# Patient Record
Sex: MALE | Race: WHITE | NOT HISPANIC OR LATINO | Employment: FULL TIME | URBAN - METROPOLITAN AREA
[De-identification: names, ages, dates, MRNs, and addresses within clinical notes are randomized per-mention and may not be internally consistent; named-entity substitution may affect disease eponyms.]

---

## 2020-01-08 ENCOUNTER — OFFICE VISIT - HEALTHEAST (OUTPATIENT)
Dept: FAMILY MEDICINE | Facility: CLINIC | Age: 40
End: 2020-01-08

## 2020-01-08 DIAGNOSIS — E66.01 MORBID OBESITY (H): ICD-10-CM

## 2020-01-08 DIAGNOSIS — I10 ESSENTIAL HYPERTENSION: ICD-10-CM

## 2020-01-08 DIAGNOSIS — E11.65 TYPE 2 DIABETES MELLITUS WITH HYPERGLYCEMIA, WITHOUT LONG-TERM CURRENT USE OF INSULIN (H): ICD-10-CM

## 2020-01-08 DIAGNOSIS — N49.2 SCROTAL ABSCESS: ICD-10-CM

## 2020-01-08 DIAGNOSIS — N49.2 CELLULITIS OF SCROTUM: ICD-10-CM

## 2020-01-08 ASSESSMENT — MIFFLIN-ST. JEOR: SCORE: 2566.68

## 2020-01-13 ENCOUNTER — OFFICE VISIT - HEALTHEAST (OUTPATIENT)
Dept: PHARMACY | Facility: CLINIC | Age: 40
End: 2020-01-13

## 2020-01-13 DIAGNOSIS — I10 ESSENTIAL HYPERTENSION: ICD-10-CM

## 2020-01-13 DIAGNOSIS — E11.65 TYPE 2 DIABETES MELLITUS WITH HYPERGLYCEMIA, WITHOUT LONG-TERM CURRENT USE OF INSULIN (H): ICD-10-CM

## 2020-01-13 DIAGNOSIS — N49.2 CELLULITIS OF SCROTUM: ICD-10-CM

## 2020-01-15 ENCOUNTER — RECORDS - HEALTHEAST (OUTPATIENT)
Dept: ADMINISTRATIVE | Facility: OTHER | Age: 40
End: 2020-01-15

## 2020-02-11 ENCOUNTER — COMMUNICATION - HEALTHEAST (OUTPATIENT)
Dept: FAMILY MEDICINE | Facility: CLINIC | Age: 40
End: 2020-02-11

## 2020-02-11 DIAGNOSIS — E11.65 TYPE 2 DIABETES MELLITUS WITH HYPERGLYCEMIA, WITHOUT LONG-TERM CURRENT USE OF INSULIN (H): ICD-10-CM

## 2020-02-18 ENCOUNTER — COMMUNICATION - HEALTHEAST (OUTPATIENT)
Dept: FAMILY MEDICINE | Facility: CLINIC | Age: 40
End: 2020-02-18

## 2020-02-18 DIAGNOSIS — E11.65 TYPE 2 DIABETES MELLITUS WITH HYPERGLYCEMIA, WITHOUT LONG-TERM CURRENT USE OF INSULIN (H): ICD-10-CM

## 2020-03-09 ENCOUNTER — OFFICE VISIT - HEALTHEAST (OUTPATIENT)
Dept: FAMILY MEDICINE | Facility: CLINIC | Age: 40
End: 2020-03-09

## 2020-03-09 DIAGNOSIS — H61.23 BILATERAL IMPACTED CERUMEN: ICD-10-CM

## 2020-03-09 DIAGNOSIS — H60.391 INFECTIVE OTITIS EXTERNA, RIGHT: ICD-10-CM

## 2020-03-16 ENCOUNTER — COMMUNICATION - HEALTHEAST (OUTPATIENT)
Dept: FAMILY MEDICINE | Facility: CLINIC | Age: 40
End: 2020-03-16

## 2020-03-16 DIAGNOSIS — E11.65 TYPE 2 DIABETES MELLITUS WITH HYPERGLYCEMIA, WITHOUT LONG-TERM CURRENT USE OF INSULIN (H): ICD-10-CM

## 2020-04-06 ENCOUNTER — AMBULATORY - HEALTHEAST (OUTPATIENT)
Dept: PHARMACY | Facility: CLINIC | Age: 40
End: 2020-04-06

## 2020-04-06 ENCOUNTER — COMMUNICATION - HEALTHEAST (OUTPATIENT)
Dept: PHARMACY | Facility: CLINIC | Age: 40
End: 2020-04-06

## 2020-04-06 DIAGNOSIS — H92.09 OTALGIA, UNSPECIFIED LATERALITY: ICD-10-CM

## 2020-04-06 DIAGNOSIS — N49.2 CELLULITIS OF SCROTUM: ICD-10-CM

## 2020-04-06 DIAGNOSIS — E11.65 TYPE 2 DIABETES MELLITUS WITH HYPERGLYCEMIA, WITHOUT LONG-TERM CURRENT USE OF INSULIN (H): ICD-10-CM

## 2020-04-10 ENCOUNTER — OFFICE VISIT - HEALTHEAST (OUTPATIENT)
Dept: FAMILY MEDICINE | Facility: CLINIC | Age: 40
End: 2020-04-10

## 2020-04-10 DIAGNOSIS — H61.23 BILATERAL IMPACTED CERUMEN: ICD-10-CM

## 2020-04-15 ENCOUNTER — COMMUNICATION - HEALTHEAST (OUTPATIENT)
Dept: SCHEDULING | Facility: CLINIC | Age: 40
End: 2020-04-15

## 2020-04-15 ENCOUNTER — OFFICE VISIT - HEALTHEAST (OUTPATIENT)
Dept: FAMILY MEDICINE | Facility: CLINIC | Age: 40
End: 2020-04-15

## 2020-04-15 DIAGNOSIS — L02.92 FURUNCLE OF SKIN OR SUBCUTANEOUS TISSUE: ICD-10-CM

## 2020-04-15 DIAGNOSIS — I10 ESSENTIAL HYPERTENSION: ICD-10-CM

## 2020-04-15 DIAGNOSIS — E66.01 MORBID OBESITY (H): ICD-10-CM

## 2020-04-15 DIAGNOSIS — E11.65 TYPE 2 DIABETES MELLITUS WITH HYPERGLYCEMIA, WITHOUT LONG-TERM CURRENT USE OF INSULIN (H): ICD-10-CM

## 2020-04-18 LAB — BACTERIA SPEC CULT: ABNORMAL

## 2020-05-04 ENCOUNTER — AMBULATORY - HEALTHEAST (OUTPATIENT)
Dept: PHARMACY | Facility: CLINIC | Age: 40
End: 2020-05-04

## 2020-05-04 DIAGNOSIS — N49.2 CELLULITIS OF SCROTUM: ICD-10-CM

## 2020-05-04 DIAGNOSIS — I10 ESSENTIAL HYPERTENSION: ICD-10-CM

## 2020-05-04 DIAGNOSIS — E11.65 TYPE 2 DIABETES MELLITUS WITH HYPERGLYCEMIA, WITHOUT LONG-TERM CURRENT USE OF INSULIN (H): ICD-10-CM

## 2020-05-04 DIAGNOSIS — H92.09 OTALGIA, UNSPECIFIED LATERALITY: ICD-10-CM

## 2020-06-25 ENCOUNTER — OFFICE VISIT - HEALTHEAST (OUTPATIENT)
Dept: FAMILY MEDICINE | Facility: CLINIC | Age: 40
End: 2020-06-25

## 2020-06-25 DIAGNOSIS — I10 ESSENTIAL HYPERTENSION: ICD-10-CM

## 2020-06-25 DIAGNOSIS — H60.391 INFECTIVE OTITIS EXTERNA, RIGHT: ICD-10-CM

## 2020-06-25 DIAGNOSIS — E11.65 TYPE 2 DIABETES MELLITUS WITH HYPERGLYCEMIA, WITHOUT LONG-TERM CURRENT USE OF INSULIN (H): ICD-10-CM

## 2020-07-02 ENCOUNTER — COMMUNICATION - HEALTHEAST (OUTPATIENT)
Dept: FAMILY MEDICINE | Facility: CLINIC | Age: 40
End: 2020-07-02

## 2020-07-02 DIAGNOSIS — E11.65 TYPE 2 DIABETES MELLITUS WITH HYPERGLYCEMIA, WITHOUT LONG-TERM CURRENT USE OF INSULIN (H): ICD-10-CM

## 2020-08-21 ENCOUNTER — COMMUNICATION - HEALTHEAST (OUTPATIENT)
Dept: FAMILY MEDICINE | Facility: CLINIC | Age: 40
End: 2020-08-21

## 2020-08-21 DIAGNOSIS — I10 ESSENTIAL HYPERTENSION: ICD-10-CM

## 2020-09-28 ENCOUNTER — COMMUNICATION - HEALTHEAST (OUTPATIENT)
Dept: FAMILY MEDICINE | Facility: CLINIC | Age: 40
End: 2020-09-28

## 2020-09-28 DIAGNOSIS — E11.65 TYPE 2 DIABETES MELLITUS WITH HYPERGLYCEMIA, WITHOUT LONG-TERM CURRENT USE OF INSULIN (H): ICD-10-CM

## 2020-10-02 ENCOUNTER — OFFICE VISIT (OUTPATIENT)
Dept: URGENT CARE | Facility: URGENT CARE | Age: 40
End: 2020-10-02
Payer: COMMERCIAL

## 2020-10-02 VITALS
WEIGHT: 315 LBS | OXYGEN SATURATION: 97 % | TEMPERATURE: 98.7 F | HEART RATE: 95 BPM | DIASTOLIC BLOOD PRESSURE: 100 MMHG | SYSTOLIC BLOOD PRESSURE: 170 MMHG

## 2020-10-02 DIAGNOSIS — L02.214 SOFT TISSUE ABSCESS OF INGUINAL REGION: Primary | ICD-10-CM

## 2020-10-02 DIAGNOSIS — I10 ESSENTIAL HYPERTENSION: ICD-10-CM

## 2020-10-02 PROCEDURE — 99203 OFFICE O/P NEW LOW 30 MIN: CPT | Performed by: FAMILY MEDICINE

## 2020-10-02 RX ORDER — CLINDAMYCIN HCL 300 MG
300 CAPSULE ORAL 3 TIMES DAILY
Qty: 30 CAPSULE | Refills: 0 | Status: SHIPPED | OUTPATIENT
Start: 2020-10-02 | End: 2020-10-12

## 2020-10-02 RX ORDER — INSULIN GLARGINE 100 [IU]/ML
INJECTION, SOLUTION SUBCUTANEOUS
COMMUNITY
Start: 2020-08-14 | End: 2021-10-01

## 2020-10-02 RX ORDER — AMLODIPINE BESYLATE 5 MG/1
TABLET ORAL
COMMUNITY
Start: 2019-10-11 | End: 2021-10-01

## 2020-10-02 RX ORDER — MUPIROCIN 20 MG/G
OINTMENT TOPICAL 2 TIMES DAILY
Qty: 22 G | Refills: 0 | Status: SHIPPED | OUTPATIENT
Start: 2020-10-02 | End: 2021-10-15

## 2020-10-02 RX ORDER — METOPROLOL TARTRATE 100 MG
TABLET ORAL
COMMUNITY
Start: 2019-10-11 | End: 2021-10-01

## 2020-10-02 RX ORDER — METFORMIN HCL 500 MG
TABLET, EXTENDED RELEASE 24 HR ORAL
COMMUNITY
Start: 2020-10-01 | End: 2021-10-01

## 2020-10-03 NOTE — PROGRESS NOTES
"  Patient presents with:  Urgent Care  Mass: Mass in Rt groin x4 days. Hx of same issue and same location which had him hospitalized due.       Subjective     Lm Leigh is a 39 year old male who presents to clinic today for the following health issues:    HPI   Genitourinary - Male  Onset/Duration: onset of pain 4 days ago, started as small bump  Increase in size, now red, now more near surface, history of bump in the same area in 12/2019  Patient evaluated diagnosed with an inguinal  Hernia,  Later there was an increase in size/pain, -area was not an inguinal hernia, infected ? Cyst in inguinal area had to be admitted to inpatient [place],area  drained it and IV antibiotic   small bump remained after completed antibiotics, now the area of previous infection-'small bump\" now increased in size, tender, inguinal area swelling again, no fever, discomfort with ambulation, when clothing rubs against the area  Description:   Dysuria (painful urination): no}  Hematuria (blood in urine): no  Frequency: no  Waking at night to urinate: no  Hesitancy (delay in urine): no  Retention (unable to empty): no  Decrease in urinary flow: no  Incontinence: no  Progression of Symptoms:  worsening  Accompanying Signs & Symptoms:  Fever: no  Back/Flank pain: no  Urethral discharge: no  Testicle lumps/masses/pain: no  Nausea and/or vomiting: no  Abdominal pain: no  History:   As noted   Precipitating or alleviating factors: None  Therapies tried and outcome: none, neosporin to areas when initially small bump increased in size       Patient Active Problem List   Diagnosis     Soft tissue abscess of inguinal region     No past surgical history on file.    Social History     Tobacco Use     Smoking status: Not on file   Substance Use Topics     Alcohol use: Not on file     No family history on file.      Current Outpatient Medications   Medication Sig Dispense Refill     amLODIPine (NORVASC) 5 MG tablet TAKE 1 TABLET BY MOUTH EVERY DAY  "      clindamycin (CLEOCIN) 300 MG capsule Take 1 capsule (300 mg) by mouth 3 times daily for 10 days 30 capsule 0     LANTUS SOLOSTAR 100 UNIT/ML soln INJECT 24 UNITS UNDER THE SKIN AT BEDTIME.       metFORMIN (GLUCOPHAGE-XR) 500 MG 24 hr tablet        metoprolol tartrate (LOPRESSOR) 100 MG tablet TAKE 1 TABLET BY MOUTH TWICE A DAY       mupirocin (BACTROBAN) 2 % external ointment Apply topically 2 times daily 22 g 0     Allergies   Allergen Reactions     Lisinopril Cough     No lab results found.   BP Readings from Last 3 Encounters:   10/02/20 (!) 170/100    Wt Readings from Last 3 Encounters:   10/02/20 (!) 176 kg (388 lb)                      Reviewed and updated as needed this visit by Provider                 Review of Systems   Constitutional, HEENT, cardiovascular, pulmonary, gi and gu systems are negative, except as otherwise noted.      Objective    BP (!) 170/100 (BP Location: Right arm, Patient Position: Chair, Cuff Size: Adult Large)   Pulse 95   Temp 98.7  F (37.1  C) (Temporal)   Wt (!) 176 kg (388 lb)   SpO2 97%   There is no height or weight on file to calculate BMI.       Physical Exam   GENERAL: healthy, alert and no distress  NECK: no adenopathy, no asymmetry, masses, or scars and thyroid normal to palpation  ABDOMEN: soft, nontender, no hepatosplenomegaly, no masses and bowel sounds normal   (male): normal male genitalia without lesions or urethral discharge, no hernia,swelling moderate tender, indurated, no fluctuance, faint erythema in soft tissue of right inguinal area,in central portion of indurated/soft tissue swelling is a small raised 1 cm in diameter area of scar tissue tray;/purple in color, site of previous abscess/I&D site, indurated, no fluctuance, no drainage or crusting tender, mild erythema   MS: no gross musculoskeletal defects noted, no edema  NEURO: Normal strength and tone, mentation intact and speech normal  BACK: no CVA tenderness, no paralumbar  tenderness      Diagnostic Test Results:  Labs reviewed in Epic  No results found for any visits on 10/02/20.        Assessment & Plan     Lm was seen today for urgent care and mass.    Diagnoses and all orders for this visit:    Soft tissue abscess of inguinal region  -     clindamycin (CLEOCIN) 300 MG capsule; Take 1 capsule (300 mg) by mouth 3 times daily for 10 days  -     mupirocin (BACTROBAN) 2 % external ointment; Apply topically 2 times daily    Essential hypertension  Comments:  no cp/sob/esparza, anxious today [facility], on metoprolol and norvasc, will contact pcp with recent elevated bp readings, home bp,may need med adjust      ASSESSMENT/PLAN:      ICD-10-CM    1. Soft tissue abscess of inguinal region  L02.214 clindamycin (CLEOCIN) 300 MG capsule     mupirocin (BACTROBAN) 2 % external ointment   2. Essential hypertension  I10     no cp/sob/esparza, anxious today [facility], on metoprolol and norvasc, will contact pcp with recent elevated bp readings, home bp,may need med adjust       Patient Instructions     If area increases in size, more painful, fever  to ER    Try warn compresses to area to help with swelling    Call urology and ask for an urgent appointment to have area checked if you do not go to ER,and even if area has improved (patient sees [facility])    Start clindamycin antibiotic 3 times a day for 10 days, ask pharmacist for a recommendation for a probiotic to take daily with antibiotic and continue daily for a total of 30 days                  Patient Education     Abscess (Antibiotic Treatment Only)  An abscess (sometimes called a  boil ) happens when bacteria get trapped under the skin and start to grow. Pus forms inside the abscess as the body responds to the bacteria. An abscess can happen with an insect bite, ingrown hair, blocked oil gland, pimple, cyst, or puncture wound.  In the early stages, your wound may be red and tender. For this stage, you  may get antibiotics. If the abscess does not get better with antibiotics, it will need to be drained with a small cut.  Home care  These tips will help you care for your abscess at home:    Soak the wound in hot water or apply hot packs (small towel soaked in hot water) to the area for 20 minutes at a time. Do this 3 to 4 times a day.    Do not cut, squeeze, or pop the boil yourself.    Apply antibiotic cream or ointment to the skin 3 to 4 times a day, unless something else was prescribed. Some ointments include an antibiotic plus a pain reliever.    If your doctor prescribed antibiotics, do not stop taking them until you have finished the medicine or the doctor tells you to stop.    You may use an over-the-counter pain medicine to control pain, unless another pain medicine was prescribed. If you have chronic liver or kidney disease or ever had a stomach ulcer or gastrointestinal bleeding, talk with your doctor before using these any of these.  Follow-up care  Follow up with your healthcare provider, or as advised. Check your wound each day for the signs of worsening infection listed below.  When to seek medical advice  Get prompt medical attention if any of these occur:    An increase in redness or swelling    Red streaks in the skin leading away from the abscess    An increase in local pain or swelling    Fever of 100.4 F (38 C) or higher, or as directed by your healthcare provider    Pus or fluid coming from the abscess    Boil returns after getting better  Date Last Reviewed: 9/1/2016 2000-2019 [employer]. [address] [place] [address], [place] [address]. All rights reserved. This information is not intended as a substitute for professional medical care. Always follow your healthcare professional's instructions.               Reviewed medication instructions and side effects. Follow up if experiences side effects.     I reviewed supportive care, otc meds to use if needed, expected course, and signs of  concern.  Follow up as directed  or if worsens in any way.  Reviewed red flag symptoms and is to go to the ER if experiences any of these.        Kristin Morales MD  [facility] [place]

## 2020-10-06 ENCOUNTER — RECORDS - HEALTHEAST (OUTPATIENT)
Dept: ADMINISTRATIVE | Facility: OTHER | Age: 40
End: 2020-10-06

## 2020-10-07 PROBLEM — L02.214 SOFT TISSUE ABSCESS OF INGUINAL REGION: Status: ACTIVE | Noted: 2020-10-07

## 2020-10-17 ENCOUNTER — COMMUNICATION - HEALTHEAST (OUTPATIENT)
Dept: FAMILY MEDICINE | Facility: CLINIC | Age: 40
End: 2020-10-17

## 2020-10-17 DIAGNOSIS — E11.65 TYPE 2 DIABETES MELLITUS WITH HYPERGLYCEMIA, WITHOUT LONG-TERM CURRENT USE OF INSULIN (H): ICD-10-CM

## 2020-11-08 ENCOUNTER — COMMUNICATION - HEALTHEAST (OUTPATIENT)
Dept: FAMILY MEDICINE | Facility: CLINIC | Age: 40
End: 2020-11-08

## 2020-11-08 DIAGNOSIS — I10 ESSENTIAL HYPERTENSION: ICD-10-CM

## 2020-12-04 ENCOUNTER — OFFICE VISIT - HEALTHEAST (OUTPATIENT)
Dept: FAMILY MEDICINE | Facility: CLINIC | Age: 40
End: 2020-12-04

## 2020-12-04 DIAGNOSIS — E11.9 TYPE 2 DIABETES MELLITUS WITHOUT COMPLICATION, WITH LONG-TERM CURRENT USE OF INSULIN (H): ICD-10-CM

## 2020-12-04 DIAGNOSIS — Z79.4 TYPE 2 DIABETES MELLITUS WITHOUT COMPLICATION, WITH LONG-TERM CURRENT USE OF INSULIN (H): ICD-10-CM

## 2020-12-04 DIAGNOSIS — L03.115 CELLULITIS OF RIGHT LOWER EXTREMITY: ICD-10-CM

## 2020-12-04 DIAGNOSIS — I10 BENIGN ESSENTIAL HYPERTENSION: ICD-10-CM

## 2020-12-09 ENCOUNTER — COMMUNICATION - HEALTHEAST (OUTPATIENT)
Dept: FAMILY MEDICINE | Facility: CLINIC | Age: 40
End: 2020-12-09

## 2020-12-09 DIAGNOSIS — E11.65 TYPE 2 DIABETES MELLITUS WITH HYPERGLYCEMIA, WITHOUT LONG-TERM CURRENT USE OF INSULIN (H): ICD-10-CM

## 2020-12-22 ENCOUNTER — AMBULATORY - HEALTHEAST (OUTPATIENT)
Dept: FAMILY MEDICINE | Facility: CLINIC | Age: 40
End: 2020-12-22

## 2020-12-22 DIAGNOSIS — E11.65 TYPE 2 DIABETES MELLITUS WITH HYPERGLYCEMIA, WITHOUT LONG-TERM CURRENT USE OF INSULIN (H): ICD-10-CM

## 2020-12-23 ENCOUNTER — OFFICE VISIT - HEALTHEAST (OUTPATIENT)
Dept: FAMILY MEDICINE | Facility: CLINIC | Age: 40
End: 2020-12-23

## 2020-12-23 DIAGNOSIS — E11.65 TYPE 2 DIABETES MELLITUS WITH HYPERGLYCEMIA, WITHOUT LONG-TERM CURRENT USE OF INSULIN (H): ICD-10-CM

## 2020-12-23 DIAGNOSIS — I10 ESSENTIAL HYPERTENSION: ICD-10-CM

## 2020-12-23 DIAGNOSIS — Z23 ENCOUNTER FOR IMMUNIZATION: ICD-10-CM

## 2020-12-23 DIAGNOSIS — Z11.59 NEED FOR HEPATITIS C SCREENING TEST: ICD-10-CM

## 2020-12-23 DIAGNOSIS — E66.01 MORBID OBESITY (H): ICD-10-CM

## 2020-12-23 LAB
ANION GAP SERPL CALCULATED.3IONS-SCNC: 15 MMOL/L (ref 5–18)
BUN SERPL-MCNC: 17 MG/DL (ref 8–22)
CALCIUM SERPL-MCNC: 9.4 MG/DL (ref 8.5–10.5)
CHLORIDE BLD-SCNC: 103 MMOL/L (ref 98–107)
CHOLEST SERPL-MCNC: 179 MG/DL
CO2 SERPL-SCNC: 18 MMOL/L (ref 22–31)
CREAT SERPL-MCNC: 1.01 MG/DL (ref 0.7–1.3)
CREAT UR-MCNC: 253.5 MG/DL
FASTING STATUS PATIENT QL REPORTED: YES
GFR SERPL CREATININE-BSD FRML MDRD: >60 ML/MIN/1.73M2
GLUCOSE BLD-MCNC: 171 MG/DL (ref 70–125)
HBA1C MFR BLD: 10.9 %
HDLC SERPL-MCNC: 51 MG/DL
LDLC SERPL CALC-MCNC: 108 MG/DL
MICROALBUMIN UR-MCNC: 72.68 MG/DL (ref 0–1.99)
MICROALBUMIN/CREAT UR: 286.7 MG/G
POTASSIUM BLD-SCNC: 4.2 MMOL/L (ref 3.5–5)
SODIUM SERPL-SCNC: 136 MMOL/L (ref 136–145)
TRIGL SERPL-MCNC: 100 MG/DL

## 2020-12-24 LAB — HCV AB SERPL QL IA: NEGATIVE

## 2020-12-28 ENCOUNTER — COMMUNICATION - HEALTHEAST (OUTPATIENT)
Dept: FAMILY MEDICINE | Facility: CLINIC | Age: 40
End: 2020-12-28

## 2021-01-19 ENCOUNTER — AMBULATORY - HEALTHEAST (OUTPATIENT)
Dept: EDUCATION SERVICES | Facility: CLINIC | Age: 41
End: 2021-01-19

## 2021-01-19 DIAGNOSIS — E11.65 TYPE 2 DIABETES MELLITUS WITH HYPERGLYCEMIA, WITHOUT LONG-TERM CURRENT USE OF INSULIN (H): ICD-10-CM

## 2021-01-21 ENCOUNTER — AMBULATORY - HEALTHEAST (OUTPATIENT)
Dept: LAB | Facility: CLINIC | Age: 41
End: 2021-01-21

## 2021-01-21 ENCOUNTER — OFFICE VISIT - HEALTHEAST (OUTPATIENT)
Dept: FAMILY MEDICINE | Facility: CLINIC | Age: 41
End: 2021-01-21

## 2021-01-21 ENCOUNTER — COMMUNICATION - HEALTHEAST (OUTPATIENT)
Dept: FAMILY MEDICINE | Facility: CLINIC | Age: 41
End: 2021-01-21

## 2021-01-21 ENCOUNTER — COMMUNICATION - HEALTHEAST (OUTPATIENT)
Dept: SCHEDULING | Facility: CLINIC | Age: 41
End: 2021-01-21

## 2021-01-21 DIAGNOSIS — E11.65 TYPE 2 DIABETES MELLITUS WITH HYPERGLYCEMIA, WITHOUT LONG-TERM CURRENT USE OF INSULIN (H): ICD-10-CM

## 2021-01-21 DIAGNOSIS — I10 ESSENTIAL HYPERTENSION: ICD-10-CM

## 2021-01-21 DIAGNOSIS — E66.01 MORBID OBESITY (H): ICD-10-CM

## 2021-01-21 DIAGNOSIS — R07.9 ACUTE CHEST PAIN: ICD-10-CM

## 2021-01-21 LAB
ATRIAL RATE - MUSE: 95 BPM
DIASTOLIC BLOOD PRESSURE - MUSE: NORMAL
FASTING STATUS PATIENT QL REPORTED: YES
GLUCOSE BLD-MCNC: 187 MG/DL (ref 70–125)
INTERPRETATION ECG - MUSE: NORMAL
P AXIS - MUSE: 52 DEGREES
PR INTERVAL - MUSE: 140 MS
QRS DURATION - MUSE: 100 MS
QT - MUSE: 362 MS
QTC - MUSE: 454 MS
R AXIS - MUSE: 72 DEGREES
SYSTOLIC BLOOD PRESSURE - MUSE: NORMAL
T AXIS - MUSE: 5 DEGREES
VENTRICULAR RATE- MUSE: 95 BPM

## 2021-01-22 LAB — C PEPTIDE SERPL-MCNC: 5.6 NG/ML (ref 0.9–6.9)

## 2021-01-24 LAB — GAD65 AB SER IA-ACNC: <5 IU/ML (ref 0–5)

## 2021-01-27 ENCOUNTER — COMMUNICATION - HEALTHEAST (OUTPATIENT)
Dept: FAMILY MEDICINE | Facility: CLINIC | Age: 41
End: 2021-01-27

## 2021-02-02 ENCOUNTER — HOSPITAL ENCOUNTER (OUTPATIENT)
Dept: CARDIOLOGY | Facility: HOSPITAL | Age: 41
Discharge: HOME | End: 2021-02-02
Attending: FAMILY MEDICINE

## 2021-02-02 ENCOUNTER — COMMUNICATION - HEALTHEAST (OUTPATIENT)
Dept: FAMILY MEDICINE | Facility: CLINIC | Age: 41
End: 2021-02-02

## 2021-02-02 DIAGNOSIS — R07.9 ACUTE CHEST PAIN: ICD-10-CM

## 2021-02-02 LAB
CV STRESS CURRENT BP HE: NORMAL
CV STRESS CURRENT HR HE: 109
CV STRESS CURRENT HR HE: 112
CV STRESS CURRENT HR HE: 113
CV STRESS CURRENT HR HE: 114
CV STRESS CURRENT HR HE: 117
CV STRESS CURRENT HR HE: 120
CV STRESS CURRENT HR HE: 121
CV STRESS CURRENT HR HE: 121
CV STRESS CURRENT HR HE: 126
CV STRESS CURRENT HR HE: 128
CV STRESS CURRENT HR HE: 129
CV STRESS CURRENT HR HE: 136
CV STRESS CURRENT HR HE: 140
CV STRESS CURRENT HR HE: 145
CV STRESS CURRENT HR HE: 146
CV STRESS CURRENT HR HE: 149
CV STRESS CURRENT HR HE: 151
CV STRESS CURRENT HR HE: 158
CV STRESS CURRENT HR HE: 161
CV STRESS CURRENT HR HE: 161
CV STRESS CURRENT HR HE: 165
CV STRESS CURRENT HR HE: 165
CV STRESS DEVIATION TIME HE: NORMAL
CV STRESS ECHO PERCENT HR HE: NORMAL
CV STRESS EXERCISE STAGE HE: NORMAL
CV STRESS EXERCISE STAGE REACHED HE: NORMAL
CV STRESS FINAL RESTING BP HE: NORMAL
CV STRESS FINAL RESTING HR HE: 112
CV STRESS MAX HR HE: 166
CV STRESS MAX TREADMILL GRADE HE: 14
CV STRESS MAX TREADMILL SPEED HE: 3.4
CV STRESS PEAK DIA BP HE: NORMAL
CV STRESS PEAK SYS BP HE: NORMAL
CV STRESS PHASE HE: NORMAL
CV STRESS PROTOCOL HE: NORMAL
CV STRESS RESTING PT POSITION HE: NORMAL
CV STRESS RESTING PT POSITION HE: NORMAL
CV STRESS ST DEVIATION AMOUNT HE: NORMAL
CV STRESS ST DEVIATION ELEVATION HE: NORMAL
CV STRESS ST EVELATION AMOUNT HE: NORMAL
CV STRESS TEST TYPE HE: NORMAL
CV STRESS TOTAL STAGE TIME MIN 1 HE: NORMAL
RATE PRESSURE PRODUCT: NORMAL
STRESS ECHO BASELINE DIASTOLIC HE: 88
STRESS ECHO BASELINE HR: 109
STRESS ECHO BASELINE SYSTOLIC BP: 194
STRESS ECHO CALCULATED PERCENT HR: 91 %
STRESS ECHO LAST STRESS DIASTOLIC BP: 94
STRESS ECHO LAST STRESS HR: 165
STRESS ECHO LAST STRESS SYSTOLIC BP: 208
STRESS ECHO POST ESTIMATED WORKLOAD: 7.9
STRESS ECHO POST EXERCISE DUR MIN: 6
STRESS ECHO POST EXERCISE DUR SEC: 38
STRESS ECHO TARGET HR: 165.62

## 2021-02-04 ENCOUNTER — AMBULATORY - HEALTHEAST (OUTPATIENT)
Dept: EDUCATION SERVICES | Facility: CLINIC | Age: 41
End: 2021-02-04

## 2021-02-04 DIAGNOSIS — E11.65 TYPE 2 DIABETES MELLITUS WITH HYPERGLYCEMIA, WITHOUT LONG-TERM CURRENT USE OF INSULIN (H): ICD-10-CM

## 2021-02-26 ENCOUNTER — OFFICE VISIT - HEALTHEAST (OUTPATIENT)
Dept: FAMILY MEDICINE | Facility: CLINIC | Age: 41
End: 2021-02-26

## 2021-02-26 DIAGNOSIS — E11.65 TYPE 2 DIABETES MELLITUS WITH HYPERGLYCEMIA, WITHOUT LONG-TERM CURRENT USE OF INSULIN (H): ICD-10-CM

## 2021-02-26 DIAGNOSIS — R07.9 ACUTE CHEST PAIN: ICD-10-CM

## 2021-02-26 DIAGNOSIS — R80.9 MICROALBUMINURIA: ICD-10-CM

## 2021-02-26 DIAGNOSIS — E66.813 CLASS 3 SEVERE OBESITY DUE TO EXCESS CALORIES WITH SERIOUS COMORBIDITY AND BODY MASS INDEX (BMI) OF 50.0 TO 59.9 IN ADULT (H): ICD-10-CM

## 2021-02-26 DIAGNOSIS — I10 ESSENTIAL HYPERTENSION: ICD-10-CM

## 2021-02-26 DIAGNOSIS — E66.01 CLASS 3 SEVERE OBESITY DUE TO EXCESS CALORIES WITH SERIOUS COMORBIDITY AND BODY MASS INDEX (BMI) OF 50.0 TO 59.9 IN ADULT (H): ICD-10-CM

## 2021-03-23 ENCOUNTER — AMBULATORY - HEALTHEAST (OUTPATIENT)
Dept: FAMILY MEDICINE | Facility: CLINIC | Age: 41
End: 2021-03-23

## 2021-03-23 ENCOUNTER — OFFICE VISIT - HEALTHEAST (OUTPATIENT)
Dept: FAMILY MEDICINE | Facility: CLINIC | Age: 41
End: 2021-03-23

## 2021-03-23 DIAGNOSIS — I10 ESSENTIAL HYPERTENSION: ICD-10-CM

## 2021-03-23 DIAGNOSIS — R07.9 ACUTE CHEST PAIN: ICD-10-CM

## 2021-03-23 DIAGNOSIS — E11.65 TYPE 2 DIABETES MELLITUS WITH HYPERGLYCEMIA, WITHOUT LONG-TERM CURRENT USE OF INSULIN (H): ICD-10-CM

## 2021-03-23 DIAGNOSIS — E66.01 CLASS 3 SEVERE OBESITY DUE TO EXCESS CALORIES WITH SERIOUS COMORBIDITY AND BODY MASS INDEX (BMI) OF 50.0 TO 59.9 IN ADULT (H): ICD-10-CM

## 2021-03-23 DIAGNOSIS — E66.813 CLASS 3 SEVERE OBESITY DUE TO EXCESS CALORIES WITH SERIOUS COMORBIDITY AND BODY MASS INDEX (BMI) OF 50.0 TO 59.9 IN ADULT (H): ICD-10-CM

## 2021-03-23 DIAGNOSIS — R80.9 MICROALBUMINURIA: ICD-10-CM

## 2021-03-23 LAB
ANION GAP SERPL CALCULATED.3IONS-SCNC: 11 MMOL/L (ref 5–18)
BUN SERPL-MCNC: 19 MG/DL (ref 8–22)
CALCIUM SERPL-MCNC: 8.8 MG/DL (ref 8.5–10.5)
CHLORIDE BLD-SCNC: 102 MMOL/L (ref 98–107)
CO2 SERPL-SCNC: 22 MMOL/L (ref 22–31)
CREAT SERPL-MCNC: 0.81 MG/DL (ref 0.7–1.3)
GFR SERPL CREATININE-BSD FRML MDRD: >60 ML/MIN/1.73M2
GLUCOSE BLD-MCNC: 160 MG/DL (ref 70–125)
HBA1C MFR BLD: 8.4 %
POTASSIUM BLD-SCNC: 4 MMOL/L (ref 3.5–5)
SODIUM SERPL-SCNC: 135 MMOL/L (ref 136–145)

## 2021-03-26 ENCOUNTER — AMBULATORY - HEALTHEAST (OUTPATIENT)
Dept: NURSING | Facility: CLINIC | Age: 41
End: 2021-03-26

## 2021-04-16 ENCOUNTER — AMBULATORY - HEALTHEAST (OUTPATIENT)
Dept: NURSING | Facility: CLINIC | Age: 41
End: 2021-04-16

## 2021-05-06 ENCOUNTER — COMMUNICATION - HEALTHEAST (OUTPATIENT)
Dept: FAMILY MEDICINE | Facility: CLINIC | Age: 41
End: 2021-05-06

## 2021-05-06 DIAGNOSIS — E11.65 TYPE 2 DIABETES MELLITUS WITH HYPERGLYCEMIA, WITHOUT LONG-TERM CURRENT USE OF INSULIN (H): ICD-10-CM

## 2021-05-24 ENCOUNTER — COMMUNICATION - HEALTHEAST (OUTPATIENT)
Dept: SCHEDULING | Facility: CLINIC | Age: 41
End: 2021-05-24

## 2021-05-24 DIAGNOSIS — E11.65 TYPE 2 DIABETES MELLITUS WITH HYPERGLYCEMIA, WITHOUT LONG-TERM CURRENT USE OF INSULIN (H): ICD-10-CM

## 2021-05-30 ENCOUNTER — RECORDS - HEALTHEAST (OUTPATIENT)
Dept: ADMINISTRATIVE | Facility: CLINIC | Age: 41
End: 2021-05-30

## 2021-06-04 VITALS
BODY MASS INDEX: 41.75 KG/M2 | HEART RATE: 88 BPM | DIASTOLIC BLOOD PRESSURE: 80 MMHG | SYSTOLIC BLOOD PRESSURE: 132 MMHG | WEIGHT: 315 LBS | HEIGHT: 73 IN

## 2021-06-04 VITALS
OXYGEN SATURATION: 97 % | HEART RATE: 95 BPM | WEIGHT: 315 LBS | BODY MASS INDEX: 50.03 KG/M2 | RESPIRATION RATE: 16 BRPM | DIASTOLIC BLOOD PRESSURE: 90 MMHG | TEMPERATURE: 98.1 F | SYSTOLIC BLOOD PRESSURE: 144 MMHG

## 2021-06-04 VITALS
DIASTOLIC BLOOD PRESSURE: 85 MMHG | TEMPERATURE: 97.9 F | OXYGEN SATURATION: 99 % | WEIGHT: 315 LBS | RESPIRATION RATE: 17 BRPM | HEART RATE: 80 BPM | SYSTOLIC BLOOD PRESSURE: 151 MMHG | BODY MASS INDEX: 48.53 KG/M2

## 2021-06-04 VITALS
OXYGEN SATURATION: 95 % | WEIGHT: 315 LBS | BODY MASS INDEX: 50.03 KG/M2 | SYSTOLIC BLOOD PRESSURE: 190 MMHG | DIASTOLIC BLOOD PRESSURE: 86 MMHG | HEART RATE: 92 BPM

## 2021-06-05 VITALS
OXYGEN SATURATION: 98 % | HEART RATE: 99 BPM | SYSTOLIC BLOOD PRESSURE: 150 MMHG | WEIGHT: 315 LBS | DIASTOLIC BLOOD PRESSURE: 80 MMHG | TEMPERATURE: 98.5 F | BODY MASS INDEX: 51.63 KG/M2

## 2021-06-05 VITALS
SYSTOLIC BLOOD PRESSURE: 148 MMHG | BODY MASS INDEX: 50.96 KG/M2 | TEMPERATURE: 97.3 F | DIASTOLIC BLOOD PRESSURE: 88 MMHG | WEIGHT: 315 LBS | HEART RATE: 88 BPM | OXYGEN SATURATION: 96 %

## 2021-06-05 VITALS
SYSTOLIC BLOOD PRESSURE: 146 MMHG | WEIGHT: 315 LBS | HEART RATE: 85 BPM | OXYGEN SATURATION: 97 % | TEMPERATURE: 98.7 F | DIASTOLIC BLOOD PRESSURE: 86 MMHG | BODY MASS INDEX: 50.96 KG/M2

## 2021-06-05 VITALS
HEART RATE: 85 BPM | BODY MASS INDEX: 51.23 KG/M2 | TEMPERATURE: 97.5 F | DIASTOLIC BLOOD PRESSURE: 86 MMHG | WEIGHT: 315 LBS | SYSTOLIC BLOOD PRESSURE: 152 MMHG | OXYGEN SATURATION: 85 %

## 2021-06-05 VITALS
SYSTOLIC BLOOD PRESSURE: 173 MMHG | RESPIRATION RATE: 20 BRPM | TEMPERATURE: 98.1 F | DIASTOLIC BLOOD PRESSURE: 96 MMHG | OXYGEN SATURATION: 97 % | HEART RATE: 100 BPM

## 2021-06-05 VITALS — WEIGHT: 315 LBS | BODY MASS INDEX: 49.89 KG/M2

## 2021-06-05 VITALS — WEIGHT: 315 LBS | BODY MASS INDEX: 51.1 KG/M2

## 2021-06-05 NOTE — PROGRESS NOTES
MTM Initial Encounter  Assessment & Plan                                                     Post Discharge Medication Reconciliation Status: discharge medications reconciled and changed, per note/orders (see AVS)     Type 2 Diabetes:  borderline controlled. A1C was not at goal of <7%, but has not been checked since starting insulin. FBG not at goal  mg/dL. Reviewed algorithm for diabetes medications and that I would recommend starting metformin. Since he may respond very well to metformin, will d/c Humalog when he starts metformin to avoid hypoglycemia. Reviewed hypoglycemia and diarrhea/loose stools. He will use more of his Humalog first before starting insulin since he paid ~$200 for his insulin. Since fasting BG are a bit high, will increase Lantus to 24 units but I hesitate increasing too much further in case he responds well to metformin. In the future can consider a GLP1 agonist.   Will check lipids in the future before considering statin, but he is not quite 42 years old. Too young for aspirin.   PLAN:   1. Increase Lantus to 24 units.   2. Start metformin  mg daily for 1 week, then increase to 1000 mg daily. Stop Humalog when starting metformin     Cellulitis of scrotum: Per patient, appears to have improved but planning on seeing urology on Wednesday. Encouraged him to return oxycodone to a drop box since he is not using.     Hypertension: BP well controlled. Will continue current medication regimen. If patient has signs of proteinuria when BG well controlled, will consider replacing amlodipine with losartan for renal protection.     Follow Up  1 month    Subjective & Objective                                                     Lm Leigh is a 39 y.o. male coming in for an initial visit for Medication Therapy Management. He was referred to me from Deep Goel MD for diabetes.     Chief Complaint: new diabetes diagnosis and wants to make sure he is doing everything right.      Medication Adherence/Access: Takes medications twice daily. No adherence issues noted.     Type 2 Diabetes: New diagnosis. Started on Lantus 22 units and Humalog 4 units three times a day + sliding scale inpatient and he continues this dose. Eats three meals a day now. Has AccuChek luciano on his phone to record.  Has three full Humalog pens left.   Tests BG 3-4 times daily before meals and HS. Blood sugars per log:   Fasting AM = 173, 182, 191,186,194, 176, 249, 294.  Before lunch   Last A1c checked 12/27/19 = 14.7%.   Hypoglycemia none  Hyperglycemia symptoms: was always drinking water but thinks he is drinking less now.   Using a AccuChek meter.   Microalbumin checked n/a  Is not taking a statin. Lipids checked in 2012.   Is not taking aspirin 81 mg for primary prevention.   Working to get the weight down. Not gone to the gym yet due to scrotum issue. Cut out soda and sugar.   Wt Readings from Last 3 Encounters:   01/08/20 (!) 352 lb 14.4 oz (160.1 kg)   12/29/19 (!) 358 lb 11.2 oz (162.7 kg)   12/23/16 (!) 350 lb (158.8 kg)     Cellulitis of scrotum: Patient was in [facility] for 3 days 12/26-12/29 for right groin swelling found to have scrotal abscess which was I&Ded at bedside on 12/27. ID had him on linezolid and Zosyn and discharged on 10 days Augmentin -- completed. Follow up with urology on Wednesday. Discharged home on APAP and oxycodone -- took the APAP PRN, but has not taken the oxycodone because he does not need it. Healing up fine.     Hypertension: Currently taking amlodipine 5 mg daily and metoprolol tartrate 100 mg two times a day. Patient does not monitor BP at home. Cough with lisinopril.   BP Readings from Last 3 Encounters:   01/08/20 132/80   12/29/19 137/79   12/23/16 165/77           PMH: reviewed in EPIC   Allergies/ADRs: reviewed in EPIC   Alcohol: reviewed in EPIC   Tobacco:   Social History     Tobacco Use   Smoking Status Never Smoker   Smokeless Tobacco Never Used     Today's Vitals:  "There were no vitals filed for this visit.  ----------------    The patient was given a summary of these recommendations as an after visit summary    I spent 30 minutes with this patient today.   All changes were made via collaborative practice agreement with Deep Goel MD. A copy of the visit note was provided to the patient's provider.     Milena AngelesD., BCACP  Medication Therapy Management Pharmacist  [facility]     Current Outpatient Medications   Medication Sig Dispense Refill     acetaminophen (TYLENOL) 500 MG tablet Take 2 tablets (1,000 mg total) by mouth every 6 (six) hours as needed.  0     amLODIPine (NORVASC) 5 MG tablet Take 5 mg by mouth daily.       blood glucose test strips Use 1 each As Directed as needed. Strips compatible with Accuchek guide 100 strip 11     generic lancets Use 1 each As Directed as needed. Dispense brand per patient's insurance at pharmacy discretion. 200 each 11     ibuprofen (ADVIL,MOTRIN) 400 MG tablet Take 400 mg by mouth every 6 (six) hours as needed for pain.       LANTUS SOLOSTAR U-100 INSULIN 100 unit/mL (3 mL) pen Inject 22 Units under the skin at bedtime. 5 adj dose pen PRN     metoprolol tartrate (LOPRESSOR) 100 MG tablet Take 100 mg by mouth 2 (two) times a day.       NOVOLOG FLEXPEN U-100 INSULIN 100 unit/mL (3 mL) injection pen 4unit SQ w/meals + slidingscale: 141-180: 2unit; 181-220: 4unit; 221-260: 6unit; 261-300: 8unit; 301-340: 10unit; 341-400: 12unit; >400:14u 5 Pre-filled Pen Syringe PRN     oxyCODONE (ROXICODONE) 5 MG immediate release tablet Take 1 tablet (5 mg total) by mouth every 6 (six) hours as needed for pain. 6 tablet 0     pen needle, diabetic 32 gauge x 5/32\" Ndle Use 1 each As Directed 4 (four) times a day as needed. 200 each 11     No current facility-administered medications for this visit.                "

## 2021-06-05 NOTE — PROGRESS NOTES
Hospital Follow-up Visit:    Assessment/Plan:     1. Type 2 diabetes mellitus with hyperglycemia, without long-term current use of insulin (H)  Patient will continue with 22 units of Lantus at night and 4 units of Humalog prandially 3 times daily depending on blood sugars patient has finished his Augmentin  - Ambulatory referral to Medication Management    2. Cellulitis of scrotum  Patient has finished his Augmentin; I will make an appointment with urology Dr. Guajardo for follow-up    3. Essential hypertension  Patient is to continue with amlodipine 5 mg daily    4. Morbid obesity (H)  We will enroll the patient in the weight loss program once his diabetes mellitus education and stability have been established    5. Scrotal abscess  Scrotal abscess was examined and is the size of presenting complaint but patient needs to be followed by urological surgery  - Ambulatory referral to Urology        Subjective:     Lm Leigh is a 39 y.o. male who presents for a hospital discharge follow up.  The patient was seen at the emergency room complaining of pain in his right groin.  He was seen and evaluated told he had an inguinal hernia and the referral was placed for general surgery to treat and evaluate.  However within the next 48 hours the patient noticed increased swelling tenderness fever discomfort in the right lower groin area.  Patient was re-seen and reevaluated appropriate imaging was done laboratory done patient had a deep cellulitis of the right groin.  He was admitted to the hospital placed on intravenous antibiotics and spent 2 overnights in the hospital.  He was evaluated by urology he was found to have a upper scrotal lower inguinal abscess this was I&D and with extravasation of puslike material.  The material was cultured.  Patient was discharged on amoxicillin clavulanic acid which was appropriate for the sensitivity of the apparent staph infection.  Patient was incidentally found to be a undiagnosed  diabetic with blood sugar greater than 300.  A1c of 14.7.  Patient was treated with insulin ultimately stabilized on 22 units of Lantus 4 units of prandial insulin instructed via diabetes education but told to present to primary care for further treatment evaluation reeducation in a primary care setting.  Patient wishes to change primary care providers based on recent history and illnesses.  Patient was seen today diabetes management reviewed.  Education medication management referral placed.  Patient has healing firm large abscess of right lower groin area.  Perusal of the medical record revealed that it was Dr. Guajardo of [facility], operated by [facility] urology who I&D of the abscess.  Patient was seen by infectious disease.  I feel that patient needs to be followed surgically to complete healing with this abscess and a referral was placed for urology.  I am satisfied with his diabetes management at this time.  His blood pressure management is adequate and will keep him on amlodipine 5 mg he will finish the amoxicillin clavulanic acid.  He does have morbid obesity will need weight management down the road.  That referral will be placed once he is surgical status has stabilized and his diabetes education and diabetes management is satisfactory to the new primary care setting.45 minute new patient and post hospitalization examination and review of medications and placement of appropriate referrals      Hospital/Nursing Home/IP Rehab Facility: [facility]  Date of Admission: 12/26/2019  Date of Discharge:12/29/2019  Reason(s) for Admission: Lump and swelling             Do you have any problems taking your medication regularly?  None       Have you had any changes in your medication since discharge? Yes, insulin       Have you had any difficulty following your discharge or treatment plan?  No    Summary of hospitalization:  Hospital discharge summary reviewed  Diagnostic Tests/Treatments reviewed.  Follow up needed: None  Other  "Healthcare Providers Involved in Patient's Care: Patient Care Team:  Mick Bonilla MD as PCP - General (Family Medicine)      Update since discharge: {improved   Information from family, SNF, care coordination:   Post Discharge Medication Reconciliation: discharge medications reconciled, continue medications without change  Plan of care communicated with: patient    Objective:     Vitals:    01/08/20 0808   BP: 132/80   Pulse: 88   Weight: (!) 352 lb 14.4 oz (160.1 kg)   Height: 6' 0.5\" (1.842 m)         Physical Exam:  General Appearance:  Alert, cooperative, no distress; overweight morbid body habitus  Head:  Normocephalic, no obvious abnormality  Ears: TM anatomy normal  Eyes:  PERRL, EOM's intact, conjunctiva and corneas clear  Nose:  Nares symmetrical, septum midline, mucosa pink, no sinus tenderness  Throat:  Lips, tongue, and mucosa are moist, pink, and intact  Neck:  Supple, symmetrical, trachea midline, no adenopathy; thyroid: no enlargement, symmetric,no tenderness/mass/nodules; no carotid bruit, no JVD  Back:  Symmetrical, no curvature, ROM normal, no CVA tenderness  Chest/Breast:  No mass or tenderness  Lungs:  Clear to auscultation bilaterally, respirations unlabored   Heart:  Normal PMI, regular rate & rhythm, S1 and S2 normal, no murmurs, rubs, or gallops  Abdomen:  Soft, non-tender, bowel sounds active all four quadrants, no mass, or organomegaly  Musculoskeletal:  Tone and strength strong and symmetrical, all extremities  Lymphatic:  No adenopathy  Skin/Hair/Nails:  Skin warm, dry, and intact, no rashes  Neurologic:  Alert and oriented x3, no cranial nerve deficits, normal strength and tone, gait steady  Extremities:  No edema.  Suhas's sign negative.    Genitourinary: In the lower right inguinal area in the right upper scrotum there is a mass measuring 3-1/2 x 5-1/2 cm which is mobile and nontender at this time appearance is that of a healing fibrous-like abscess I do not palpate any inguinal " adenopathy.  Follow-up for inguinal hernia will be indicated  Pulses:  Equal bilaterally      Coding guidelines for this visit:  Type of Medical   Decision Making Face-to-Face Visit       within 7 Days of discharge Face-to-Face Visit        within 14 days of discharge   Moderate Complexity [id number]   High Complexity [id number]       Electronically signed by Deep Goel MD 01/08/20 8:11 AM

## 2021-06-05 NOTE — PATIENT INSTRUCTIONS - HE
Recommendations from today's PharmD medication management visit                                                       1. When are you ready to be done with Humalog, start metformin  mg once daily. After one week increase to 1000 mg (two tablets).     2. Increase Lantus to 24 units    Next pharmacist visit: about 1 month on MyChart or phone    It was great to speak with you today.  I value your experience and would be very thankful for your time with providing feedback on our clinic survey. You may receive a survey via email or text message in the next few days.     My Pharmacist's contact information:                                                       It was a pleasure seeing you today to discuss your medications!  Please feel free to contact me with any questions or concerns you have. I look forward to seeing you again to help you get the most benefit from your medications!    To reschedule your PharmD appointment, please call the clinic directly or you may call the [facility] scheduling line at [phone] or toll-free at [phone]:   [facility] (available for appointments Mondays and Thursdays)  [facility] (available for appointments Tuesday, Wednesday & Friday)     Rajni Hansen, Gary, BCACP  Medication Therapy Management Pharmacist  [facility]

## 2021-06-06 NOTE — TELEPHONE ENCOUNTER
Medication Request  Medication name: Metformin 500 mg 24 hr tablet. Takes 2 tablets daily per patient.  Requested Pharmacy: [facility]  Reason for request: is out of medication      When did you use medication last?:  Today      Okay to leave a detailed message: yes

## 2021-06-06 NOTE — TELEPHONE ENCOUNTER
"Who is calling:  Patient  Reason for Call:  Patient was checking the status of his request. Patient was informed it was approved but was not sent as an Rx. See Lantus Rx, it was approved as a \"no print\". Please have a covering re-order this correctly as a normal Rx.    Please call the patient when this Rx has been fixed and approved.  Date of last appointment with primary care: 1/8/20  Okay to leave a detailed message: No  [phone]    "

## 2021-06-06 NOTE — PROGRESS NOTES
Ear Cerumen Removal    Date/Time: 3/9/2020 7:26 PM  Performed by: Juliet Gómez CNP  Authorized by: Juliet Gómez, CNP     Anesthesia:  Local Anesthetic: none  Location details: right ear and left ear  Patient tolerance: Patient tolerated the procedure well with no immediate complications  Procedure type: irrigation   Sedation:  Patient sedated: no

## 2021-06-06 NOTE — TELEPHONE ENCOUNTER
RN cannot approve Refill Request    RN can NOT refill this medication Protocol failed and NO refill given.       Freya Fletcher, [facility] Triage/Med Refill 2/20/2020    Requested Prescriptions   Pending Prescriptions Disp Refills     LANTUS SOLOSTAR U-100 INSULIN 100 unit/mL (3 mL) pen 15 adj dose pen 11     Sig: Inject 24 Units under the skin at bedtime.       Insulin/GLP-1 Refill Protocol Failed - 2/18/2020 12:44 PM        Failed - Microalbumin in last year     No results found for: MICROALBUR               Passed - Visit with PCP or prescribing provider visit in last 6 months     Last office visit with prescriber/PCP: 1/8/2020 OR same dept: 1/8/2020 Deep Goel MD OR same specialty: 1/8/2020 Deep Goel MD Last physical: Visit date not found Last MTM visit: Visit date not found     Next appt within 3 mo: Visit date not found  Next physical within 3 mo: Visit date not found  Prescriber OR PCP: Deep Goel MD  Last diagnosis associated with med order: 1. Type 2 diabetes mellitus with hyperglycemia, without long-term current use of insulin (H)  - LANTUS SOLOSTAR U-100 INSULIN 100 unit/mL (3 mL) pen; Inject 24 Units under the skin at bedtime.  Dispense: 15 adj dose pen; Refill: 11    If protocol passes may refill for 6 months if within 3 months of last provider visit (or a total of 9 months).              Passed - A1C in last 6 months     Hemoglobin A1c   Date Value Ref Range Status   12/27/2019 14.7 (H) 4.2 - 6.1 % Final               Passed - Blood pressure in last year     BP Readings from Last 1 Encounters:   01/08/20 132/80             Passed - Creatinine done in last year     Creatinine   Date Value Ref Range Status   12/28/2019 0.75 0.70 - 1.30 mg/dL Final

## 2021-06-06 NOTE — TELEPHONE ENCOUNTER
Who is calling:  Patient   Reason for Call:  Caller is checking on the status of refill. Caller stated that he is going to be out soon and is wondering what the processes are. Caller stated that hopefully it can get filled soon.   Date of last appointment with primary care:   Okay to leave a detailed message: Yes  [phone]

## 2021-06-06 NOTE — TELEPHONE ENCOUNTER
Who is calling:  Patient  Reason for Call:  Patient has questions regarding metformin dosage and would like to speak with Rajni Stuart in Med management  Date of last appointment with primary care: 1/8/20  Okay to leave a detailed message: Yes

## 2021-06-06 NOTE — TELEPHONE ENCOUNTER
Medication Question or Clarification  Who is calling: patient  What medication are you calling about (include dose and sig)?:     blood glucose test strips  100 strip  11  12/29/2019      Sig - Route: Use 1 each As Directed as needed. Strips compatible with Accuchek guide - Miscellaneous       Who prescribed the medication?: Libertad Walker MD   What is your question/concern?: Patient asking if he is to be testing 1 time daily or 3 times daily. Patient reports he needs a new prescription. Please advise!  Requested Pharmacy: [facility] [address]  Okay to leave a detailed message?: Yes

## 2021-06-06 NOTE — TELEPHONE ENCOUNTER
Called Lm. Calld because he is running low on metformin and would like a refill. Taking  mg x2 in the morning. No loose stools. BG have been 140-150 most of the time. No lows.     Will refill for 90 day supply and set up follow up for beginning of April for repeat labs.     Rajni Hansen, Pharm.D., BCACP  Medication Therapy Management Pharmacist  [facility]

## 2021-06-07 NOTE — TELEPHONE ENCOUNTER
Patient Returning Call  Reason for call:  Patient returning miss call from clinic  Information relayed to patient:  Writer relayed encounter message as written and assisted patient in scheduling telephone appointment for 04/10/20  Patient has additional questions:  No   If YES, what are your questions/concerns:    Okay to leave a detailed message?: No call back needed

## 2021-06-07 NOTE — TELEPHONE ENCOUNTER
Rajni how often do you think this patient should check his blood sugar?   Looks like you have seen him previously and discussed diabetes.    see mdm for attending note

## 2021-06-07 NOTE — TELEPHONE ENCOUNTER
Reason contacted:  Appointment   Information relayed:  Helped arrange an in clinic office visit today as it appears patients telephone visit was arranged for tomorrow rather than today. Patient is going to speak with his employer to ensure he will be able to leave work for this appointment and call me back.   Additional questions:  No  Further follow-up needed:  No  Okay to leave a detailed message:  No

## 2021-06-07 NOTE — TELEPHONE ENCOUNTER
Spoke to patient this morning for diabetes follow up.   He was seen at walk in [facility] on 3/9/20 for ear pain and had cerumen removal and prescribed acetic acid-hydrocortisone solution. Continues to have the ear pain. Does not think he needs more wax removal. [facility] was a 3 hour wait and he would like to have follow up by PCP or covering provider -- he is ok with phone appointment if able.     Works from 9am-5pm, so would prefer early appointment if possible. Please reach out to patient to schedule. Thanks!     Rajni Hansen, Pharm.D., BCACP  Medication Therapy Management Pharmacist  [facility]

## 2021-06-07 NOTE — PROGRESS NOTES
MT Follow Up Encounter  Assessment & Plan                                                       Type 2 Diabetes:  improved. A1C was not goal of <7% and has not been rechecked since diagnosis. Will not be able to check until COVID-19 resolution, but based on BG his A1c will be improved. FBG not at goal  mg/dL, but two hours post prandial at goal <180 mg/dL. Rather than increase insulin, will start with maximizing metformin. Will have him take 1500 mg for 1 week then 2000 mg daily to lessen GI risk. Will follow up in 1 month and if fasting AM BG still >150 mg/dL, then will increase Lantus. Could consider GLP1 agonist in the future.   Provided him with link for Lantus coupon.   Reviewed to monitor for hypoglycemia.   Will check microalbuminuria and lipids in the future with A1c.   Too young for aspirin. Will defer statin until lipids checked.   PLAN:   1. Incerase metformin to 1500 mg/day for 1 week, then 2000 mg/day.   2. Future A1c, lipids, and microalbuminuria.     Cellulitis of scrotum: Resolved.      Ear Pain: Patient continues to be in pain. Will send message to PCP to see if they can set up a phone appointment.     Follow Up  1 month     Subjective & Objective                                                       Lm Leigh is a 39 y.o. male called for a follow up visit for Medication Therapy Management. He was referred to me from Deep Goel MD.     Patient consented to a telehealth visit: Yes    Chief Complaint: Diabetes follow up since 1/13/20 appointment    Medication Adherence/Access: No issues noted.     Type 2 Diabetes: New diagnosis at last [facility] appt. Was started on Lantus 22 units and Humalog 4 units three times a day + sliding scale inpatient. At last MT appt, we increased Lantus to 24 units and started metformin  mg x2 (1000 mg) daily.  No diarrhea, loose stools. Would rather take pills than insulin, especially due to cost ~$100. Has 2 pens left  Has AccuChek luciano on his phone to  record.   Blood sugars over the weekend:   Fasting am = 198, 189  ~4pm (over 2 hours after lunch) = 173, 134  ~9pm (over 2 hours after dinner) = 153, 142  He notices that he tends to run higher in the morning fasting  Last A1c checked 12/27/19 = 14.7%.   Hypoglycemia none  Hyperglycemia symptoms: none. Was feeling good until last few weeks which he attributes to his ear infection.   Using a AccuChek meter.   Microalbumin checked n/a  Is not taking a statin. Lipids checked in 2012.   Is not taking aspirin 81 mg for primary prevention.   Wt Readings from Last 3 Encounters:   03/09/20 (!) 362 lb 12.8 oz (164.6 kg)   01/08/20 (!) 352 lb 14.4 oz (160.1 kg)   12/29/19 (!) 358 lb 11.2 oz (162.7 kg)     Cellulitis of scrotum: Patient was in [facility] for 3 days 12/26-12/29 for right groin swelling found to have scrotal abscess which was I&Ded at bedside on 12/27. Reports resolved.      Ear Pain: Seen at [facility] on 3/9/20. Had cerumen removal and given acetic acid-hydrocortisone to use in ear. Reports he does not need the wax cleaned more, but even with the drops he is still having ear pain. Would like an antibiotics. He is putting cotton ball and soaking, but the medicine is soaking through.     PMH: reviewed in EPIC   Allergies/ADRs: reviewed in EPIC   Alcohol: reviewed in EPIC  Tobacco:   Social History     Tobacco Use   Smoking Status Never Smoker   Smokeless Tobacco Never Used     Today's Vitals: There were no vitals filed for this visit.  ----------------    The patient declined an after visit summary    I spent 15 minutes with this patient today;   All changes were made via collaborative practice agreement with Deep Goel MD. A copy of the visit note was provided to the patient's provider.     Rajni Hansen, Pharm.D., [facility]  Medication Therapy Management Pharmacist  [facility]     Current Outpatient Medications   Medication Sig Dispense Refill     acetaminophen (TYLENOL) 500 MG tablet Take 2  "tablets (1,000 mg total) by mouth every 6 (six) hours as needed.  0     acetic acid-hydrocortisone (VOSOL-HC) otic solution Administer 5 drops to the right ear 3 (three) times a day. 10 mL 0     amLODIPine (NORVASC) 5 MG tablet Take 5 mg by mouth daily.       blood glucose test strips Use to check blood sugars three times daily. Strips compatible with Dry Lube guide. Dispense brand per patient's insurance at pharmacy discretion. 300 strip 11     generic lancets Use to check blood sugars three times daily. Dispense brand per patient's insurance at pharmacy discretion. 300 each 11     LANTUS SOLOSTAR U-100 INSULIN 100 unit/mL (3 mL) pen Inject 24 Units under the skin at bedtime. 15 adj dose pen 0     metFORMIN (GLUCOPHAGE-XR) 500 MG 24 hr tablet Take 2 tablets (1,000 mg total) by mouth daily for 7 days. 180 tablet 0     metoprolol tartrate (LOPRESSOR) 100 MG tablet Take 100 mg by mouth 2 (two) times a day.       pen needle, diabetic 32 gauge x 5/32\" Ndle Use 1 each As Directed 4 (four) times a day as needed. 200 each 11     No current facility-administered medications for this visit.                              "

## 2021-06-07 NOTE — TELEPHONE ENCOUNTER
He is on insulin, therefore I would like him to check three times daily. I will send a new prescription now.

## 2021-06-07 NOTE — TELEPHONE ENCOUNTER
New Appointment Needed  What is the reason for the visit:    Same Date/Next Day Appt Request  What is the reason for your visit?: Been waiting on a phone visit- patient is now at work and still waiting on the call; he ask us to call him at least 3 times so he can step out and take the call.    Provider Preference: Dr. Bradley  How soon do you need to be seen?: ASAP  Waitlist offered?: No  Okay to leave a detailed message:  No

## 2021-06-07 NOTE — PROGRESS NOTES
Incision and drainage    Date/Time: 4/15/2020 3:31 PM  Performed by: James Aguilar MD  Authorized by: James Aguilar MD       Universal Protocol    Site marked: Yes    Prior images obtained and reviewed: Yes    Required items: required blood products, implants, devices, and special equipment available    Patient identity confirmed: verbally with patient    Reevaluation: Patient was reevaluated immediately before administering moderate or deep sedation or anesthesia    Confirmation checklist: patient's identity using two indicators    Time out: Immediately prior to procedure a time out was called to verify the correct patient, procedure, equipment, support staff and site/side marked as required    Universal Protocol: Joint Commission Universal Protocol was followed    Preparation: Patient was prepped and draped in the usual sterile fashion    Indications/Location  Type: abscess  Body area: trunk  Location: abdomen    Anesthesia    Local anesthesia used?: Yes    Anesthesia: local infiltration    Local anesthetic: lidocaine 1% with epinephrine    Anesthetic total (mL): 1    Sedation  Patient sedation: No    Procedure Details  Scalpel size: 11  Incision depth: dermal  Drainage: purulent  Drainage amount: scant  Wound treatment: wound left open  Packing material: none      Post-procedure   Length of time physician present for 1:1 monitoring during sedation: 0

## 2021-06-07 NOTE — PROGRESS NOTES
Lm Leigh is a 39 y.o. male who is being evaluated via a billable telephone visit.        HPI: The patient is seen today via virtual phone visit regarding persistent left ear pain. He was seen in [facility] and had bilateral ear irrigation. It isn't clear to me from the note whether they were able to remove all the wax at the time of irrigation. He was prescribed topical drops for otitis externa which has has used intermittently but he has persistent pain and plugging in the left ear. He is otherwise feeling well.     I have reviewed and updated the patient's Past Medical History, Social History, Family History and Medication List.    ALLERGIES  Shellfish containing products and Lisinopril      Assessment/Plan:  Problem List Items Addressed This Visit     None      Visit Diagnoses     Bilateral impacted cerumen    -  Primary      Given persistent symptoms, I am recommending patient be seen. Clinic appointment offered but he doesn't think he can make it in during clinic hours. [facility] will [facility].      Phone call duration:  4 minutes    Clare Beach MD

## 2021-06-07 NOTE — TELEPHONE ENCOUNTER
Pimple tender to the touch    It seems like it may be a boil.  But it has gotten bigger,and has a red, dark middle area,  Put neosporin on it after washing it last night.  It is getting worse.   He is afraid of infection.    Telephone appointment made with patient for today.  Kayli Hubbard RN  [facility] Triage/refill nurse              Reason for Disposition    Patient wants to be seen    Wound becomes more painful or swollen, 3 or more days after injury    Black (necrotic) color or blisters develop in wound    [1] Spreading redness around the boil AND [2] no fever    [1] Boil AND [2] diabetes mellitus or weak immune system (e.g., HIV positive, cancer chemotherapy, transplant patient)    Protocols used: WOUND INFECTION-A-OH, BOIL (SKIN ABSCESS)-A-AH

## 2021-06-07 NOTE — PROGRESS NOTES
Assessment/Plan:    1. Furuncle of skin or subcutaneous tissue  Furuncle noted pubic region.  I&D performed.  See procedure note.  Bactrim double strength prescribed twice daily x10 days.  Wound culture pending.  - sulfamethoxazole-trimethoprim (BACTRIM DS) 800-160 mg per tablet; Take 1 tablet by mouth 2 (two) times a day for 10 days.  Dispense: 20 tablet; Refill: 0  - Culture, Wound    2. Type 2 diabetes mellitus with hyperglycemia, without long-term current use of insulin (H)  Type 2 diabetes suboptimal control with prior A1c of 14.7% December 27, 2019.  Recently increase metformin extended release 500 mg from 2 tablets in the morning instead to 2 tablets twice daily beginning yesterday.  Has follow-up by phone with Rajni SMITH pharmacist May 4, 2020.  Continues Lantus 24 units daily.  Wants to wait on lab follow-up at this time and would reassess in perhaps July 2020 at follow-up office visit.    3. Essential hypertension  Blood pressure suboptimal control blood pressure 154/88 on recheck otherwise continues amlodipine 5 mg daily and metoprolol tartrate 100 mg twice daily.  Declines dose adjustment at this time.  Therapeutic lifestyle changes reviewed for further improvement.    4. Morbid obesity (H)  Dietary and exercise modifications reviewed for weight goal less than 300 pounds initially.      The following high BMI interventions were performed this visit: encouragement to exercise, weight monitoring, weight loss from baseline weight and lifestyle education regarding diet .  Ensure ongoing efforts to achieve weight goal < 300 pounds ideally.         Subjective:    Lm Leigh is seen today for skin abscess.  Lower groin midline.  Is noticed this over past several weeks.  Getting more painful and larger.  Had concern previously with described scrotal abscess.  Was drained by urologist following admission to hospital.  This seems different.  Does have type 2 diabetes poorly controlled with A1c of 14.7%  "historically.  Recently doubled his dose of metformin extended release from 1000 g up to 2000 mg total dose daily plus Lantus continuing 24 units daily.  Working with Rajni SMITH pharmacist dose adjustment tolerates amlodipine 5 mg daily metoprolol tartrate 100 mg twice daily.  No fevers or chills.  No urinary symptoms.  Comprehensive review of systems as above otherwise all negative.    Single  No children  Tobacco:  none  EtOH:  never (by choice) - grandparents with EtOH issue  Mom - type 2 diabetes  Dad -   1 younger sis -   Surgeries:  none  Hospitalizations:  right groin drainage at [facility] after admission ([facility]) with infection into testicle...  Work:  [employer]  Hobbies:  collect autographs    History reviewed. No pertinent surgical history.     History reviewed. No pertinent family history.     Past Medical History:   Diagnosis Date     Hypertension      Obesity         Social History     Tobacco Use     Smoking status: Never Smoker     Smokeless tobacco: Never Used   Substance Use Topics     Alcohol use: Not Currently     Drug use: Not Currently        Current Outpatient Medications   Medication Sig Dispense Refill     amLODIPine (NORVASC) 5 MG tablet Take 5 mg by mouth daily.       blood glucose test strips Use to check blood sugars three times daily. Strips compatible with Accuchek guide. Dispense brand per patient's insurance at pharmacy discretion. 300 strip 11     generic lancets Use to check blood sugars three times daily. Dispense brand per patient's insurance at pharmacy discretion. 300 each 11     LANTUS SOLOSTAR U-100 INSULIN 100 unit/mL (3 mL) pen Inject 24 Units under the skin at bedtime. 15 mL 2     metFORMIN (GLUCOPHAGE-XR) 500 MG 24 hr tablet Take 2 tablets (1,000 mg total) by mouth 2 (two) times a day. 360 tablet 0     metoprolol tartrate (LOPRESSOR) 100 MG tablet Take 100 mg by mouth 2 (two) times a day.       pen needle, diabetic 32 gauge x 5/32\" Ndle Use 1 each As Directed 4 (four) " times a day as needed. 200 each 11     acetaminophen (TYLENOL) 500 MG tablet Take 2 tablets (1,000 mg total) by mouth every 6 (six) hours as needed.  0     acetic acid-hydrocortisone (VOSOL-HC) otic solution Administer 5 drops to the right ear 3 (three) times a day. 10 mL 0     sulfamethoxazole-trimethoprim (BACTRIM DS) 800-160 mg per tablet Take 1 tablet by mouth 2 (two) times a day for 10 days. 20 tablet 0     No current facility-administered medications for this visit.           Objective:    Vitals:    04/15/20 1444   BP: (!) 190/86   Pulse: 92   SpO2: 95%   Weight: (!) 374 lb (169.6 kg)      Body mass index is 50.03 kg/m .    Alert.  No apparent distress.  Indurated and red area in pubic region.  Mild fluctuance centrally.  Please see procedure note for I&D procedure tolerated well with purulent drainage noted.  Wound culture obtained.  No regional lymphadenopathy.  Blood pressure 154/88 on recheck.      This note has been dictated using voice recognition software and as a result may contain minor grammatical errors and unintended word substitutions.

## 2021-06-07 NOTE — PROGRESS NOTES
MTM Follow Up Encounter  Assessment & Plan                                                     Ear Pain: Resolved.     Furuncle of skin: Resolved.     Hypertension: Last BP was significantly elevated. Will recheck with labs. Depending on microalbuminuria, may benefit from ACE/ARB   PLAN:   1. Future BP recheck     Type 2 Diabetes: BG improved and tolerating higher metformin dose. Patient is still slightly elevated in the morning, but will not increase insulin further at this time. Patient has gained weight, likely due to insulin. Once we can get him into clinic, will discuss the addition on a GLP1 agonist to lessen or even eliminate his insulin need and to help with weight loss. Stay off Humalog.   Recommended recheck of A1c, but difficult with his work schedule right now. I will reach out to him when lab hours change. Will also check microalbuminuria and fasting lipids. No aspirin right now due to age.   PLAN:   1. Future a1c, microalbuminuria, and lipids   Future consideration = start weekly GLP1 agonist    Follow Up  Post-COVID (patient works at 9am and has only sundays off).     Subjective & Objective                                                       Lm Leigh is a 39 y.o. male called for a follow up visit for Medication Therapy Management. He was referred to me from Deep Goel MD    Patient consented to a telehealth visit: Yes    Chief Complaint: follow up since last MTM appt 4/6/20    Medication Adherence/Access: no issues. Was able to use Lantus coupon and it reduced the cost from $100 to $50.     Ear Pain: Seen at [facility] on 3/9/20. Had cerumen removal and given acetic acid-hydrocortisone to use in ear. At last MTM appt he was still having ear pain. Phone appt with Dr. Beach on 4/10/20 and she recommended coming into clinic for appt. Reports antibiotic from skin infection cleared up his ear pain and he no longer is in pain.     Furuncle of skin: In pubic region. Underwent incision and drainage on  4/15/20 with Dr. Aguilar. Given Bactrim two times a day x 10 days. Completed abx and reports that his skin has healed.     Hypertension: Currently taking amlodipine 5 mg daily and metoprolol tartrate 100 mg two times a day. Was not interested in dose adjustment when he saw Dr. Aguilar.  Patient does not monitor BP at home, but checked at [facility] after appointment. Does not remember value, but reports it was better. Was nervous when he was in clinic.   BP Readings from Last 3 Encounters:   04/15/20 (!) 190/86   03/09/20 151/85   01/08/20 132/80     Type 2 Diabetes: New diagnosis Dec 2019. Was started on Lantus 22 units and Humalog 4 units three times a day + sliding scale inpatient. At [facility] appt on 1/13/20, we increased Lantus to 24 units, stopped Humalog, and started metformin 1000 mg/day -- increased up to 2000 mg/day at last [facility] appt 4/6/20.   No diarrhea, loose stools.   Would rather take pills than insulin. Lantus cost decreased to $50 with coupon  Has AccuChek luciano on his phone to record. Checks blood sugars three times daily (9am, 3pm, 9pm)  Reports fasting AM now 150-160s (used to be 190-200s). 3pm and 9pm BG values 140-150s.   Last A1c checked 12/27/19 = 14.7%.   Hypoglycemia none  Hyperglycemia symptoms: none. Feeling good now.   Using a AccuChek meter.   Microalbumin checked n/a  Is not taking a statin. Lipids checked in 2012.   Is not taking aspirin 81 mg for primary prevention.         Wt Readings from Last 3 Encounters:   04/15/20 (!) 374 lb (169.6 kg)   03/09/20 (!) 362 lb 12.8 oz (164.6 kg)   01/08/20 (!) 352 lb 14.4 oz (160.1 kg)         PMH: reviewed in EPIC   Allergies/ADRs: reviewed in EPIC   Alcohol: reviewed in Epic - does not drink etoh by choice   Tobacco:   Social History     Tobacco Use   Smoking Status Never Smoker   Smokeless Tobacco Never Used     Today's Vitals: There were no vitals filed for this visit.  ----------------    The patient declined an after visit summary    I spent 15 minutes with  "this patient today;   All changes were made via collaborative practice agreement with Deep Goel MD. A copy of the visit note was provided to the patient's provider.     Rajni Hansen, Pharm.D., Psychiatric  Medication Therapy Management Pharmacist  [facility]    Telemedicine Visit Details    Type of service:  Telephone     Start Time: 8:14  End Time (time video/phone call stopped): 8:27    Originating Location (pt. Location): Home    Distant Location (provider location):  [facility] MEDICATION THERAPY MANAGEMENT [facility]    Mode of Communication:   Telephone     Current Outpatient Medications   Medication Sig Dispense Refill     acetaminophen (TYLENOL) 500 MG tablet Take 2 tablets (1,000 mg total) by mouth every 6 (six) hours as needed.  0     amLODIPine (NORVASC) 5 MG tablet Take 5 mg by mouth daily.       blood glucose test strips Use to check blood sugars three times daily. Strips compatible with Accuchek guide. Dispense brand per patient's insurance at pharmacy discretion. 300 strip 11     generic lancets Use to check blood sugars three times daily. Dispense brand per patient's insurance at pharmacy discretion. 300 each 11     LANTUS SOLOSTAR U-100 INSULIN 100 unit/mL (3 mL) pen Inject 24 Units under the skin at bedtime. 15 mL 2     metFORMIN (GLUCOPHAGE-XR) 500 MG 24 hr tablet Take 2 tablets (1,000 mg total) by mouth 2 (two) times a day. 360 tablet 0     metoprolol tartrate (LOPRESSOR) 100 MG tablet Take 100 mg by mouth 2 (two) times a day.       pen needle, diabetic 32 gauge x 5/32\" Ndle Use 1 each As Directed 4 (four) times a day as needed. 200 each 11     No current facility-administered medications for this visit.                              "

## 2021-06-07 NOTE — TELEPHONE ENCOUNTER
Left message to call back for: pt  Information to relay to patient:  Left message to call and schedule phone visit with a provider this week for ear pain follow up from [facility].

## 2021-06-09 NOTE — PROGRESS NOTES
Chief Complaint:  Chief Complaint   Patient presents with     Ear Pain     x5-6d, bilateral but mostly R ear, would like ears checked       HPI:Lm Leigh is an 39 y.o. male who presents for possible ear infection. Symptoms include  bilateral ear pain. Onset 5 days, unchanged since that time. Patient is eating and drinking well. No fever, diarrhea or vomiting. No cough, or  Wheezing.    ROS:  Review of Systems   Constitutional: Negative.  Negative for activity change, appetite change, fatigue, fever and unexpected weight change.   HENT: Positive for ear pain. Negative for congestion, ear discharge, sinus pressure, sinus pain, sore throat and trouble swallowing.    Eyes: Negative for pain, discharge, redness and itching.   Respiratory: Negative.  Negative for chest tightness, shortness of breath and wheezing.    Cardiovascular: Negative.  Negative for chest pain and palpitations.   Gastrointestinal: Negative.  Negative for abdominal pain, blood in stool, diarrhea, nausea and vomiting.   Endocrine: Negative.  Negative for polydipsia.   Genitourinary: Negative.  Negative for dysuria, frequency and urgency.   Musculoskeletal: Negative.  Negative for arthralgias and myalgias.   Skin: Negative.  Negative for color change, rash and wound.   Allergic/Immunologic: Negative.  Negative for immunocompromised state.   Neurological: Negative.  Negative for dizziness and headaches.   Hematological: Negative.  Negative for adenopathy.        Respiratory History  occasional episodes of bronchitis    Family History  No family history on file.     Problem history  Patient Active Problem List   Diagnosis     Sprain, hamstring     Cellulitis of scrotum     Morbid obesity (H)     Essential hypertension     Type 2 diabetes mellitus with hyperglycemia, without long-term current use of insulin (H)        Allergies  Allergies   Allergen Reactions     Shellfish Containing Products Anaphylaxis     Lisinopril Cough        Social  History  Social History     Socioeconomic History     Marital status: Single     Spouse name: Not on file     Number of children: Not on file     Years of education: Not on file     Highest education level: Not on file   Occupational History     Not on file   Social Needs     Financial resource strain: Not on file     Food insecurity     Worry: Not on file     Inability: Not on file     Transportation needs     Medical: Not on file     Non-medical: Not on file   Tobacco Use     Smoking status: Never Smoker     Smokeless tobacco: Never Used   Substance and Sexual Activity     Alcohol use: Not Currently     Drug use: Not Currently     Sexual activity: Not on file   Lifestyle     Physical activity     Days per week: Not on file     Minutes per session: Not on file     Stress: Not on file   Relationships     Social connections     Talks on phone: Not on file     Gets together: Not on file     Attends Yarsani service: Not on file     Active member of club or organization: Not on file     Attends meetings of clubs or organizations: Not on file     Relationship status: Not on file     Intimate partner violence     Fear of current or ex partner: Not on file     Emotionally abused: Not on file     Physically abused: Not on file     Forced sexual activity: Not on file   Other Topics Concern     Not on file   Social History Narrative     Not on file        Current Meds    Current Outpatient Medications:      acetaminophen (TYLENOL) 500 MG tablet, Take 2 tablets (1,000 mg total) by mouth every 6 (six) hours as needed., Disp: , Rfl: 0     amLODIPine (NORVASC) 5 MG tablet, Take 5 mg by mouth daily., Disp: , Rfl:      LANTUS SOLOSTAR U-100 INSULIN 100 unit/mL (3 mL) pen, Inject 24 Units under the skin at bedtime., Disp: 15 mL, Rfl: 2     metFORMIN (GLUCOPHAGE-XR) 500 MG 24 hr tablet, Take 2 tablets (1,000 mg total) by mouth 2 (two) times a day., Disp: 360 tablet, Rfl: 0     metoprolol tartrate (LOPRESSOR) 100 MG tablet, Take 100  "mg by mouth 2 (two) times a day., Disp: , Rfl:      blood glucose test strips, Use to check blood sugars three times daily. Strips compatible with Accuchek guide. Dispense brand per patient's insurance at pharmacy discretion., Disp: 300 strip, Rfl: 11     ciprofloxacin-dexamethasone (CIPRODEX) otic suspension, Administer 4 drops to the right ear 2 (two) times a day for 7 days., Disp: 7.5 mL, Rfl: 0     generic lancets, Use to check blood sugars three times daily. Dispense brand per patient's insurance at pharmacy discretion., Disp: 300 each, Rfl: 11     pen needle, diabetic 32 gauge x 5/32\" Ndle, Use to inject insulin once daily, Disp: 100 each, Rfl: 11     Physical Exam:     Vital signs reviewed by Anson Pickett  /90 (Patient Site: Right Arm, Patient Position: Sitting, Cuff Size: Adult Large)   Pulse 95   Temp 98.1  F (36.7  C) (Oral)   Resp 16   Wt (!) 374 lb (169.6 kg)   SpO2 97%   BMI 50.03 kg/m       Physical Exam:    Physical Exam  Vitals signs reviewed.   Constitutional:       General: He is not in acute distress.     Appearance: Normal appearance. He is well-developed. He is not ill-appearing, toxic-appearing or diaphoretic.   HENT:      Head: Normocephalic and atraumatic.      Right Ear: Swelling and tenderness present. No drainage. Tympanic membrane is not perforated, erythematous, retracted or bulging.      Left Ear: No drainage, swelling or tenderness. Tympanic membrane is not perforated, erythematous, retracted or bulging.      Nose: No mucosal edema, congestion or rhinorrhea.      Mouth/Throat:      Pharynx: No pharyngeal swelling, oropharyngeal exudate, posterior oropharyngeal erythema or uvula swelling.      Tonsils: No tonsillar exudate or tonsillar abscesses. 0 on the right. 0 on the left.   Eyes:      General: Lids are normal.         Right eye: No foreign body, discharge or hordeolum.         Left eye: No foreign body or discharge.      Extraocular Movements:      Right eye: Normal " extraocular motion.      Left eye: Normal extraocular motion.      Conjunctiva/sclera:      Right eye: Right conjunctiva is not injected. No chemosis or exudate.     Left eye: Left conjunctiva is not injected. No chemosis or exudate.  Neck:      Musculoskeletal: Full passive range of motion without pain and normal range of motion. Normal range of motion. No edema, erythema or neck rigidity.      Thyroid: No thyroid mass.   Cardiovascular:      Rate and Rhythm: Normal rate and regular rhythm.      Pulses: Normal pulses.      Heart sounds: Normal heart sounds, S1 normal and S2 normal. No murmur. No friction rub. No gallop.    Pulmonary:      Effort: Pulmonary effort is normal. No accessory muscle usage, respiratory distress or retractions.      Breath sounds: Normal breath sounds. No stridor, decreased air movement or transmitted upper airway sounds. No decreased breath sounds, wheezing, rhonchi or rales.   Abdominal:      General: Bowel sounds are normal. There is no distension.      Palpations: Abdomen is soft. There is no mass or pulsatile mass.      Tenderness: There is no abdominal tenderness. There is no right CVA tenderness, left CVA tenderness, guarding or rebound.   Musculoskeletal:      Right lower leg: No edema.      Left lower leg: No edema.   Lymphadenopathy:      Head:      Right side of head: No submental, submandibular, tonsillar, preauricular or posterior auricular adenopathy.      Left side of head: No submental, submandibular, tonsillar, preauricular or posterior auricular adenopathy.      Cervical: No cervical adenopathy.      Right cervical: No superficial cervical adenopathy.     Left cervical: No superficial cervical adenopathy.   Skin:     General: Skin is warm.      Coloration: Skin is not cyanotic or jaundiced.   Neurological:      Mental Status: He is alert and oriented to person, place, and time.      Cranial Nerves: Cranial nerves are intact.      Sensory: Sensation is intact.      Motor:  Motor function is intact. No weakness or abnormal muscle tone.      Gait: Gait is intact. Gait normal.   Psychiatric:         Attention and Perception: Attention normal.         Mood and Affect: Mood normal.         Speech: Speech normal.         Behavior: Behavior normal. Behavior is cooperative.          ASSESSMENT     1. Infective otitis externa, right    2. Type 2 diabetes mellitus with hyperglycemia, without long-term current use of insulin (H)    3. Essential hypertension         PLAN  Fluids, vaporizer, acetaminophen, and or ibuprofen for pain.  Rx for Ciprodex today.  Continue to monitor blood sugars with illness.  Last A1C was 14.7 on 12/27/2019  Patient is hypertensive in clinic today.  Follow up with PCP  In the next week for DM and BP recheck.  Follow up with your PCP immediately if symptoms worsen.  Worrisome symptoms discussed with instructions to go to the ED.  Patient verbalized understanding and agreed with this plan.       Anson Pickett  6/25/2020, 6:07 PM

## 2021-06-09 NOTE — TELEPHONE ENCOUNTER
RN cannot approve Refill Request    RN can NOT refill this medication PCP messaged that patient is overdue for Labs and Office Visit. Last office visit: 1/8/2020 Deep Goel MD Last Physical: Visit date not found Last MTM visit: Visit date not found Last visit same specialty: 4/15/2020 James Aguilar MD.  Next visit within 3 mo: Visit date not found  Next physical within 3 mo: Visit date not found      Brenda Davila, [facility] Triage/Med Refill 7/3/2020    Requested Prescriptions   Pending Prescriptions Disp Refills     metFORMIN (GLUCOPHAGE-XR) 500 MG 24 hr tablet [Pharmacy Med Name: METFORMIN HCL  MG TABLET] 360 tablet 0     Sig: TAKE 2 TABLETS BY MOUTH TWICE A DAY       Metformin Refill Protocol Failed - 7/2/2020 12:32 PM        Failed - A1C in last 6 months     Hemoglobin A1c   Date Value Ref Range Status   12/27/2019 14.7 (H) 4.2 - 6.1 % Final               Failed - Microalbumin in last year      No results found for: MICROALBUR               Passed - Blood pressure in last 12 months     BP Readings from Last 1 Encounters:   06/25/20 144/90             Passed - LFT or AST or ALT in last 12 months     Albumin   Date Value Ref Range Status   12/28/2019 2.7 (L) 3.5 - 5.0 g/dL Final     Bilirubin, Total   Date Value Ref Range Status   12/28/2019 0.7 0.0 - 1.0 mg/dL Final     Alkaline Phosphatase   Date Value Ref Range Status   12/28/2019 55 45 - 120 U/L Final     AST   Date Value Ref Range Status   12/28/2019 9 0 - 40 U/L Final     ALT   Date Value Ref Range Status   12/28/2019 17 0 - 45 U/L Final     Protein, Total   Date Value Ref Range Status   12/28/2019 6.4 6.0 - 8.0 g/dL Final                Passed - GFR or Serum Creatinine in last 6 months     GFR MDRD Non Af Amer   Date Value Ref Range Status   12/28/2019 >60 >60 mL/min/1.73m2 Final     GFR MDRD Af Amer   Date Value Ref Range Status   12/28/2019 >60 >60 mL/min/1.73m2 Final             Passed - Visit with PCP or prescribing provider  visit in last 6 months or next 3 months     Last office visit with prescriber/PCP: 1/8/2020 OR same dept: 4/15/2020 James Aguilar MD OR same specialty: 4/15/2020 James Aguilar MD Last physical: Visit date not found Last MTM visit: Visit date not found         Next appt within 3 mo: Visit date not found  Next physical within 3 mo: Visit date not found  Prescriber OR PCP: Deep Goel MD  Last diagnosis associated with med order: 1. Type 2 diabetes mellitus with hyperglycemia, without long-term current use of insulin (H)  - metFORMIN (GLUCOPHAGE-XR) 500 MG 24 hr tablet [Pharmacy Med Name: METFORMIN HCL  MG TABLET]; TAKE 2 TABLETS BY MOUTH TWICE A DAY  Dispense: 360 tablet; Refill: 0     If protocol passes may refill for 12 months if within 3 months of last provider visit (or a total of 15 months).

## 2021-06-10 NOTE — TELEPHONE ENCOUNTER
Per review of chart patient continues amlodpine 5 mg daily and metoprolol tartrate 100 mg two times a day.

## 2021-06-10 NOTE — TELEPHONE ENCOUNTER
Medication Request  Medication name:    Disp  Refills  Start  End     amLODIPine (NORVASC) 5 MG tablet    10/11/2019      Sig - Route: Take 5 mg by mouth daily. - Oral     Class: Historical Med        Disp  Refills  Start  End     metoprolol tartrate (LOPRESSOR) 100 MG tablet    10/11/2019      Sig - Route: Take 100 mg by mouth 2 (two) times a day. - Oral     Class: Historical Med       Requested Pharmacy: [facility] #[id number]  Reason for request: New prescription   When did you use medication last?:  Today   Patient offered appointment:  n/a  Okay to leave a detailed message: yes  [phone]    FYI: These medications are listed as historical medications on the patient's current medication list.

## 2021-06-11 NOTE — TELEPHONE ENCOUNTER
RN cannot approve Refill Request    RN can NOT refill this medication PCP messaged that patient is overdue for Labs and Protocol failed and NO refill given. Last office visit: 1/8/2020 Deep Goel MD Last Physical: Visit date not found Last MTM visit: Visit date not found Last visit same specialty: 4/15/2020 James Aguilar MD.  Next visit within 3 mo: Visit date not found  Next physical within 3 mo: Visit date not found      Sarah Wolf, [facility] Triage/Med Refill 9/30/2020    Requested Prescriptions   Pending Prescriptions Disp Refills     metFORMIN (GLUCOPHAGE-XR) 500 MG 24 hr tablet [Pharmacy Med Name: METFORMIN HCL  MG TABLET] 360 tablet 0     Sig: TAKE 2 TABLETS BY MOUTH TWICE A DAY       Metformin Refill Protocol Failed - 9/28/2020 12:41 AM        Failed - A1C in last 6 months     Hemoglobin A1c   Date Value Ref Range Status   12/27/2019 14.7 (H) 4.2 - 6.1 % Final               Failed - Microalbumin in last year      No results found for: MICROALBUR               Passed - Blood pressure in last 12 months     BP Readings from Last 1 Encounters:   06/25/20 144/90             Passed - LFT or AST or ALT in last 12 months     Albumin   Date Value Ref Range Status   12/28/2019 2.7 (L) 3.5 - 5.0 g/dL Final     Bilirubin, Total   Date Value Ref Range Status   12/28/2019 0.7 0.0 - 1.0 mg/dL Final     Alkaline Phosphatase   Date Value Ref Range Status   12/28/2019 55 45 - 120 U/L Final     AST   Date Value Ref Range Status   12/28/2019 9 0 - 40 U/L Final     ALT   Date Value Ref Range Status   12/28/2019 17 0 - 45 U/L Final     Protein, Total   Date Value Ref Range Status   12/28/2019 6.4 6.0 - 8.0 g/dL Final                Passed - GFR or Serum Creatinine in last 6 months     GFR MDRD Non Af Amer   Date Value Ref Range Status   12/28/2019 >60 >60 mL/min/1.73m2 Final     GFR MDRD Af Amer   Date Value Ref Range Status   12/28/2019 >60 >60 mL/min/1.73m2 Final             Passed - Visit with PCP or  prescribing provider visit in last 6 months or next 3 months     Last office visit with prescriber/PCP: Visit date not found OR same dept: 4/15/2020 James Aguilar MD OR same specialty: 4/15/2020 James Aguilar MD Last physical: Visit date not found Last MTM visit: Visit date not found         Next appt within 3 mo: Visit date not found  Next physical within 3 mo: Visit date not found  Prescriber OR PCP: Deep Goel MD  Last diagnosis associated with med order: 1. Type 2 diabetes mellitus with hyperglycemia, without long-term current use of insulin (H)  - metFORMIN (GLUCOPHAGE-XR) 500 MG 24 hr tablet [Pharmacy Med Name: METFORMIN HCL  MG TABLET]; TAKE 2 TABLETS BY MOUTH TWICE A DAY  Dispense: 360 tablet; Refill: 0     If protocol passes may refill for 12 months if within 3 months of last provider visit (or a total of 15 months).

## 2021-06-12 NOTE — TELEPHONE ENCOUNTER
RN cannot approve Refill Request    RN can NOT refill this medication Protocol failed and NO refill given. Last office visit: 1/8/2020 Deep Gole MD Last Physical: Visit date not found Last MTM visit: Visit date not found Last visit same specialty: 4/15/2020 James Aguilar MD.  Next visit within 3 mo: Visit date not found  Next physical within 3 mo: Visit date not found      Alie Abad, [facility] Triage/Med Refill 10/18/2020    Requested Prescriptions   Pending Prescriptions Disp Refills     LANTUS SOLOSTAR U-100 INSULIN 100 unit/mL (3 mL) pen [Pharmacy Med Name: LANTUS SOLOSTAR 100 UNIT/ML] 15 mL 2     Sig: INJECT 24 UNITS UNDER THE SKIN AT BEDTIME.       Insulin/GLP-1 Refill Protocol Failed - 10/17/2020 12:34 AM        Failed - Visit with PCP or prescribing provider visit in last 6 months     Last office visit with prescriber/PCP: Visit date not found OR same dept: 4/15/2020 James Aguilar MD OR same specialty: 4/15/2020 James Aguilar MD Last physical: Visit date not found Last MTM visit: Visit date not found     Next appt within 3 mo: Visit date not found  Next physical within 3 mo: Visit date not found  Prescriber OR PCP: Deep Goel MD  Last diagnosis associated with med order: 1. Type 2 diabetes mellitus with hyperglycemia, without long-term current use of insulin (H)  - LANTUS SOLOSTAR U-100 INSULIN 100 unit/mL (3 mL) pen [Pharmacy Med Name: LANTUS SOLOSTAR 100 UNIT/ML]; Inject 24 Units under the skin at bedtime.  Dispense: 15 mL; Refill: 2    If protocol passes may refill for 6 months if within 3 months of last provider visit (or a total of 9 months).              Failed - A1C in last 6 months     Hemoglobin A1c   Date Value Ref Range Status   12/27/2019 14.7 (H) 4.2 - 6.1 % Final               Failed - Microalbumin in last year     No results found for: MICROALBUR               Passed - Blood pressure in last year     BP Readings from Last 1 Encounters:   06/25/20 144/90              Passed - Creatinine done in last year     Creatinine   Date Value Ref Range Status   12/28/2019 0.75 0.70 - 1.30 mg/dL Final

## 2021-06-13 NOTE — TELEPHONE ENCOUNTER
Refill Approved    Rx renewed per Medication Renewal Policy. Medication was last renewed on 8/21/20.    Freya Fletcher, [facility] [place] Connection Triage/Med Refill 11/10/2020     Requested Prescriptions   Pending Prescriptions Disp Refills     amLODIPine (NORVASC) 5 MG tablet [Pharmacy Med Name: AMLODIPINE BESYLATE 5 MG TAB] 90 tablet 0     Sig: TAKE 1 TABLET BY MOUTH EVERY DAY       Calcium-Channel Blockers Protocol Passed - 11/8/2020  3:59 PM        Passed - PCP or prescribing provider visit in past 12 months or next 3 months     Last office visit with prescriber/PCP: 1/8/2020 Deep Goel MD OR same dept: 4/15/2020 James Aguilar MD OR same specialty: 4/15/2020 James Aguilar MD  Last physical: Visit date not found Last MTM visit: Visit date not found   Next visit within 3 mo: Visit date not found  Next physical within 3 mo: Visit date not found  Prescriber OR PCP: Deep Goel MD  Last diagnosis associated with med order: 1. Essential hypertension  - amLODIPine (NORVASC) 5 MG tablet [Pharmacy Med Name: AMLODIPINE BESYLATE 5 MG TAB]; TAKE 1 TABLET BY MOUTH EVERY DAY  Dispense: 90 tablet; Refill: 0  - metoprolol tartrate (LOPRESSOR) 100 MG tablet [Pharmacy Med Name: METOPROLOL TARTRATE 100 MG TAB]; TAKE 1 TABLET BY MOUTH TWICE A DAY  Dispense: 180 tablet; Refill: 0    If protocol passes may refill for 12 months if within 3 months of last provider visit (or a total of 15 months).             Passed - Blood pressure filed in past 12 months     BP Readings from Last 1 Encounters:   06/25/20 144/90                metoprolol tartrate (LOPRESSOR) 100 MG tablet [Pharmacy Med Name: METOPROLOL TARTRATE 100 MG TAB] 180 tablet 0     Sig: TAKE 1 TABLET BY MOUTH TWICE A DAY       Beta-Blockers Refill Protocol Passed - 11/8/2020  3:59 PM        Passed - PCP or prescribing provider visit in past 12 months or next 3 months     Last office visit with prescriber/PCP: 1/8/2020 Deep Goel MD OR same dept: 4/15/2020 Jeff  James MATUTE MD OR same specialty: 4/15/2020 James Aguilar MD  Last physical: Visit date not found Last MTM visit: Visit date not found   Next visit within 3 mo: Visit date not found  Next physical within 3 mo: Visit date not found  Prescriber OR PCP: Deep Goel MD  Last diagnosis associated with med order: 1. Essential hypertension  - amLODIPine (NORVASC) 5 MG tablet [Pharmacy Med Name: AMLODIPINE BESYLATE 5 MG TAB]; TAKE 1 TABLET BY MOUTH EVERY DAY  Dispense: 90 tablet; Refill: 0  - metoprolol tartrate (LOPRESSOR) 100 MG tablet [Pharmacy Med Name: METOPROLOL TARTRATE 100 MG TAB]; TAKE 1 TABLET BY MOUTH TWICE A DAY  Dispense: 180 tablet; Refill: 0    If protocol passes may refill for 12 months if within 3 months of last provider visit (or a total of 15 months).             Passed - Blood pressure filed in past 12 months     BP Readings from Last 1 Encounters:   06/25/20 144/90

## 2021-06-13 NOTE — TELEPHONE ENCOUNTER
RN cannot approve Refill Request    RN can NOT refill this medication Protocol failed and NO refill given. Last office visit: 1/8/2020 Deep Goel MD Last Physical: Visit date not found Last MTM visit: Visit date not found Last visit same specialty: 4/15/2020 James Aguilar MD.  Next visit within 3 mo: Visit date not found  Next physical within 3 mo: Visit date not found      Freya Fletcher, [facility] Connection Triage/Med Refill 12/10/2020    Requested Prescriptions   Pending Prescriptions Disp Refills     metFORMIN (GLUCOPHAGE-XR) 500 MG 24 hr tablet [Pharmacy Med Name: METFORMIN HCL  MG TABLET] 360 tablet 0     Sig: TAKE 2 TABLETS BY MOUTH TWICE A DAY       Metformin Refill Protocol Failed - 12/9/2020  9:03 PM        Failed - Visit with PCP or prescribing provider visit in last 6 months or next 3 months     Last office visit with prescriber/PCP: Visit date not found OR same dept: 4/15/2020 James Aguilar MD OR same specialty: 4/15/2020 James Aguilar MD Last physical: Visit date not found Last MTM visit: Visit date not found         Next appt within 3 mo: Visit date not found  Next physical within 3 mo: Visit date not found  Prescriber OR PCP: Deep Goel MD  Last diagnosis associated with med order: 1. Type 2 diabetes mellitus with hyperglycemia, without long-term current use of insulin (H)  - metFORMIN (GLUCOPHAGE-XR) 500 MG 24 hr tablet [Pharmacy Med Name: METFORMIN HCL  MG TABLET]; TAKE 2 TABLETS BY MOUTH TWICE A DAY  Dispense: 360 tablet; Refill: 0     If protocol passes may refill for 12 months if within 3 months of last provider visit (or a total of 15 months).           Failed - A1C in last 6 months     Hemoglobin A1c   Date Value Ref Range Status   12/27/2019 14.7 (H) 4.2 - 6.1 % Final               Failed - Microalbumin in last year      No results found for: MICROALBUR               Passed - Blood pressure in last 12 months     BP Readings from Last 1 Encounters:   12/04/20 (!)  173/96             Passed - LFT or AST or ALT in last 12 months     Albumin   Date Value Ref Range Status   12/28/2019 2.7 (L) 3.5 - 5.0 g/dL Final     Bilirubin, Total   Date Value Ref Range Status   12/28/2019 0.7 0.0 - 1.0 mg/dL Final     Alkaline Phosphatase   Date Value Ref Range Status   12/28/2019 55 45 - 120 U/L Final     AST   Date Value Ref Range Status   12/28/2019 9 0 - 40 U/L Final     ALT   Date Value Ref Range Status   12/28/2019 17 0 - 45 U/L Final     Protein, Total   Date Value Ref Range Status   12/28/2019 6.4 6.0 - 8.0 g/dL Final                Passed - GFR or Serum Creatinine in last 6 months     GFR MDRD Non Af Amer   Date Value Ref Range Status   12/28/2019 >60 >60 mL/min/1.73m2 Final     GFR MDRD Af Amer   Date Value Ref Range Status   12/28/2019 >60 >60 mL/min/1.73m2 Final

## 2021-06-14 NOTE — PROGRESS NOTES
Assessment/Plan:    1. Type 2 diabetes mellitus with hyperglycemia, without long-term current use of insulin (H)  Type 2 diabetes suboptimal control.  Improvement with A1c decreasing from 14.7% December 27, 2019 down to 10.9% today.  Did instruct patient to increase Lantus from 24 units up to 30 units then titrate by 2 units every 2 days for fasting glucose in a.m. less than 140 ideally.  Referral placed back to see diabetes education to get further information on diabetes management as well as insulin adjustment.  May benefit from continuous glucose monitoring, consideration for freestyle neida versus Dexcom G6, etc.  Med monitoring.  Microalbumin screen.  Annual diabetic eye exams.  Monofilament testing today was normal.  Follow-up in just over 3 months for goal A1c less than 8% initially.  We will check lipid cascade and consider statin at follow-up.  - Glycosylated Hemoglobin A1c  - Lipid Profile  - Basic Metabolic Panel  - Microalbumin, Random Urine; Future  - Ambulatory referral to Diabetes Education Program (CDE)  - LANTUS SOLOSTAR U-100 INSULIN 100 unit/mL (3 mL) pen; Inject 40 Units under the skin at bedtime.  Dispense: 15 mL; Refill: 2    2. Essential hypertension  Hypertension suboptimal control blood pressure 140/82 on subsequent recheck.  Addition of losartan 50 mg daily with prior cough associated with lisinopril noted.  Will remain on amlodipine 5 mg daily metoprolol tartrate 100 mg twice daily as well with blood pressure recheck at follow-up for goal less than 130/80 ideally.  - Lipid Profile  - losartan (COZAAR) 50 MG tablet; Take 1 tablet (50 mg total) by mouth daily.  Dispense: 90 tablet; Refill: 3    3. Morbid obesity (H)  Dietary and exercise modifications to continue for weight goal less than 300 pounds ideally.    4. Need for hepatitis C screening test  Routine hepatitis C screen, low risk.  - Hepatitis C Antibody (Anti-HCV)    5. Encounter for immunization  Seasonal flu shot and Pneumovax  immunization provided today.  - Influenza, Seasonal Quad, PF =/> 6months  - Pneumococcal polysaccharide vaccine 23-valent 3 yo or older, subq/IM    25 minutes total time with patient, > 50% with counseling and coordination of cares.       Subjective:    Lm Leigh is seen today for follow-up evaluation.  Type 2 diabetes.  Previous diagnosis with A1c of 14.7% December 27, 2019.  Has not been seen recently.  Continues Metformin 500 mg 4 times daily.  Lantus 24 units daily.  Continues amlodipine 5 mg daily metoprolol tartrate 100 mg twice daily for hypertension management.  Checks blood sugars more recently up to 4 times in a day blood sugars can be 1 60-1 70 occasionally above 200.  No hypoglycemic episodes noted.  Does not need specific med refills at this time.  Denies recent illness.  Did have left testicular pain a couple days ago that was short-lived and has resolved without urinary symptoms associated.  Some anxiety associated with prior hospitalization through [facility] due to right groin infection apparently.  Comprehensive review of systems as above otherwise all negative.    Single  No children  Tobacco: none  EtOH: never (by choice) - grandparents with EtOH issue  Mom - type 2 diabetes  Dad -   1 younger sis -   Surgeries: none  Hospitalizations: right groin drainage at [facility] after admission ([facility]) with infection into testicle...  Work: [employer]  Hobbies: collect autographs    History reviewed. No pertinent surgical history.     History reviewed. No pertinent family history.     Past Medical History:   Diagnosis Date     Hypertension      Obesity         Social History     Tobacco Use     Smoking status: Never Smoker     Smokeless tobacco: Never Used   Substance Use Topics     Alcohol use: Not Currently     Drug use: Not Currently        Current Outpatient Medications   Medication Sig Dispense Refill     amLODIPine (NORVASC) 5 MG tablet TAKE 1 TABLET BY MOUTH EVERY DAY 90 tablet 1      "blood glucose test strips Use to check blood sugars three times daily. Strips compatible with [employer] guide. Dispense brand per patient's insurance at pharmacy discretion. 300 strip 11     generic lancets Use to check blood sugars three times daily. Dispense brand per patient's insurance at pharmacy discretion. 300 each 11     LANTUS SOLOSTAR U-100 INSULIN 100 unit/mL (3 mL) pen Inject 40 Units under the skin at bedtime. 15 mL 2     metFORMIN (GLUCOPHAGE-XR) 500 MG 24 hr tablet TAKE 2 TABLETS BY MOUTH TWICE A  tablet 0     metoprolol tartrate (LOPRESSOR) 100 MG tablet TAKE 1 TABLET BY MOUTH TWICE A  tablet 1     pen needle, diabetic 32 gauge x 5/32\" Ndle Use to inject insulin once daily 100 each 11     acetaminophen (TYLENOL) 500 MG tablet Take 2 tablets (1,000 mg total) by mouth every 6 (six) hours as needed.  0     losartan (COZAAR) 50 MG tablet Take 1 tablet (50 mg total) by mouth daily. 90 tablet 3     No current facility-administered medications for this visit.           Objective:    Vitals:    12/23/20 0654 12/23/20 0713   BP: 150/90 148/88   Pulse: 88    Temp: 97.3  F (36.3  C)    SpO2: 96%    Weight: (!) 381 lb (172.8 kg)       Body mass index is 50.96 kg/m .      General Appearance:    Alert, cooperative, no distress, appears stated age.     Head:    Normocephalic, without obvious abnormality, atraumatic   Eyes:    PERRL, conjunctiva/corneas clear, EOM's intact, fundi     benign, both eyes        Ears:    Normal TM's and external ear canals, both ears   Nose:   Nares normal, septum midline, mucosa normal, no drainage    or sinus tenderness   Throat:   Lips, mucosa, and tongue normal; teeth and gums normal   Neck:   Supple, symmetrical, trachea midline, no adenopathy;        thyroid:  No enlargement/tenderness/nodules; no carotid    bruit or JVD   Back:     Symmetric, no curvature, ROM normal, no CVA tenderness   Lungs:     Clear to auscultation bilaterally, respirations unlabored   Chest " wall:    No tenderness or deformity   Heart:    Regular rate and rhythm, S1 and S2 normal, no murmur, rub   or gallop   Abdomen:     Soft, non-tender, bowel sounds active all four quadrants,     no masses, no organomegaly.     Genitalia:    Normal male without lesion, discharge or tenderness.  No inguinal hernia noted.     Rectal:    Normal tone.  Prostate normal/symmetric, no masses or tenderness.   Extremities:   Extremities normal, atraumatic, no cyanosis or edema   Pulses:   2+ and symmetric all extremities   Skin:   Skin color, texture, turgor normal, no rashes or lesions   Lymph nodes:   Cervical, supraclavicular, and axillary nodes normal   Neurologic:   CNII-XII intact. Normal strength, sensation and reflexes       throughout         This note has been dictated using voice recognition software and as a result may contain minor grammatical errors and unintended word substitutions.

## 2021-06-14 NOTE — TELEPHONE ENCOUNTER
RN cannot approve Refill Request    RN can NOT refill this medication Protocol failed and NO refill given. Last office visit: 1/8/2020 Deep Goel MD Last Physical: Visit date not found Last MTM visit: Visit date not found Last visit same specialty: 1/21/2021 James Aguilar MD.  Next visit within 3 mo: Visit date not found  Next physical within 3 mo: Visit date not found      Freya Fletcher, [facility] Triage/Med Refill 1/22/2021    Requested Prescriptions   Pending Prescriptions Disp Refills     metFORMIN (GLUCOPHAGE-XR) 500 MG 24 hr tablet [Pharmacy Med Name: METFORMIN HCL  MG TABLET] 360 tablet 0     Sig: TAKE 2 TABLETS BY MOUTH TWICE A DAY       Metformin Refill Protocol Failed - 1/21/2021 11:16 PM        Failed - LFT or AST or ALT in last 12 months     Albumin   Date Value Ref Range Status   12/28/2019 2.7 (L) 3.5 - 5.0 g/dL Final     Bilirubin, Total   Date Value Ref Range Status   12/28/2019 0.7 0.0 - 1.0 mg/dL Final     Alkaline Phosphatase   Date Value Ref Range Status   12/28/2019 55 45 - 120 U/L Final     AST   Date Value Ref Range Status   12/28/2019 9 0 - 40 U/L Final     ALT   Date Value Ref Range Status   12/28/2019 17 0 - 45 U/L Final     Protein, Total   Date Value Ref Range Status   12/28/2019 6.4 6.0 - 8.0 g/dL Final                Passed - Blood pressure in last 12 months     BP Readings from Last 1 Encounters:   01/21/21 150/80             Passed - GFR or Serum Creatinine in last 6 months     GFR MDRD Non Af Amer   Date Value Ref Range Status   12/23/2020 >60 >60 mL/min/1.73m2 Final     GFR MDRD Af Amer   Date Value Ref Range Status   12/23/2020 >60 >60 mL/min/1.73m2 Final             Passed - Visit with PCP or prescribing provider visit in last 6 months or next 3 months     Last office visit with prescriber/PCP: Visit date not found OR same dept: 1/21/2021 James Aguilar MD OR same specialty: 1/21/2021 James Aguilar MD Last physical: Visit date not found Last MTM visit:  Visit date not found         Next appt within 3 mo: Visit date not found  Next physical within 3 mo: Visit date not found  Prescriber OR PCP: Deep Goel MD  Last diagnosis associated with med order: 1. Type 2 diabetes mellitus with hyperglycemia, without long-term current use of insulin (H)  - metFORMIN (GLUCOPHAGE-XR) 500 MG 24 hr tablet [Pharmacy Med Name: METFORMIN HCL  MG TABLET]; TAKE 2 TABLETS BY MOUTH TWICE A DAY  Dispense: 360 tablet; Refill: 0     If protocol passes may refill for 12 months if within 3 months of last provider visit (or a total of 15 months).           Passed - A1C in last 6 months     Hemoglobin A1c   Date Value Ref Range Status   12/23/2020 10.9 (H) <=5.6 % Final               Passed - Microalbumin in last year      Microalbumin, Random Urine   Date Value Ref Range Status   12/23/2020 72.68 (H) 0.00 - 1.99 mg/dL Final

## 2021-06-14 NOTE — TELEPHONE ENCOUNTER
"Pt reports \"panicky chest discomfort.\"  \"Off and on for the past two days.\"  NOT occurring now.    Occurs \"during the day\".  \"Sometimes afternoon, sometimes evening.\"  \"Locazlied to sternum area.\"  \"Lasts sometimes just a few minutes.\"  \"Half an hour was longest duration.\"    Not correlated to eating.  \"Could be work stress.\"  \"Feels like a burning sensation, not sharp.\"  \"If I'm busy I don't notice it.\"  NOT occurring now.    No breathing symptoms.  No dizziness.  No sweating.  No upper back pain or L shoulder or arm pain.  No suspicion of covid symptoms.    Urged pt to schedule same-day OV per triage disposition.  Pt states preference to see Dr James Aguilar only.  Declines to schedule with a colleague.  No open appt slots available with Dr Aguilar.  Can pt be squeezed into clinic today to see Dr Aguilar?    Bestphone # for patient -> [phone].    Thank you-    Dafne Soliz RN  [facility] Triage      Reason for Disposition    All other patients with chest pain    Additional Information    Negative: SEVERE difficulty breathing (e.g., struggling for each breath, speaks in single words)    Negative: Passed out (i.e., fainted, collapsed and was not responding)    Negative: Chest pain lasting longer than 5 minutes and ANY of the following:* Over 50 years old* Over 30 years old and at least one cardiac risk factor (i.e., high blood pressure, diabetes, high cholesterol, obesity, smoker or strong family history of heart disease)* Pain is crushing, pressure-like, or heavy * Took nitroglycerin and chest pain was not relieved* History of heart disease (i.e., angina, heart attack, bypass surgery, angioplasty, CHF)    Negative: Visible sweat on face or sweat dripping down face    Negative: Sounds like a life-threatening emergency to the triager    Negative: Followed an injury to chest    Negative: SEVERE chest pain    Negative: Pain also present in shoulder(s) or arm(s) or jaw    Negative: Difficulty breathing    " Negative: Cocaine use within last 3 days    Negative: History of prior 'blood clot' in leg or lungs (i.e., deep vein thrombosis, pulmonary embolism)    Negative: Recent illness requiring prolonged bed rest (i.e., immobilization)    Negative: Hip or leg fracture in past 2 months (e.g, or had cast on leg or ankle)    Negative: Major surgery in the past month    Negative: Recent long-distance travel with prolonged time in car, bus, plane, or train (i.e., within past 2 weeks; 6 or more hours duration)    Negative: Heart beating irregularly or very rapidly    Negative: Chest pain lasting longer than 5 minutes    Negative: Intermittent chest pain and pain has been increasing in severity or frequency    Negative: Dizziness or lightheadedness    Negative: Coughing up blood    Negative: Patient sounds very sick or weak to the triager    Negative: Fever > 100.5 F (38.1 C)    Negative: Intermittent chest pains persist > 3 days    Protocols used: CHEST PAIN-A-OH        ____________________    COVID 19 Nurse Triage Plan/Patient Instructions    Please be aware that novel coronavirus (COVID-19) may be circulating in the community. If you develop symptoms such as fever, cough, or SOB or if you have concerns about the presence of another infection including coronavirus (COVID-19), please contact your health care provider or visit [email or web address].     Disposition/Instructions    Additional COVID19 information to add for patients.   How can I protect others?  If you have symptoms (fever, cough, body aches or trouble breathing): Stay home and away from others (self-isolate) until:    At least 10 days have passed since your symptoms started, And     You ve had no fever--and no medicine that reduces fever--for 1 full day (24 hours), And      Your other symptoms have resolved (gotten better).     If you don t have symptoms, but a test showed that you have COVID-19 (you tested positive):    Stay home and away from others (self-isolate).  "Follow the tips under \"How do I self-isolate?\" below for 10 days (20 days if you have a weak immune system).    You don't need to be retested for COVID-19 before going back to school or work. As long as you're fever-free and feeling better, you can go back to school, work and other activities after waiting the 10 or 20 days.     How do I self-isolate?    Stay in your own room, even for meals. Use your own bathroom if you can.     Stay away from others in your home. No hugging, kissing or shaking hands. No visitors.    Don t go to work, school or anywhere else.     Clean  high touch  surfaces often (doorknobs, counters, handles, etc.). Use a household cleaning spray or wipes. You ll find a full list on the EPA website:  www.epa.gov/pesticide-registration/list-n-disinfectants-use-against-sars-cov-2.    Cover your mouth and nose with a mask, tissue or washcloth to avoid spreading germs.    Wash your hands and face often. Use soap and water.    Caregivers in these groups are at risk for severe illness due to COVID-19:  o People 65 years and older  o People who live in a nursing home or long-term care facility  o People with chronic disease (lung, heart, cancer, diabetes, kidney, liver, immunologic)  o People who have a weakened immune system, including those who:  - Are in cancer treatment  - Take medicine that weakens the immune system, such as corticosteroids  - Had a bone marrow or organ transplant  - Have an immune deficiency  - Have poorly controlled HIV or AIDS  - Are obese (body mass index of 40 or higher)  - Smoke regularly    Caregivers should wear gloves while washing dishes, handling laundry and cleaning bedrooms and bathrooms.    Use caution when washing and drying laundry: Don t shake dirty laundry, and use the warmest water setting that you can.    For more tips, go to www.cdc.gov/coronavirus/2019-ncov/downloads/10Things.pdf.    How can I take care of myself?  1. Get lots of rest. Drink extra fluids " (unless a doctor has told you not to).     2. Take Tylenol (acetaminophen) for fever or pain. If you have liver or kidney problems, ask your family doctor if it s okay to take Tylenol.     Adults can take either:     650 mg (two 325 mg pills) every 4 to 6 hours, or     1,000 mg (two 500 mg pills) every 8 hours as needed.     Note: Don t take more than 3,000 mg in one day.   Acetaminophen is found in many medicines (both prescribed and over-the-counter medicines). Read all labels to be sure you don t take too much.     For children, check the Tylenol bottle for the right dose. The dose is based on the child s age or weight.    3. If you have other health problems (like cancer, heart failure, an organ transplant or severe kidney disease): Call your specialty clinic if you don t feel better in the next 2 days.    4. Know when to call 911: Emergency warning signs include:    Trouble breathing or shortness of breath    Pain or pressure in the chest that doesn t go away    Feeling confused like you haven t felt before, or not being able to wake up    Bluish-colored lips or face    What are the symptoms of COVID-19?     The most common symptoms are cough, fever and trouble breathing.     Less common symptoms include body aches, chills, diarrhea (loose, watery poops), fatigue (feeling very tired), headache, runny nose, sore throat and loss of smell.    COVID-19 can cause severe coughing (bronchitis) and lung infection (pneumonia).    How does it spread?     The virus may spread when a person coughs or sneezes into the air. The virus can travel about 6 feet this way, and it can live on surfaces.      Common  (household disinfectants) will kill the virus.    Who is at risk?  Anyone can catch COVID-19 if they re around someone who has the virus.    How can others protect themselves?     Stay away from people who have COVID-19 (or symptoms of COVID-19).    Wash hands often with soap and water. Or, use hand   with at least 60% alcohol.    Avoid touching the eyes, nose or mouth.     Wear a face mask when you go out in public, when sick or when caring for a sick person.    Where can I get more information?    [facility]: About COVID-19: [email or web address]    CDC: What to Do If You re Sick: www.cdc.gov/coronavirus/2019-ncov/about/steps-when-sick.html    CDC: Ending Home Isolation: www.cdc.gov/coronavirus/2019-ncov/hcp/disposition-in-home-patients.html     CDC: Caring for Someone: www.cdc.gov/coronavirus/2019-ncov/if-you-are-sick/care-for-someone.html     [facility]: Interim Guidance for Hospital Discharge to Home: www.[facility].[place]./diseases/coronavirus/hcp/hospdischarge.pdf    [facility] clinical trials (COVID-19 research studies): clinicalaffairs.[facility]-clinical-trials     Below are the COVID-19 hotlines at the [place] Department of Health ([facility]). Interpreters are available.   o For health questions: Call 390-147-9132 or 1-278.787.4323 (7 a.m. to 7 p.m.)  o For questions about schools and childcare: Call 812-680-9703 or 1-954.141.3241 (7 a.m. to 7 p.m.)              Thank you for taking steps to prevent the spread of this virus.  o Limit your contact with others.  o Wear a simple mask to cover your cough.  o Wash your hands well and often.    Resources    [facility]: About COVID-19: [email or web address]    CDC: What to Do If You're Sick: www.cdc.gov/coronavirus/2019-ncov/about/steps-when-sick.html    CDC: Ending Home Isolation: www.cdc.gov/coronavirus/2019-ncov/hcp/disposition-in-home-patients.html     CDC: Caring for Someone: www.cdc.gov/coronavirus/2019-ncov/if-you-are-sick/care-for-someone.html     [facility]: Interim Guidance for Hospital Discharge to Home: www.health.[facility].[place]/diseases/coronavirus/hcp/hospdischarge.pdf    [facility] clinical trials (COVID-19 research studies): [email or web address].[facility]-clinical-trials     Below are the COVID-19  hotlines at the [place] Department of Health ([facility]). Interpreters are available.   o For health questions: Call 261-933-4974 or 1-259.190.7149 (7 a.m. to 7 p.m.)  o For questions about schools and childcare: Call 793-549-1534 or 1-556.985.3873 (7 a.m. to 7 p.m.)

## 2021-06-14 NOTE — PROGRESS NOTES
Assessment/Plan:    1. Acute chest pain  Atypical chest pain question reflux etiology with burning mid chest.  Omeprazole 20 mg daily x14 days initially.  Electrocardiogram normal.  Will complete GXT at earliest convenience as well.  Remains on metoprolol tartrate 100 mg twice daily with appropriate rate control.  - Electrocardiogram Perform and Read  - Stress Test Graded; Future    2. Type 2 diabetes mellitus with hyperglycemia, without long-term current use of insulin (H)  Type 2 diabetes.  Suboptimal control.  A1c improved from 14.7% down to 10.9% December 23, 2020 and is working with Kandice diabetes educator with glutamic acid decarboxylase and C-peptide levels pending from earlier today.  Fasting blood sugar this morning 187.  Continues Lantus which he will increase from 36 units up to 40 units while continuing Metformin  mg using 2 tablets twice daily.  Has diabetes recheck with me March 23, 2021 and will see Kandice in early February.    3. Essential hypertension  Hypertension suboptimal control blood pressure 152/84.  Will consider further increase losartan from 50 mg up to 100 mg daily however patient elects to continue current dosing along with amlodipine 5 mg daily metoprolol tartrate 100 mg twice daily.    4. Morbid obesity (H)  Dietary and exercise modification for weight goal remaining less than 300 pounds initially.          Subjective:    KunGIOVANI Leigh is seen today for atypical chest pain.  More mid chest.  Happened perhaps a couple weeks ago around December 22.  Took some Rolaids which seemed to help by the time he woke up in the morning.  Not exertionally related.  More of a burning sensation.  Happened yesterday than today.  Was slightly anxious associated with this.  No palpitations.  No radicular symptoms of discomfort from epigastric or lower sternum centrally.  Continues medications including losartan 50 mg daily which was added December 23 we will continue metoprolol tartrate 100 mg  twice daily and amlodipine 5 mg daily.  Using Lantus titrated from 24 units up to 36 units in blood sugars in the morning now closer to 130.  Metformin extended release 500 mg using 2 tablets twice daily.  Met with Kandice diabetes educator.  Some fasting labs are drawn this morning.  No fevers or chills.  Comprehensive review of systems as above otherwise all negative.    Single  No children  Tobacco: none  EtOH: never (by choice) - grandparents with EtOH issue  Mom - type 2 diabetes  Dad -   1 younger sis -   Surgeries: none  Hospitalizations: right groin drainage at [facility] after admission ([facility]) with infection into testicle...  Work: [employer]  Hobbies: collect autographs    No past surgical history on file.     No family history on file.     Past Medical History:   Diagnosis Date     Hypertension      Obesity         Social History     Tobacco Use     Smoking status: Never Smoker     Smokeless tobacco: Never Used   Substance Use Topics     Alcohol use: Not Currently     Drug use: Not Currently        Current Outpatient Medications   Medication Sig Dispense Refill     acetaminophen (TYLENOL) 500 MG tablet Take 2 tablets (1,000 mg total) by mouth every 6 (six) hours as needed.  0     amLODIPine (NORVASC) 5 MG tablet TAKE 1 TABLET BY MOUTH EVERY DAY 90 tablet 1     blood glucose test strips Use to check blood sugars three times daily. Strips compatible with [employer] guide. Dispense brand per patient's insurance at pharmacy discretion. 300 strip 11     generic lancets Use to check blood sugars three times daily. Dispense brand per patient's insurance at pharmacy discretion. 300 each 11     LANTUS SOLOSTAR U-100 INSULIN 100 unit/mL (3 mL) pen Inject 40 Units under the skin at bedtime. 15 mL 2     losartan (COZAAR) 50 MG tablet Take 1 tablet (50 mg total) by mouth daily. (Patient taking differently: Take 35 mg by mouth daily. ) 90 tablet 3     metFORMIN (GLUCOPHAGE-XR) 500 MG 24 hr tablet TAKE 2 TABLETS BY  "MOUTH TWICE A  tablet 0     metoprolol tartrate (LOPRESSOR) 100 MG tablet TAKE 1 TABLET BY MOUTH TWICE A  tablet 1     pen needle, diabetic 32 gauge x 5/32\" Ndle Use to inject insulin once daily 100 each 11     No current facility-administered medications for this visit.           Objective:    Vitals:    01/21/21 1527   BP: 150/80   Pulse: 99   Temp: 98.5  F (36.9  C)   SpO2: 98%   Weight: (!) 386 lb (175.1 kg)      Body mass index is 51.63 kg/m .    Alert.  No apparent distress.  Chest clear.  Cardiac exam regular.  Blood pressure on recheck 152/84.  Extremities warm and dry.      This note has been dictated using voice recognition software and as a result may contain minor grammatical errors and unintended word substitutions.   "

## 2021-06-15 NOTE — PROGRESS NOTES
Assessment/Plan:    1. Acute chest pain  Atypical chest pain likely component of anxiety with psychosomatic presentation correlating with friends health issue.  Recent EKG and GXT normal.  Patient to utilize omeprazole 20 mg daily and will reassess at scheduled follow-up for diabetes visit 2021.    2. Essential hypertension  Suboptimal blood pressure control with blood pressure 146/86 on recheck.  Increase losartan from 50 mg to 100 mg daily while continuing with Toprol tartrate 100 mg twice daily and amlodipine 5 mg daily.  - losartan (COZAAR) 100 MG tablet; Take 1 tablet (100 mg total) by mouth daily.  Dispense: 90 tablet; Refill: 3    3. Microalbuminuria  Microalbuminuria.  Benefits of increase angiotensin receptor blocker dosing anticipated.  Continue to follow closely with recent microalbumin ratio = 286 2020.    4. Type 2 diabetes mellitus with hyperglycemia, without long-term current use of insulin (H)  A1c improved from 14.7% down to 10.9% 2020 and continues Lantus 40 units daily plus Metformin extended release 500 mg using 2 tablets twice daily.  Continues to work with Kandice diabetes educator.  Has diabetes follow-up 2021 as well as subsequent follow-up with diabetes educator a week later.  Recent C-peptide testing normal.    5. Class 3 severe obesity due to excess calories with serious comorbidity and body mass index (BMI) of 50.0 to 59.9 in adult (H)  Ongoing attempts at therapeutic lifestyle changes for needed weight loss.          Subjective:    Lm Leigh is seen today for follow-up assessment.  Atypical chest pain.  Had been seen 2021.  EKG normal at that time.  Atypical chest discomfort that had been intermittent in nature.  Told to use omeprazole 20 mg daily which she did for 2 weeks without obvious change however a week or so later was feeling well up until the time he found out her friend has  from significant myocardial infarction  apparently.  Did have outpatient GXT February 2 which was negative for inducible ischemia etc.  Underlying hypertension treated with metoprolol tartrate 100 mg twice daily, amlodipine 5 mg daily losartan 50 mg daily.  Had increase Lantus insulin from 36 units up to 40 units daily and continues Metformin extended release 500 mg using 2 tablets twice daily.  History of microalbuminuria present.  Patient does admit to increased anxiety and stress.  Comprehensive review of systems as above otherwise all negative.    Single  No children  Tobacco: none  EtOH: never (by choice) - grandparents with EtOH issue  Mom - type 2 diabetes  Dad -   1 younger sis -   Surgeries: none  Hospitalizations: right groin drainage at [facility] after admission ([facility]) with infection into testicle...  Work: [employer]  Hobbies: collect autographs    History reviewed. No pertinent surgical history.     History reviewed. No pertinent family history.     Past Medical History:   Diagnosis Date     Hypertension      Obesity         Social History     Tobacco Use     Smoking status: Never Smoker     Smokeless tobacco: Never Used   Substance Use Topics     Alcohol use: Not Currently     Drug use: Not Currently        Current Outpatient Medications   Medication Sig Dispense Refill     amLODIPine (NORVASC) 5 MG tablet TAKE 1 TABLET BY MOUTH EVERY DAY 90 tablet 1     aspirin 81 MG EC tablet Take 81 mg by mouth daily.       blood glucose test strips Use to check blood sugars three times daily. Strips compatible with [employer] guide. Dispense brand per patient's insurance at pharmacy discretion. 300 strip 11     generic lancets Use to check blood sugars three times daily. Dispense brand per patient's insurance at pharmacy discretion. 300 each 11     LANTUS SOLOSTAR U-100 INSULIN 100 unit/mL (3 mL) pen Inject 40 Units under the skin at bedtime. 15 mL 2     losartan (COZAAR) 100 MG tablet Take 1 tablet (100 mg total) by mouth daily. 90 tablet 3      "metFORMIN (GLUCOPHAGE-XR) 500 MG 24 hr tablet TAKE 2 TABLETS BY MOUTH TWICE A  tablet 0     metoprolol tartrate (LOPRESSOR) 100 MG tablet TAKE 1 TABLET BY MOUTH TWICE A  tablet 1     pen needle, diabetic 32 gauge x 5/32\" Ndle Use to inject insulin once daily 100 each 11     No current facility-administered medications for this visit.           Objective:    Vitals:    02/26/21 1213 02/26/21 1248   BP: 130/70 146/86   Pulse: 85    Temp: 98.7  F (37.1  C)    SpO2: 97%    Weight: (!) 381 lb (172.8 kg)       Body mass index is 50.96 kg/m .    Alert.  No apparent distress.  Chest clear.  Cardiac exam regular.  Blood pressure 146/86 on recheck.      GXT 2/2/21 Conclusion:        The stress electrocardiogram is negative for inducible ischemic EKG changes.     There is no prior study for comparison.     The patient's exercise capacity is mildly impaired.         EKG 1/21/21:  Sinus rhythm   Normal ECG   No previous ECGs available   Confirmed by DAVID LAKHANI MD LOC:[facility] ([id number]) on 1/21/2021 5:11:52 PM         This note has been dictated using voice recognition software and as a result may contain minor grammatical errors and unintended word substitutions.   "

## 2021-06-16 NOTE — PROGRESS NOTES
Assessment/Plan:    1. Type 2 diabetes mellitus with hyperglycemia, without long-term current use of insulin (H)  Type 2 diabetes improved control.  A1c originally 14.7% down to 10.9% December 23, 2020.  Continues Lantus insulin 40 units daily plus Metformin extended release 500 mg using 2 tablets twice daily.  Tolerating well.  A.m. blood sugars between 130 and 160-170.  Occasional in the 90s without hypoglycemic symptoms.  History of microalbuminuria.  Continues benefits of losartan for renal protection.  Annual diabetic eye exams to continue.  Will reassess at follow-up in 4 months.  Patient declines further dose adjustment of Lantus at this time.  Working with Kandice diabetes educator.  - Glycosylated Hemoglobin A1c  - Basic Metabolic Panel    2. Essential hypertension  Hypertension suboptimal control blood pressure 152/86 and will increase amlodipine from 5 mg up to 10 mg daily while continuing metoprolol 100 mg twice daily and losartan 100 mg daily.  Reassess blood pressure at follow-up in 4 months.  - amLODIPine (NORVASC) 10 MG tablet; Take 1 tablet (10 mg total) by mouth daily.  Dispense: 90 tablet; Refill: 3  - metoprolol tartrate (LOPRESSOR) 100 MG tablet; Take 1 tablet (100 mg total) by mouth 2 (two) times a day.  Dispense: 180 tablet; Refill: 3  - Basic Metabolic Panel    3. Microalbuminuria  Microalbuminuria noted.  Continues benefits of losartan for renal protection.  Reassess at follow-up visit in 4 months.    4. Acute chest pain  Recent assessment February 26, 2021 for atypical chest pain.  EKG and GXT normal.  Chest pain has basically resolved while using omeprazole 20 mg daily.  Will complete 30-day course then okay to discontinue.  May resume if recurrent symptoms noted.    5. Class 3 severe obesity due to excess calories with serious comorbidity and body mass index (BMI) of 50.0 to 59.9 in adult (H)  Dietary and exercise modifications for weight goal less than 350 pounds initially, less than 300  pounds ideally.        Subjective:    Lm Leigh is seen today for follow-up assessment.  Type 2 diabetes.  Working with Kandice diabetes educator.  Now on Lantus insulin 40 units daily plus continuing Metformin extended release 500 mg using 2 tablets twice daily.  Blood sugars typically 130 up to 160 or 170 in the morning.  Occasionally in the 90s without hypoglycemic symptoms.  Daily aspirin.  Prior atypical chest pain feeling better.  Was worried because a close friend of his  in his 40s from a described heart attack.  Patient has been having some trouble sleeping with lots of things on his mind otherwise not chronic concern.  Microalbuminuria history.  Using metoprolol tartrate 100 mg twice daily, amlodipine 5 mg daily and losartan 100 mg daily for hypertension.  No significant peripheral edema issues.  Comprehensive review of systems as above otherwise all negative.    Single   No children   Tobacco:  none   EtOH:  never (by choice) - grandparents with EtOH issue   Mom - type 2 diabetes   Dad -   1 younger sis -   Surgeries:  none   Hospitalizations:  right groin drainage at [facility] after admission ([facility]) with infection into testicle...   Work:  [employer]   Hobbies:  collect autographs    History reviewed. No pertinent surgical history.     History reviewed. No pertinent family history.     Past Medical History:   Diagnosis Date     Hypertension      Obesity         Social History     Tobacco Use     Smoking status: Never Smoker     Smokeless tobacco: Never Used   Substance Use Topics     Alcohol use: Not Currently     Drug use: Not Currently        Current Outpatient Medications   Medication Sig Dispense Refill     amLODIPine (NORVASC) 10 MG tablet Take 1 tablet (10 mg total) by mouth daily. 90 tablet 3     aspirin 81 MG EC tablet Take 81 mg by mouth daily.       blood glucose test strips Use to check blood sugars three times daily. Strips compatible with [employer] guide. Dispense brand per  "patient's insurance at pharmacy discretion. 300 strip 11     generic lancets Use to check blood sugars three times daily. Dispense brand per patient's insurance at pharmacy discretion. 300 each 11     LANTUS SOLOSTAR U-100 INSULIN 100 unit/mL (3 mL) pen Inject 40 Units under the skin at bedtime. 15 mL 2     losartan (COZAAR) 100 MG tablet Take 1 tablet (100 mg total) by mouth daily. 90 tablet 3     metFORMIN (GLUCOPHAGE-XR) 500 MG 24 hr tablet TAKE 2 TABLETS BY MOUTH TWICE A  tablet 0     metoprolol tartrate (LOPRESSOR) 100 MG tablet Take 1 tablet (100 mg total) by mouth 2 (two) times a day. 180 tablet 3     pen needle, diabetic 32 gauge x 5/32\" Ndle Use to inject insulin once daily 100 each 11     No current facility-administered medications for this visit.           Objective:    Vitals:    03/23/21 0712 03/23/21 0807   BP: 138/90 152/86   Pulse: 85    Temp: 97.5  F (36.4  C)    SpO2: (!) 85%    Weight: (!) 383 lb (173.7 kg)       Body mass index is 51.23 kg/m .    Alert.  No apparent distress.  Blood pressure on recheck 152/86.  Chest clear.  Cardiac exam regular.      This note has been dictated using voice recognition software and as a result may contain minor grammatical errors and unintended word substitutions.   "

## 2021-06-17 NOTE — TELEPHONE ENCOUNTER
Lm calls and says that he will soon need a refill of his 32 gauge pen needles. Pt. Says that he has enough left for about a week but is just letting the clinic know. Pt. Says that he sees Dr. James Aguilar at the [facility]. RN then left this message for pt's , as requested. Kathleen Payton RN FNA  COVID 19 Nurse Triage Plan/Patient Instructions    Please be aware that novel coronavirus (COVID-19) may be circulating in the community. If you develop symptoms such as fever, cough, or SOB or if you have concerns about the presence of another infection including coronavirus (COVID-19), please contact your health care provider or visit  [email or web address].    Disposition/Instructions    Virtual Visit with provider recommended. Reference Visit Selection Guide.    Thank you for taking steps to prevent the spread of this virus.  o Limit your contact with others.  o Wear a simple mask to cover your cough.  o Wash your hands well and often.    Resources    [facility]: About COVID-19: [email or web address]    CDC: What to Do If You're Sick: www.cdc.gov/coronavirus/2019-ncov/about/steps-when-sick.html    CDC: Ending Home Isolation: www.cdc.gov/coronavirus/2019-ncov/hcp/disposition-in-home-patients.html     CDC: Caring for Someone: www.cdc.gov/coronavirus/2019-ncov/if-you-are-sick/care-for-someone.html     [facility]: Interim Guidance for Hospital Discharge to Home: www.health.[facility].[place]/diseases/coronavirus/hcp/hospdischarge.pdf    [facility] clinical trials (COVID-19 research studies): [email or web address]-trials     Below are the COVID-19 hotlines at the [place] Department of [facility] ([facility]). Interpreters are available.   o For health questions: Call 051-243-3533 or 1-813.587.8614 (7 a.m. to 7 p.m.)  o For questions about schools and childcare: Call 190-989-2185 or 1-595.829.3362 (7 a.m. to 7 p.m.)       Reason for Disposition    [1] Caller requesting a  "NON-URGENT new prescription or refill AND [2] triager unable to refill per unit policy    Additional Information    Negative: Drug overdose and triager unable to answer question    Negative: Caller requesting information unrelated to medicine    Negative: Caller requesting a prescription for Strep throat and has a positive culture result    Negative: Rash while taking a medication or within 3 days of stopping it    Negative: Immunization reaction suspected    Negative: [1] Asthma and [2] having symptoms of asthma (cough, wheezing, etc.)    Negative: [1] Influenza symptoms AND [2] anti-viral med prescription request, such as Tamiflu    Negative: [1] Symptom of illness (e.g., headache, abdominal pain, earache, vomiting) AND [2] more than mild    Negative: MORE THAN A DOUBLE DOSE of a prescription or over-the-counter (OTC) drug    Negative: [1] DOUBLE DOSE (an extra dose or lesser amount) of over-the-counter (OTC) drug AND [2] any symptoms (e.g., dizziness, nausea, pain, sleepiness)    Negative: [1] DOUBLE DOSE (an extra dose or lesser amount) of prescription drug AND [2] any symptoms (e.g., dizziness, nausea, pain, sleepiness)    Negative: Took another person's prescription drug    Negative: [1] DOUBLE DOSE (an extra dose or lesser amount) of prescription drug AND [2] NO symptoms (Exception: a double dose of antibiotics)    Negative: Diabetes drug error or overdose (e.g., took wrong type of insulin or took extra dose)    Negative: [1] Request for URGENT new prescription or refill of \"essential\" medication (i.e., likelihood of harm to patient if not taken) AND [2] triager unable to fill per unit policy    Negative: [1] Prescription not at pharmacy AND [2] was prescribed by PCP recently    Negative: [1] Pharmacy calling with prescription questions AND [2] triager unable to answer question    Negative: [1] Caller has URGENT medication question about med that PCP or specialist prescribed AND [2] triager unable to answer " question    Negative: [1] Caller has NON-URGENT medication question about med that PCP prescribed AND [2] triager unable to answer question    Protocols used: MEDICATION QUESTION CALL-A-AH

## 2021-06-18 NOTE — PATIENT INSTRUCTIONS - HE
Patient Instructions by Juliet Gómez CNP at 3/9/2020  5:50 PM     Author: Juliet Gómez CNP Service: -- Author Type: Nurse Practitioner    Filed: 3/9/2020  7:27 PM Encounter Date: 3/9/2020 Status: Addendum    : Juliet Gómez CNP (Nurse Practitioner)    Related Notes: Original Note by Juliet Gómez CNP (Nurse Practitioner) filed at 3/9/2020  7:16 PM       See handout on safe earwax removal.  Avoid use of Q-tips.  Use the product Debrox available at the pharmacy or mineral oil to help loosen earwax per package instructions.  Do not worry about it for a few weeks.    Tylenol or ibuprofen as needed for ear pain.    Fill acetic acid prescription if you are right ear is /painful after 1-2 additional days.    Patient Education     Earwax Removal    The ear canal makes earwax from the canals lining. The ears make wax to lubricate and protect the ear canal. The ear canal is the tube that connects the middle ear to the outside of the ear. The wax protects the ear from bacteria, infection, and damage from water or trauma.  The wax that forms in the canal naturally moves toward the outside of the ear and falls out. In some cases, the ear may make too much wax. If the wax causes problems or keeps the healthcare provider from seeing into the ear, the extra wax may be removed.  Too much wax can affect your hearing. It can cause itching. In rare cases, it can be painful. Earwax should not be removed unless it is causing a problem. You should not stick objects into your ear to remove wax unless told to do so by your healthcare provider.  Healthcare providers can remove earwax safely. It is important to stay still during the procedure to avoid damage to the ear canal. But removing earwax generally doesnt hurt. You will not usually need anesthesia or pain medicine when the provider removes the earwax.  A number of conditions lead to earwax buildup. These include some skin problems, a narrow ear  canal, or ears that make too much earwax. Using cotton swabs in the canal pushes earwax deeper into the ear and contributes to the buildup of earwax.  Home care    The healthcare provider may recommend mineral oil or an over-the-counter eardrop to use at home to soften the earwax. Use these products only if the provider recommends them. Carefully follow the instructions given.    Dont use mineral oil or OTC eardrops if you might have an ear infection or a ruptured eardrum. Tell your healthcare provider right away if you have diabetes or an immune disorder.    Dont use cotton swabs in your ears. Cotton swabs may push wax deeper into the ear canal or damage the eardrum. Use cotton gauze or a wet washcloth  to gently remove wax on the outside of the ear and around the opening to the ear canal.    Don't use any probing device or object such as cotton-tipped swabs or andrew pins to clean the inside of your ears.    Dont use ear candles to clean your ears. Candling can be dangerous. It can burn the ear canal. It can also make the condition worse instead of better.    Dont use cold water to rinse the ear. This will make you dizzy. If your provider tells you to rinse your ear, use only warm water or follow his or her instructions.    Check the ear for signs of infection or irritation listed below under When to seek medical advice.  Steps for using eardrops  1. Warm the medicine bottle by rubbing it between your hands for a few minutes.  2. Lie down on your side, with the affected ear up.  3. Place the recommended number of drops in the ear. Wet a cotton ball with the medicine. Gently put the cotton ball into the ear opening.  Follow-up care  Follow up with your healthcare provider, or as directed.  When to seek medical advice  Call the provider right away if you have:    Ear pain that gets worse    Fever of 100.4F F (38 C) or higher, or as directed by your healthcare provider    Worsening wax buildup    Severe pain,  dizziness, or nausea    Bleeding from the ear    Hearing problems    Signs of irritation from the eardrops, such as burning, stinging, or swelling and tenderness    Foul-smelling fluid draining from the ear    Swelling, redness, or tenderness of the outer ear    Headache, neck pain, or stiff neck  Date Last Reviewed: 6/1/2017 2000-2017 [employer]. [address] [place] [address]. All rights reserved. This information is not intended as a substitute for professional medical care. Always follow your healthcare professional's instructions.

## 2021-06-18 NOTE — PATIENT INSTRUCTIONS - HE
Patient Instructions by Jaquelin Valera PA-C at 12/4/2020  6:20 PM     Author: Jaquelin Valera PA-C Service: -- Author Type: Physician Assistant    Filed: 12/4/2020  6:39 PM Encounter Date: 12/4/2020 Status: Addendum    : Jaquelin Valera PA-C (Physician Assistant)    Related Notes: Original Note by Jaquelin Valera PA-C (Physician Assistant) filed at 12/4/2020  6:36 PM       You were seen today for a skin infection known as cellulitis.    Symptom management:  - Take antibiotics as prescribed for the full course, even if symptoms improve  - Keep the affected area clean with gentle water and soap  - If possible, keep the area elevated above the chest to help with swelling  - May use tylenol or ibuprofen for pain or discomfort as needed    Reasons to return immediately for re-evaluation:  - Difficulty or pain with moving joints around the affected area  - Develop discharge or pus-like drainage  - Fever of 100.4 or higher  - Worsening pain/swelling or spreading redness outside the marked area after 24 hours of antibiotics  - Vomiting    Otherwise, follow-up as directed or as needed with your primary care provider      Patient to follow up with Primary Care provider regarding elevated blood pressure.

## 2021-06-21 NOTE — LETTER
Letter by James Aguilar MD at      Author: James Aguilar MD Service: -- Author Type: --    Filed:  Encounter Date: 12/28/2020 Status: (Other)         Lm Leigh  [address] [place] [address]             December 28, 2020         Dear Mr. Leigh,    Below are the results from your recent visit:    Resulted Orders   Lipid Profile   Result Value Ref Range    Triglycerides 100 <=149 mg/dL    Cholesterol 179 <=199 mg/dL    LDL Calculated 108 <=129 mg/dL    HDL Cholesterol 51 >=40 mg/dL    Patient Fasting > 8hrs? Yes    Basic Metabolic Panel   Result Value Ref Range    Sodium 136 136 - 145 mmol/L    Potassium 4.2 3.5 - 5.0 mmol/L    Chloride 103 98 - 107 mmol/L    CO2 18 (L) 22 - 31 mmol/L    Anion Gap, Calculation 15 5 - 18 mmol/L    Glucose 171 (H) 70 - 125 mg/dL    Calcium 9.4 8.5 - 10.5 mg/dL    BUN 17 8 - 22 mg/dL    Creatinine 1.01 0.70 - 1.30 mg/dL    GFR MDRD Af Amer >60 >60 mL/min/1.73m2    GFR MDRD Non Af Amer >60 >60 mL/min/1.73m2    Narrative    Fasting Glucose reference range is 70-99 mg/dL per  American Diabetes Association (ADA) guidelines.   Hepatitis C Antibody (Anti-HCV)   Result Value Ref Range    Hepatitis C Ab Negative Negative   Microalbumin, Random Urine   Result Value Ref Range    Microalbumin, Random Urine 72.68 (H) 0.00 - 1.99 mg/dL    Creatinine, Urine 253.5 mg/dL    Microalbumin/Creatinine Ratio Random Urine 286.7 (H) <=19.9 mg/g    Narrative    Microalbumin, Random Urine  <2.0 mg/dL . . . . . . . . Normal  3.0-30.0 mg/dL . . . . . . Microalbuminuria  >30.0 mg/dL . . . . . .  . Clinical Proteinuria    Microalbumin/Creatinine Ratio, Random Urine  <20 mg/g . . . . .. . . . Normal   mg/g . . . . . . . Microalbuminuria  >300 mg/g . . . . . . . . Clinical Proteinuria           Your cholesterol results were near goal.  Due to diagnosis of diabetes, we will continue to discuss recommendation for cholesterol-lowering medication in order to decrease future risk for  heart attack, stroke, etc.  Ensure regular exercise, healthy diet, and weight loss modifications in order to further improve.  Weight goal < 300 pounds initially.  We will plan to recheck your labs while fasting in the next 3-6 months to ensure desired improvement.       Your kidney function tests (i.e. Basic Metabolic Panel) were otherwise fine.    Your hepatitis C screen based on age criteria was normal.     Your urine screen was abnormal.  Excess protein escapes through your kidneys into your urine.  Continue your current medication (losartan) as discussed which helps to protect your kidneys.  We will plan on rechecking this test in the next 6 months to ensure that it remains stable or further improves ideally.     Please call with questions or contact us using [facility].    Sincerely,        Electronically signed by James Aguilar MD

## 2021-06-28 NOTE — PROGRESS NOTES
"Progress Notes by Juliet Gómez CNP at 3/9/2020  5:50 PM     Author: Juliet Gómez CNP Service: -- Author Type: Nurse Practitioner    Filed: 3/9/2020  7:26 PM Encounter Date: 3/9/2020 Status: Signed    : Juliet Gómez CNP (Nurse Practitioner)       Chief Complaint   Patient presents with   ? Ear Pain     x over the weekend worse- but started last week       ASSESSMENT & PLAN:   Diagnoses and all orders for this visit:    Bilateral impacted cerumen    Infective otitis externa, right  -     acetic acid-hydrocortisone (VOSOL-HC) otic solution; Administer 5 drops to the right ear 3 (three) times a day.  Dispense: 10 mL; Refill: 0        MDM:  Paper prescription provided for acetic acid hydrocortisone for otitis externa as he does have tragal tenderness and is a compulsive Q-tip user per his own description.  Patient told that he should wait 1 to 2 days to see if ear pain does resolve following cerumen removal and then fill paper prescription if it is not better.    Supportive care discussed.  See discharge instructions below for specific recommendations given.    At the end of the encounter, I discussed results, diagnosis, medications. Discussed red flags for immediate return to clinic/ER, as well as indications for follow up if no improvement. Patient and/or caregiver understood and agreed to plan. Patient was stable for discharge.    SUBJECTIVE    HPI:  HPI  Lm Leigh presents to the walk-in clinic with   Chief Complaint   Patient presents with   ? Ear Pain     x over the weekend worse- but started last week     Left ear pain worsening over the last 3 to 4 days.    Denies recent fever, cough, congestion.  Gets frequent ear irrigations for cerumen removal.  Does use Q-tips frequently, describes it as \"impulsive.\"      Is a type II diabetic on insulin.    See ROS for additional symptoms and/or pertinent negatives.       History obtained from the patient.    Past Medical History:   Diagnosis Date "   ? Hypertension    ? Obesity        Active Ambulatory (Non-Hospital) Problems    Diagnosis   ? Cellulitis of scrotum   ? Morbid obesity (H)   ? Essential hypertension   ? Type 2 diabetes mellitus with hyperglycemia, without long-term current use of insulin (H)   ? Sprain, hamstring       No family history on file.    Social History     Tobacco Use   ? Smoking status: Never Smoker   ? Smokeless tobacco: Never Used   Substance Use Topics   ? Alcohol use: Not Currently       Review of Systems   All other systems reviewed and are negative.      OBJECTIVE    Vitals:    03/09/20 1814   BP: 151/85   Patient Site: Right Arm   Patient Position: Sitting   Cuff Size: Adult Large   Pulse: 80   Resp: 17   Temp: 97.9  F (36.6  C)   TempSrc: Oral   SpO2: 99%   Weight: (!) 362 lb 12.8 oz (164.6 kg)       Physical Exam  Constitutional:       General: He is not in acute distress.     Appearance: He is well-developed.   HENT:      Right Ear: External ear normal. Tenderness (tragal ) present. There is impacted cerumen.      Left Ear: External ear normal. There is impacted cerumen.   Eyes:      General:         Right eye: No discharge.         Left eye: No discharge.      Conjunctiva/sclera: Conjunctivae normal.   Pulmonary:      Effort: Pulmonary effort is normal.   Musculoskeletal: Normal range of motion.   Lymphadenopathy:      Cervical: No cervical adenopathy.   Skin:     General: Skin is warm and dry.      Capillary Refill: Capillary refill takes less than 2 seconds.   Neurological:      Mental Status: He is alert and oriented to person, place, and time.   Psychiatric:         Behavior: Behavior normal.         Thought Content: Thought content normal.         Judgment: Judgment normal.         Labs:  No results found for this or any previous visit (from the past 240 hour(s)).      Radiology:    No results found.    PATIENT INSTRUCTIONS:   Patient Instructions   See handout on safe earwax removal.  Avoid use of Q-tips.  Use the  product Debrox available at the pharmacy or mineral oil to help loosen earwax per package instructions.      Patient Education     Earwax Removal    The ear canal makes earwax from the canals lining. The ears make wax to lubricate and protect the ear canal. The ear canal is the tube that connects the middle ear to the outside of the ear. The wax protects the ear from bacteria, infection, and damage from water or trauma.  The wax that forms in the canal naturally moves toward the outside of the ear and falls out. In some cases, the ear may make too much wax. If the wax causes problems or keeps the healthcare provider from seeing into the ear, the extra wax may be removed.  Too much wax can affect your hearing. It can cause itching. In rare cases, it can be painful. Earwax should not be removed unless it is causing a problem. You should not stick objects into your ear to remove wax unless told to do so by your healthcare provider.  Healthcare providers can remove earwax safely. It is important to stay still during the procedure to avoid damage to the ear canal. But removing earwax generally doesnt hurt. You will not usually need anesthesia or pain medicine when the provider removes the earwax.  A number of conditions lead to earwax buildup. These include some skin problems, a narrow ear canal, or ears that make too much earwax. Using cotton swabs in the canal pushes earwax deeper into the ear and contributes to the buildup of earwax.  Home care    The healthcare provider may recommend mineral oil or an over-the-counter eardrop to use at home to soften the earwax. Use these products only if the provider recommends them. Carefully follow the instructions given.    Dont use mineral oil or OTC eardrops if you might have an ear infection or a ruptured eardrum. Tell your healthcare provider right away if you have diabetes or an immune disorder.    Dont use cotton swabs in your ears. Cotton swabs may push wax deeper into the  ear canal or damage the eardrum. Use cotton gauze or a wet washcloth  to gently remove wax on the outside of the ear and around the opening to the ear canal.    Don't use any probing device or object such as cotton-tipped swabs or andrew pins to clean the inside of your ears.    Dont use ear candles to clean your ears. Candling can be dangerous. It can burn the ear canal. It can also make the condition worse instead of better.    Dont use cold water to rinse the ear. This will make you dizzy. If your provider tells you to rinse your ear, use only warm water or follow his or her instructions.    Check the ear for signs of infection or irritation listed below under When to seek medical advice.  Steps for using eardrops  1. Warm the medicine bottle by rubbing it between your hands for a few minutes.  2. Lie down on your side, with the affected ear up.  3. Place the recommended number of drops in the ear. Wet a cotton ball with the medicine. Gently put the cotton ball into the ear opening.  Follow-up care  Follow up with your healthcare provider, or as directed.  When to seek medical advice  Call the provider right away if you have:    Ear pain that gets worse    Fever of 100.4F F (38 C) or higher, or as directed by your healthcare provider    Worsening wax buildup    Severe pain, dizziness, or nausea    Bleeding from the ear    Hearing problems    Signs of irritation from the eardrops, such as burning, stinging, or swelling and tenderness    Foul-smelling fluid draining from the ear    Swelling, redness, or tenderness of the outer ear    Headache, neck pain, or stiff neck  Date Last Reviewed: 6/1/2017 2000-2017 [employer]. [address] [place] [id number]. All rights reserved. This information is not intended as a substitute for professional medical care. Always follow your healthcare professional's instructions.

## 2021-06-30 NOTE — PROGRESS NOTES
Progress Notes by Carmen Vernon, Diabetes Ed at 1/19/2021 11:00 AM     Author: Carmen Vernon, Diabetes Ed Service: -- Author Type: Diabetes Ed    Filed: 1/26/2021 12:45 PM Encounter Date: 1/19/2021 Status: Attested    : Carmen Vernon, Diabetes Ed (Diabetes Ed) Cosigner: James Aguilar MD at 1/26/2021 12:47 PM    Attestation signed by James Aguilar MD at 1/26/2021 12:47 PM                     Assessment: Lm is here alone today for his Diabetes education.  He was diagnosed last year with an A1c of 14.7%.  His follow-up has not been consistent and stated did not have Diabetes education after.  His most recent A1c was 10.9%.    He is currently taking 35 units of Lantus at night and Metformin XR 1000mg two times a day.  Stated he is good about taking his medications daily.    He is currently checking his blood sugars 2-3 times daily.  Stated most of his readings are running 170's-180's before meals and at bedtime.  He has not seen 200's real often and has not had any trouble with low blood sugars.    We reviewed his current meal plan:  Wake-up 8am  B-egg and cheese in muffin tin  1-2  Greek yogurt  No coffee  Granola and yogurt and fruit  L-trying to take chicken  Salsa  Sweet potatoes  Used to eat white rice  Carrot yogurt  D-last night chicken and salsa and yogurt and glass of milk and carrot  Milk-less now than before  Fast food some days  Same meals most days  Not big on meat-chicken and fish  Soda-no  Snacks-  Chips or crackers  Apple or banana  SF jello  Milk-kind of  Yogurt-yes  BG tends to be higher if snacking  Cottage cheese-yes  Cheese-yes  Nuts-yes  Fruit-likes most  Vegetables-  Pretty much broccoli and cauliflower and carrots  We reviewed carb counting, carb ID, amount of carb to eat in a sitting, appropriate portions, how often to eat and the role protein and fiber play in glucose control.    Lm stated he tries to walk 1-2 times a day for about 2-3 miles depending on the  weather.  He works in a [employer] and is active during the day.  Stated he does a lot of walking and lifting.      Plan: Lm would like to work on meal planning and activity before making adjustments to his medications.  He will follow-up in 4 weeks to check in and possible adjustments at that time.    Subjective and Objective:      Lm Leigh is referred by Dr. James Aguilar for Diabetes Education.     Lab Results   Component Value Date    HGBA1C 10.9 (H) 12/23/2020     Follow up:   CDE (certified diabetic educator)      Education:     Monitoring   Meter (per above goals): Assessed and Discussed  Monitoring: Assessed and Discussed  BG goals: Assessed, Discussed and Literature provided    Nutrition Management  Nutrition Management: Assessed and Discussed  Weight: Assessed and Discussed  Portions/Balance: Assessed, Discussed and Literature provided  Carb ID/Count: Assessed, Discussed and Literature provided  Label Reading: Assessed, Discussed and Literature provided  Heart Healthy Fats: Assessed, Discussed and Literature provided  Menu Planning: Assessed, Discussed and Literature provided  Dining Out: Assessed, Discussed and Literature provided  Physical Activity: Assessed and Discussed  Medications: Assessed and Discussed  Orals: Assessed and Discussed  Injected Medications: Discussed   Storage/Exp:Not addressed   Site Rotation: Not addressed   Sites Assessed: no    Diabetes Disease Process: Assessed and Discussed    Acute Complications: Prevent, Detect, Treat:  Hypoglycemia: Assessed and Discussed  Hyperglycemia: Assessed and Discussed      Chronic Complications  Goal Setting and Problem Solving: Assessed and Discussed  Barriers: Assessed and Discussed  Psychosocial Adjustments: Assessed and Discussed      Time spent with the patient: 60 minutes for diabetes education and counseling.   Previous Education: no  Visit Type:LAVINIA Vernon  1/26/2021

## 2021-06-30 NOTE — PROGRESS NOTES
Progress Notes by Carmen Vernon, Diabetes Ed at 2/4/2021  8:00 AM     Author: Carmen Vernon, Diabetes Ed Service: -- Author Type: Diabetes Ed    Filed: 2/17/2021  4:25 PM Encounter Date: 2/4/2021 Status: Attested    : Carmen Vernon, Diabetes Ed (Diabetes Ed) Cosigner: James Aguilar MD at 2/17/2021  4:50 PM    Attestation signed by James Aguilar MD at 2/17/2021  4:50 PM                     Assessment: Lm is here alone today for his follow-up on Diabetes.  Stated he has been focusing on meal planning and feels this has been going well.    He has been having trouble with chest pain off and on over the last few weeks.  He stress test and labs have come back normal.  He finished a 2 week cycle of Prilosec yesterday.  Stated he has not had pain the last 2 days.    He is taking 40 units on Lantus and Metformin XR 1000mg two times a day.    He is checking his blood sugars 2-3 times per day and has found they are more stable through out the day than before.  They are also lower, with readings ranging from 101-156.  His FBG this morning was 145.  Dicussed how morning readings can be highest readings of the day and this tends to be his pattern based on his readings.    He has made some changes to his meal plan, which are noted below:  B-egg, little shredded potato, little greek yogurt, low carb wrap  No fruit if wrap  40-45 carbs  Drinking water mostly  L-shredded sprouts, chicken maybe bread  D-vegetables, and fish if not chicken  Regular potato, sweet have not been good lately  Sprouts  He is tracking his food using [employer].  Stated this is helping him watch his carb intake but also see if there are any foods that trigger chest pain.    Lm stated he was nervous about starting to lift weights again with all the heart issues he has been having.  Once that is cleared up he plans to get to that.  Continues to be active at work walking and light lifting when needed.    All additional  questions and concerns addressed today.    Plan: Lm will continue working on his meal planning and start to add some more activity in slowly.  He will follow-up with his primary in March and see me sooner after that.    Subjective and Objective:      Lm Leigh is referred by Dr. James Aguilar for Diabetes Education.     Lab Results   Component Value Date    HGBA1C 10.9 (H) 12/23/2020       Follow up:   Primary care visit  CDE (certified diabetic educator)      Education:     Monitoring   Meter (per above goals): Assessed and Discussed  Monitoring: Assessed and Discussed  BG goals: Assessed and Discussed    Nutrition Management  Nutrition Management: Assessed and Discussed  Weight: Assessed and Discussed  Portions/Balance: Assessed and Discussed  Carb ID/Count: Assessed and Discussed  Label Reading: Assessed and Discussed  Heart Healthy Fats: Assessed and Discussed  Menu Planning: Assessed and Discussed  Dining Out: Assessed and Discussed  Physical Activity: Assessed and Discussed  Medications: Assessed and Discussed  Orals: Assessed and Discussed  Injected Medications: Assessed and Discussed   Storage/Exp:Assessed and Discussed   Site Rotation: Assessed and Discussed   Sites Assessed: no    Diabetes Disease Process: Assessed and Discussed    Acute Complications: Prevent, Detect, Treat:  Hypoglycemia: Assessed and Discussed  Hyperglycemia: Assessed and Discussed    Chronic Complications  Goal Setting and Problem Solving: Assessed and Discussed  Barriers: Assessed and Discussed  Psychosocial Adjustments: Assessed and Discussed      Time spent with the patient: 30 minutes for diabetes education and counseling.   Previous Education: yes  Visit Type:LAVINIA Vernon  2/4/2021

## 2021-06-30 NOTE — PROGRESS NOTES
"Progress Notes by Jaquelin Valera PA-C at 12/4/2020  6:20 PM     Author: Jaquelin Valera PA-C Service: -- Author Type: Physician Assistant    Filed: 12/4/2020  6:57 PM Encounter Date: 12/4/2020 Status: Signed    : Jaquelin Valera PA-C (Physician Assistant)         Assessment & Plan:       1. Cellulitis of right lower extremity  cephalexin (KEFLEX) 500 MG capsule   2. Benign essential hypertension     3. Type 2 diabetes mellitus without complication, with long-term current use of insulin (H)        Medical Decision Making  Patient with history of type 2 diabetes presents with a wound to the right lower extremity.  There is significant increased warmth to touch and localized erythema that is concerning for cellulitis.  Will treat patient with oral antibiotics.  Marked wound for symptom monitoring.  Otherwise discussed wound care and expected course of healing.  Patient was also found to be hypertensive today.  Feel this is likely due to \"white coat syndrome\".  Recommend patient follow-up with primary care in 1 to 2 weeks for blood pressure recheck.  Discussed signs of worsening symptoms and when to follow-up with PCP if no symptom improvement.    Patient Instructions   You were seen today for a skin infection known as cellulitis.    Symptom management:  - Take antibiotics as prescribed for the full course, even if symptoms improve  - Keep the affected area clean with gentle water and soap  - If possible, keep the area elevated above the chest to help with swelling  - May use tylenol or ibuprofen for pain or discomfort as needed    Reasons to return immediately for re-evaluation:  - Difficulty or pain with moving joints around the affected area  - Develop discharge or pus-like drainage  - Fever of 100.4 or higher  - Worsening pain/swelling or spreading redness outside the marked area after 24 hours of antibiotics  - Vomiting    Otherwise, follow-up as directed or as needed with your primary care " provider      Patient to follow up with Primary Care provider regarding elevated blood pressure.          Subjective:       Lm Leigh is a 40 y.o. male with history of hypertension and type 2 diabetes, here for evaluation of a possible skin infection.  Patient sustained an injury to the right lower extremity 2 weeks ago.  Has had some improvement since then, but otherwise continues to erythematous.  He states the erythema has not spread.  Patient has been using topical antibiotics limitedly.  He otherwise denies fevers, nausea, and vomiting.    The following portions of the patient's history were reviewed and updated as appropriate: allergies, current medications and problem list.    Review of Systems  Pertinent items are noted in HPI.     Allergies  Allergies   Allergen Reactions   ? Shellfish Containing Products Anaphylaxis   ? Lisinopril Cough       No family history on file.    Social History     Socioeconomic History   ? Marital status: Single     Spouse name: None   ? Number of children: None   ? Years of education: None   ? Highest education level: None   Occupational History   ? None   Social Needs   ? Financial resource strain: None   ? Food insecurity     Worry: None     Inability: None   ? Transportation needs     Medical: None     Non-medical: None   Tobacco Use   ? Smoking status: Never Smoker   ? Smokeless tobacco: Never Used   Substance and Sexual Activity   ? Alcohol use: Not Currently   ? Drug use: Not Currently   ? Sexual activity: None   Lifestyle   ? Physical activity     Days per week: None     Minutes per session: None   ? Stress: None   Relationships   ? Social connections     Talks on phone: None     Gets together: None     Attends Hoah[employer]ism service: None     Active member of club or organization: None     Attends meetings of clubs or organizations: None     Relationship status: None   ? Intimate partner violence     Fear of current or ex partner: None     Emotionally abused: None      Physically abused: None     Forced sexual activity: None   Other Topics Concern   ? None   Social History Narrative   ? None         Objective:       BP (!) 173/96 (Patient Site: Right Arm, Patient Position: Sitting, Cuff Size: Adult Large)   Pulse 100   Temp 98.1  F (36.7  C)   Resp 20   SpO2 97%   General appearance: alert, appears stated age, cooperative, no distress and non-toxic  Skin: Small scab located at the right lower extremity about 5 mm in diameter with dark red erythema surrounding about 2 cm in diameter, erythema is significantly warm to touch, no purulence seen

## 2021-07-03 ENCOUNTER — RECORDS - HEALTHEAST (OUTPATIENT)
Dept: FAMILY MEDICINE | Facility: CLINIC | Age: 41
End: 2021-07-03

## 2021-07-04 NOTE — TELEPHONE ENCOUNTER
Telephone Encounter by Brenda Davila RN at 7/3/2021  3:01 PM     Author: Brenda Davila RN Service: -- Author Type: Registered Nurse    Filed: 7/3/2021  3:01 PM Encounter Date: 7/3/2021 Status: Signed    : Brenda Davila RN (Registered Nurse)       RN cannot approve Refill Request    RN can NOT refill this medication PCP messaged that patient is overdue for Labs. Last office visit: 3/23/2021 James Aguilar MD Last Physical: Visit date not found Last MTM visit: Visit date not found Last visit same specialty: 3/23/2021 James Aguilar MD.  Next visit within 3 mo: Visit date not found  Next physical within 3 mo: Visit date not found      [facility] Connection Triage/Med Refill 7/3/2021    Requested Prescriptions   Pending Prescriptions Disp Refills   ? metFORMIN (GLUCOPHAGE-XR) 500 MG 24 hr tablet [Pharmacy Med Name: METFORMIN HCL  MG TABLET] 360 tablet 0     Sig: TAKE 2 TABLETS BY MOUTH TWICE A DAY       Metformin Refill Protocol Failed - 7/3/2021 10:06 AM        Failed - LFT or AST or ALT in last 12 months     Albumin   Date Value Ref Range Status   12/28/2019 2.7 (L) 3.5 - 5.0 g/dL Final     Bilirubin, Total   Date Value Ref Range Status   12/28/2019 0.7 0.0 - 1.0 mg/dL Final     Alkaline Phosphatase   Date Value Ref Range Status   12/28/2019 55 45 - 120 U/L Final     AST   Date Value Ref Range Status   12/28/2019 9 0 - 40 U/L Final     ALT   Date Value Ref Range Status   12/28/2019 17 0 - 45 U/L Final     Protein, Total   Date Value Ref Range Status   12/28/2019 6.4 6.0 - 8.0 g/dL Final                Passed - Blood pressure in last 12 months     BP Readings from Last 1 Encounters:   03/23/21 152/86             Passed - GFR or Serum Creatinine in last 6 months     GFR MDRD Non Af Amer   Date Value Ref Range Status   03/23/2021 >60 >60 mL/min/1.73m2 Final     GFR MDRD Af Amer   Date Value Ref Range Status   03/23/2021 >60 >60 mL/min/1.73m2 Final             Passed - Visit  with PCP or prescribing provider visit in last 6 months or next 3 months     Last office visit with prescriber/PCP: 3/23/2021 OR same dept: 3/23/2021 James Aguilar MD OR same specialty: 3/23/2021 James Aguilar MD Last physical: Visit date not found Last MTM visit: Visit date not found         Next appt within 3 mo: Visit date not found  Next physical within 3 mo: Visit date not found  Prescriber OR PCP: James Aguilar MD  Last diagnosis associated with med order: 1. Type 2 diabetes mellitus with hyperglycemia, without long-term current use of insulin (H)  - metFORMIN (GLUCOPHAGE-XR) 500 MG 24 hr tablet [Pharmacy Med Name: METFORMIN HCL  MG TABLET]; TAKE 2 TABLETS BY MOUTH TWICE A DAY  Dispense: 360 tablet; Refill: 0     If protocol passes may refill for 12 months if within 3 months of last provider visit (or a total of 15 months).           Passed - A1C in last 6 months     Hemoglobin A1c   Date Value Ref Range Status   03/23/2021 8.4 (H) <=5.6 % Final               Passed - Microalbumin in last year      Microalbumin, Random Urine   Date Value Ref Range Status   12/23/2020 72.68 (H) 0.00 - 1.99 mg/dL Final

## 2021-07-12 ENCOUNTER — OFFICE VISIT (OUTPATIENT)
Dept: FAMILY MEDICINE | Facility: CLINIC | Age: 41
End: 2021-07-12
Payer: COMMERCIAL

## 2021-07-12 VITALS
BODY MASS INDEX: 52.17 KG/M2 | OXYGEN SATURATION: 97 % | WEIGHT: 315 LBS | HEART RATE: 107 BPM | TEMPERATURE: 98.4 F | RESPIRATION RATE: 20 BRPM | DIASTOLIC BLOOD PRESSURE: 91 MMHG | SYSTOLIC BLOOD PRESSURE: 138 MMHG

## 2021-07-12 DIAGNOSIS — L72.3 SEBACEOUS CYST: Primary | ICD-10-CM

## 2021-07-12 PROCEDURE — 99213 OFFICE O/P EST LOW 20 MIN: CPT | Performed by: PHYSICIAN ASSISTANT

## 2021-07-12 RX ORDER — PEN NEEDLE, DIABETIC 32GX 5/32"
NEEDLE, DISPOSABLE MISCELLANEOUS
COMMUNITY
Start: 2021-05-25 | End: 2022-06-08

## 2021-07-12 RX ORDER — BLOOD SUGAR DIAGNOSTIC
STRIP MISCELLANEOUS
COMMUNITY
Start: 2020-04-01 | End: 2024-02-28

## 2021-07-12 RX ORDER — FLURBIPROFEN SODIUM 0.3 MG/ML
SOLUTION/ DROPS OPHTHALMIC
COMMUNITY
Start: 2021-05-25 | End: 2022-02-03

## 2021-07-13 NOTE — PROGRESS NOTES
Patient presents with:  Mass: in groin area x 2 days       Clinical Decision Making: No current signs concerning for cellulitis of the scrotum.  This mass is not next to the testicle.  Feels consistent with sebaceous cyst.  No current findings concerning for Lizzette's gangrene.  Recommend follow-up with surgical dermatology for removal.      ICD-10-CM    1. Sebaceous cyst  L72.3 Adult Dermatology Referral       Patient Instructions   1.  There is no current signs of infection.  2.  Continue applying your antifungal topical medication.  3.  I placed a referral for Derm.  Our referral team should contact you within the next 2 business days for scheduling the removal of this cyst.  4.  Seek emergency medical attention if you develop significant testicle swelling, fever, chills, or severe pain of the testicles.      HPI:  Lm Leigh is a 40 year old male who presents today complaining of mass in scrotum for the past 2 days. Patient denies any significant pain. He has a past medical history of type 2 diabetes which he tries to control. He states that his average blood sugars have been between 120 and 140. He has a past medical history of a similar mass on the right side of the groin that resulted in an infection in the scrotum that required him to be hospitalized. He denies any current discharge from the mass, significant skin changes, significant pain, or fevers or chills. The mass has not changed in size since he noticed it 2 days ago.    History obtained from the patient.    Problem List:  2020-10: Soft tissue abscess of inguinal region      Past Medical History:   Diagnosis Date     Hypertension      Obesity        Social History     Tobacco Use     Smoking status: Never Smoker     Smokeless tobacco: Never Used   Substance Use Topics     Alcohol use: Not Currently       Review of Systems    Vitals:    07/12/21 1912 07/12/21 1918   BP: (!) 140/91 (!) 138/91   Pulse: 107    Resp: 20    Temp: 98.4  F (36.9  C)     TempSrc: Oral    SpO2: 97%    Weight: (!) 176.9 kg (390 lb)        Physical Exam  Vitals and nursing note reviewed.   Constitutional:       General: He is not in acute distress.     Appearance: He is obese. He is not toxic-appearing or diaphoretic.   HENT:      Head: Normocephalic and atraumatic.      Right Ear: External ear normal.      Left Ear: External ear normal.   Eyes:      Conjunctiva/sclera: Conjunctivae normal.   Cardiovascular:      Heart sounds: No murmur heard.     Pulmonary:      Effort: Pulmonary effort is normal. No respiratory distress.   Genitourinary:     Testes: Normal.         Right: Mass not present.         Left: Mass not present.       Neurological:      Mental Status: He is alert.      Deep Tendon Reflexes: Abnormal reflex:    Psychiatric:         Mood and Affect: Mood normal.         Behavior: Behavior normal.         Thought Content: Thought content normal.         Judgment: Judgment normal.         At the end of the encounter, I discussed results, diagnosis, medications. Discussed red flags for immediate return to clinic/ER, as well as indications for follow up if no improvement. Patient understood and agreed to plan. Patient was stable for discharge.

## 2021-07-13 NOTE — PATIENT INSTRUCTIONS
1.  There is no current signs of infection.  2.  Continue applying your antifungal topical medication.  3.  I placed a referral for Derm.  Our referral team should contact you within the next 2 business days for scheduling the removal of this cyst.  4.  Seek emergency medical attention if you develop significant testicle swelling, fever, chills, or severe pain of the testicles.

## 2021-08-08 ENCOUNTER — HEALTH MAINTENANCE LETTER (OUTPATIENT)
Age: 41
End: 2021-08-08

## 2021-08-26 ENCOUNTER — OFFICE VISIT (OUTPATIENT)
Dept: FAMILY MEDICINE | Facility: CLINIC | Age: 41
End: 2021-08-26
Payer: COMMERCIAL

## 2021-08-26 VITALS
HEART RATE: 99 BPM | DIASTOLIC BLOOD PRESSURE: 95 MMHG | BODY MASS INDEX: 53.24 KG/M2 | TEMPERATURE: 98 F | WEIGHT: 315 LBS | OXYGEN SATURATION: 97 % | SYSTOLIC BLOOD PRESSURE: 146 MMHG | RESPIRATION RATE: 20 BRPM

## 2021-08-26 DIAGNOSIS — L98.9 SORE ON LEG: Primary | ICD-10-CM

## 2021-08-26 PROCEDURE — 99213 OFFICE O/P EST LOW 20 MIN: CPT | Performed by: FAMILY MEDICINE

## 2021-08-27 NOTE — PROGRESS NOTES
Assessment & Plan     Sore on leg    Patient with a small reddened mildly sore area of erythema on his left lower leg, I suspect this may be due to some type of a insect bite or possibly localized skin reaction.  No obvious signs of infection.  It is not been enlarging.  Recommend continuing to use topical antibiotic ointment on it and continue to monitor symptoms.  No evidence of cellulitis at this time or need for oral antibiotic.        Return in about 3 days (around 8/29/2021) for if symptoms getting worse or not improving, In-Clinic Visit.    Kristi Torres MD  [facility]    Marycarmen Amato is a 40 year old who presents for the following health issues     HPI     Patient comes in for a reddened mildly under area on his left lower leg that he noticed 3 days ago.  It has not been enlarging in size.  He has been using a topical antibiotic ointment on it which he does not think has really been helping.  Is not itchy.  Measures approximately 2 cm.  He is not aware of any type of bug bite.  No other complaints or concerns.  No other systemic symptoms.    Review of Systems   Constitutional, HEENT, cardiovascular, pulmonary, gi and gu systems are negative, except as otherwise noted.      Objective    BP (!) 146/95   Pulse 99   Temp 98  F (36.7  C)   Resp 20   Wt (!) 180.5 kg (398 lb)   SpO2 97%   BMI 53.24 kg/m    Body mass index is 53.24 kg/m .  Physical Exam   GENERAL: healthy, alert and no distress  SKIN: Patient with approximately 2 cm erythematous confluent area on his left lower leg.  It does appear that there is perhaps slightly raised and firm area in the center consistent with an insect bite.  There is no warmth.  It is very minimally tender to the touch.  No satellite lesions.  No central clearing.

## 2021-09-03 ENCOUNTER — TELEPHONE (OUTPATIENT)
Dept: DERMATOLOGY | Facility: CLINIC | Age: 41
End: 2021-09-03

## 2021-09-03 NOTE — TELEPHONE ENCOUNTER
LVM w/pt to return call to schedule a New pt appointment with [facility] providers (see referral for reason).    Michele Reddy, Procedure

## 2021-10-01 ENCOUNTER — OFFICE VISIT (OUTPATIENT)
Dept: FAMILY MEDICINE | Facility: CLINIC | Age: 41
End: 2021-10-01
Payer: COMMERCIAL

## 2021-10-01 VITALS
SYSTOLIC BLOOD PRESSURE: 132 MMHG | HEART RATE: 71 BPM | DIASTOLIC BLOOD PRESSURE: 82 MMHG | WEIGHT: 315 LBS | BODY MASS INDEX: 51.34 KG/M2 | OXYGEN SATURATION: 97 %

## 2021-10-01 DIAGNOSIS — I10 ESSENTIAL HYPERTENSION: ICD-10-CM

## 2021-10-01 DIAGNOSIS — E66.01 CLASS 3 SEVERE OBESITY DUE TO EXCESS CALORIES WITH SERIOUS COMORBIDITY AND BODY MASS INDEX (BMI) OF 50.0 TO 59.9 IN ADULT (H): ICD-10-CM

## 2021-10-01 DIAGNOSIS — R80.9 MICROALBUMINURIA: ICD-10-CM

## 2021-10-01 DIAGNOSIS — E78.5 DYSLIPIDEMIA: ICD-10-CM

## 2021-10-01 DIAGNOSIS — E66.813 CLASS 3 SEVERE OBESITY DUE TO EXCESS CALORIES WITH SERIOUS COMORBIDITY AND BODY MASS INDEX (BMI) OF 50.0 TO 59.9 IN ADULT (H): ICD-10-CM

## 2021-10-01 DIAGNOSIS — E66.01 MORBID OBESITY (H): ICD-10-CM

## 2021-10-01 DIAGNOSIS — E11.65 TYPE 2 DIABETES MELLITUS WITH HYPERGLYCEMIA, WITHOUT LONG-TERM CURRENT USE OF INSULIN (H): Primary | ICD-10-CM

## 2021-10-01 LAB
ANION GAP SERPL CALCULATED.3IONS-SCNC: 13 MMOL/L (ref 5–18)
BUN SERPL-MCNC: 14 MG/DL (ref 8–22)
CALCIUM SERPL-MCNC: 9.1 MG/DL (ref 8.5–10.5)
CHLORIDE BLD-SCNC: 106 MMOL/L (ref 98–107)
CHOLEST SERPL-MCNC: 157 MG/DL
CO2 SERPL-SCNC: 20 MMOL/L (ref 22–31)
CREAT SERPL-MCNC: 0.93 MG/DL (ref 0.7–1.3)
FASTING STATUS PATIENT QL REPORTED: YES
GFR SERPL CREATININE-BSD FRML MDRD: >90 ML/MIN/1.73M2
GLUCOSE BLD-MCNC: 128 MG/DL (ref 70–125)
HBA1C MFR BLD: 7.8 % (ref 0–5.6)
HDLC SERPL-MCNC: 46 MG/DL
LDLC SERPL CALC-MCNC: 91 MG/DL
POTASSIUM BLD-SCNC: 4 MMOL/L (ref 3.5–5)
SODIUM SERPL-SCNC: 139 MMOL/L (ref 136–145)
TRIGL SERPL-MCNC: 101 MG/DL

## 2021-10-01 PROCEDURE — 99214 OFFICE O/P EST MOD 30 MIN: CPT | Performed by: FAMILY MEDICINE

## 2021-10-01 PROCEDURE — 90471 IMMUNIZATION ADMIN: CPT | Performed by: FAMILY MEDICINE

## 2021-10-01 PROCEDURE — 83036 HEMOGLOBIN GLYCOSYLATED A1C: CPT | Performed by: FAMILY MEDICINE

## 2021-10-01 PROCEDURE — 90686 IIV4 VACC NO PRSV 0.5 ML IM: CPT | Performed by: FAMILY MEDICINE

## 2021-10-01 PROCEDURE — 80061 LIPID PANEL: CPT | Performed by: FAMILY MEDICINE

## 2021-10-01 PROCEDURE — 36415 COLL VENOUS BLD VENIPUNCTURE: CPT | Performed by: FAMILY MEDICINE

## 2021-10-01 PROCEDURE — 80048 BASIC METABOLIC PNL TOTAL CA: CPT | Performed by: FAMILY MEDICINE

## 2021-10-01 RX ORDER — METOPROLOL TARTRATE 100 MG
100 TABLET ORAL 2 TIMES DAILY
Qty: 180 TABLET | Refills: 3 | Status: SHIPPED | OUTPATIENT
Start: 2021-10-01 | End: 2022-11-16

## 2021-10-01 RX ORDER — INSULIN GLARGINE 100 [IU]/ML
40 INJECTION, SOLUTION SUBCUTANEOUS AT BEDTIME
Qty: 45 ML | Refills: 3 | COMMUNITY
Start: 2021-10-01 | End: 2021-11-29

## 2021-10-01 RX ORDER — AMLODIPINE BESYLATE 10 MG/1
10 TABLET ORAL DAILY
COMMUNITY
Start: 2021-07-25 | End: 2022-02-01

## 2021-10-01 RX ORDER — LOSARTAN POTASSIUM 100 MG/1
100 TABLET ORAL DAILY
COMMUNITY
Start: 2021-08-22 | End: 2022-02-01

## 2021-10-01 RX ORDER — METFORMIN HCL 500 MG
1000 TABLET, EXTENDED RELEASE 24 HR ORAL 2 TIMES DAILY WITH MEALS
Qty: 360 TABLET | Refills: 3 | Status: SHIPPED | OUTPATIENT
Start: 2021-10-01 | End: 2022-10-03

## 2021-10-01 NOTE — PROGRESS NOTES
Assessment/Plan:    Type 2 diabetes mellitus with hyperglycemia, without long-term current use of insulin (H)  Type 2 diabetes reviewed with the further improvement.  A1c originally 14.7% 12/27/2019 and subsequently improved to 10.9% and then most recently 8.4% 3/23/2021.  Continues to work with [employer] diabetes educator and remains on Lantus 40 units daily plus Metformin  mg using 2 tablets twice daily.  A1c today improved to 7.8% and will complete diabetes recheck in January 2022.  Unable to give urine follow-up for history of microalbuminuria.  Annual eye exams to be completed.  Prior monofilament testing normal.  - Hemoglobin A1c  - metFORMIN (GLUCOPHAGE-XR) 500 MG 24 hr tablet  Dispense: 360 tablet; Refill: 3  - LANTUS SOLOSTAR 100 UNIT/ML soln  Dispense: 45 mL; Refill: 3  - Basic metabolic panel    Essential hypertension  Blood pressure at goal.  Continues amlodipine after increasing previously from 5 mg up to 10 mg daily while remaining on losartan 100 mg daily metoprolol tartrate 100 mg twice daily.  - metoprolol tartrate (LOPRESSOR) 100 MG tablet  Dispense: 180 tablet; Refill: 3  - Basic metabolic panel    Microalbuminuria  Anticipate repeat testing at follow-up visit in approximately 3 months with prior ratio of 286.7 noted.  Continues angiotensin receptor blocker for renal protective benefits.    Class 3 severe obesity due to excess calories with serious comorbidity and body mass index (BMI) of 50.0 to 59.9 in adult (H)  Continue attempts at weight goal less than 350 pounds initially, less than 300 pounds ideally.    Morbid obesity (H)  As above.    Dyslipidemia  Dyslipidemia.  Check lipid cascade today while fasting.  - Lipid panel reflex to direct LDL Fasting    Recommendation for Pfizer COVID-19 third shot booster after 10/16/2021 based on morbid obesity and diabetes history.    The following high BMI interventions were performed this visit: encouragement to exercise, weight monitoring, weight loss  "from baseline weight and lifestyle education regarding diet .  Ensure ongoing efforts to achieve weight goal < 350 pounds initially, < 300 pounds ideally.         Subjective:    Lm Leigh is seen today for follow-up assessment.  Diagnosed with diabetes 12/27/2019 with A1c of 14.7%.  Subsequent improvement after initiating Metformin XR currently 500 mg taken 2 tablets twice daily plus Lantus insulin 40 units in the evening.  Working with K[employer] diabetes educator.  Blood sugars range between 101 140 especially over the last month while attempting better control with less dietary indiscretions.  Blood sugars often 120 to 1:30 in the morning a little lower in the afternoon in general.  Had increase amlodipine previously from 5 mg up to 10 mg daily while continuing losartan 100 mg daily metoprolol tartrate 100 mg twice daily.  Losartan also for renal protective benefits with microalbuminuria.  Patient is fasting.  Right ear discomfort at times.  Has had cerumen buildup issues in the past and admits to using Q-tips which \"jams it in tighter than a musket ball\".  Has completed Pfizer COVID-19 vaccine series 4/16/2021.    Single   No children   Tobacco:  none   EtOH:  never (by choice) - grandparents with EtOH issue   Mom - type 2 diabetes   Dad -   1 younger sis -   Surgeries:  none   Hospitalizations:  right groin drainage at [facility] after admission ([facility]) with infection into testicle...   Work:  [employer]   Hobbies:  collect autographs    No past surgical history on file.     No family history on file.     Past Medical History:   Diagnosis Date     Hypertension      Obesity         Social History     Tobacco Use     Smoking status: Never Smoker     Smokeless tobacco: Never Used   Substance Use Topics     Alcohol use: Not Currently     Drug use: Not Currently        Current Outpatient Medications   Medication Sig Dispense Refill     aspirin (ASA) 81 MG EC tablet Take 81 mg by mouth       LANTUS SOLOSTAR 100 " UNIT/ML soln Inject 40 Units Subcutaneous At Bedtime 45 mL 3     metFORMIN (GLUCOPHAGE-XR) 500 MG 24 hr tablet Take 2 tablets (1,000 mg) by mouth 2 times daily (with meals) 360 tablet 3     metoprolol tartrate (LOPRESSOR) 100 MG tablet Take 1 tablet (100 mg) by mouth 2 times daily 180 tablet 3     amLODIPine (NORVASC) 10 MG tablet Take 10 mg by mouth daily       BD PEN NEEDLE ZION 2ND GEN 32G X 4 MM miscellaneous USE TO INJECT INSULIN ONCE DAILY (Patient not taking: Reported on 8/26/2021)       blood glucose (NO BRAND SPECIFIED) lancets standard Use to check blood sugars three times daily. Dispense brand per patient's insurance at pharmacy discretion. (Patient not taking: Reported on 8/26/2021)       blood glucose (PRECISION QID TEST) test strip Use to check blood sugars three times daily. Strips compatible with [employer] guide. Dispense brand per patient's insurance at pharmacy discretion. (Patient not taking: Reported on 8/26/2021)       insulin pen needle ([employer] SAFEPACK PEN NEEDLE) 32G X 4 MM miscellaneous Use to inject insulin once daily (Patient not taking: Reported on 8/26/2021)       losartan (COZAAR) 100 MG tablet Take 100 mg by mouth daily       mupirocin (BACTROBAN) 2 % external ointment Apply topically 2 times daily (Patient not taking: Reported on 8/26/2021) 22 g 0          Objective:    Vitals:    10/01/21 0903   BP: 132/82   BP Location: Left arm   Patient Position: Sitting   Cuff Size: Adult Large   Pulse: 71   SpO2: 97%   Weight: (!) 174.1 kg (383 lb 12.8 oz)      Body mass index is 51.34 kg/m .    Alert.  No apparent distress.  BMI 51.34.  Cerumen buildup more right greater than left external auditory canals without evidence for otitis externa etc.  Curette removal with excellent results of wax removal.  Cardiac exam regular.      This note has been dictated using voice recognition software and as a result may contain minor grammatical errors and unintended word substitutions.    AMS Myocl[employer] Movements, Prolonged Qtc prolonged QT AMS

## 2021-10-15 ENCOUNTER — OFFICE VISIT (OUTPATIENT)
Dept: FAMILY MEDICINE | Facility: CLINIC | Age: 41
End: 2021-10-15
Payer: COMMERCIAL

## 2021-10-15 VITALS
TEMPERATURE: 98.3 F | HEART RATE: 74 BPM | DIASTOLIC BLOOD PRESSURE: 100 MMHG | RESPIRATION RATE: 20 BRPM | SYSTOLIC BLOOD PRESSURE: 165 MMHG | OXYGEN SATURATION: 97 %

## 2021-10-15 DIAGNOSIS — H60.392 INFECTIVE OTITIS EXTERNA, LEFT: Primary | ICD-10-CM

## 2021-10-15 PROCEDURE — 99213 OFFICE O/P EST LOW 20 MIN: CPT | Performed by: FAMILY MEDICINE

## 2021-10-15 RX ORDER — CIPROFLOXACIN AND DEXAMETHASONE 3; 1 MG/ML; MG/ML
4 SUSPENSION/ DROPS AURICULAR (OTIC) 2 TIMES DAILY
Qty: 7.5 ML | Refills: 0 | Status: SHIPPED | OUTPATIENT
Start: 2021-10-15 | End: 2021-10-22

## 2021-10-15 NOTE — PATIENT INSTRUCTIONS
Ear drops as directed.  Tylenol or ibuprofen as needed for ear pain.  Warm compress to ear.  Recheck if worsening or not improving

## 2021-10-15 NOTE — PROGRESS NOTES
OUTPATIENT VISIT NOTE                                                   Date of Visit: 10/15/2021     Chief Complaint   Patient presents with:  Otalgia            History of Present Illness   Lm Leigh is a 40 year old male c/o left ear pain for three days.  Harder to hear.  No fever.  Had ear cleaned out a few weeks ago with a curette and had a little bleeding  Has been using some neomycin ear drops but hasn't improved.  Used some ibuprofen--helped.         MEDICATIONS   Current Outpatient Medications   Medication     amLODIPine (NORVASC) 10 MG tablet     aspirin (ASA) 81 MG EC tablet     BD PEN NEEDLE ZION 2ND GEN 32G X 4 MM miscellaneous     blood glucose (NO BRAND SPECIFIED) lancets standard     blood glucose (PRECISION QID TEST) test strip     ciprofloxacin-dexamethasone (CIPRODEX) 0.3-0.1 % otic suspension     insulin pen needle (ULTIG[employer]ARD SAFEPACK PEN NEEDLE) 32G X 4 MM miscellaneous     LANTUS SOLOSTAR 100 UNIT/ML soln     losartan (COZAAR) 100 MG tablet     metFORMIN (GLUCOPHAGE-XR) 500 MG 24 hr tablet     metoprolol tartrate (LOPRESSOR) 100 MG tablet     No current facility-administered medications for this visit.         SOCIAL HISTORY   Social History     Tobacco Use     Smoking status: Never Smoker     Smokeless tobacco: Never Used   Substance Use Topics     Alcohol use: Not Currently           Physical Exam   Vitals:    10/15/21 1120 10/15/21 1124   BP: (!) 162/111 (!) 165/100   Pulse:  74   Resp:  20   Temp:  98.3  F (36.8  C)   SpO2:  97%        GEN:  NAD  EARS:  BL TMS normal. Right canal with small scab.  Left Ear painful with pressure on tragus and pinna.  Mild erythema of ear canal         Assessment and Plan     Infective otitis externa, left  Stop neomycin drops.  Ear drops as directed.  Tylenol or ibuprofen as needed for ear pain.  Warm compress to ear.  Recheck if worsening or not improving     Needs to be seen promtply if worsening  - ciprofloxacin-dexamethasone (CIPRODEX) 0.3-0.1 %  otic suspension  Dispense: 7.5 mL; Refill: 0                   Discussed signs / symptoms that warrant urgent / emergent medical attention.     Recheck if worsening or not improving.       Xiomara Bone MD          Pertinent History     The following portions of the patient's history were reviewed and updated as appropriate: allergies, current medications, past family history, past medical history, past social history, past surgical history and problem list.

## 2021-11-07 ENCOUNTER — OFFICE VISIT (OUTPATIENT)
Dept: FAMILY MEDICINE | Facility: CLINIC | Age: 41
End: 2021-11-07
Payer: COMMERCIAL

## 2021-11-07 VITALS
TEMPERATURE: 98.3 F | OXYGEN SATURATION: 96 % | SYSTOLIC BLOOD PRESSURE: 147 MMHG | RESPIRATION RATE: 20 BRPM | HEART RATE: 87 BPM | DIASTOLIC BLOOD PRESSURE: 83 MMHG

## 2021-11-07 DIAGNOSIS — H66.001 ACUTE SUPPURATIVE OTITIS MEDIA OF RIGHT EAR WITHOUT SPONTANEOUS RUPTURE OF TYMPANIC MEMBRANE, RECURRENCE NOT SPECIFIED: Primary | ICD-10-CM

## 2021-11-07 PROCEDURE — 99213 OFFICE O/P EST LOW 20 MIN: CPT | Performed by: NURSE PRACTITIONER

## 2021-11-07 RX ORDER — AMOXICILLIN 500 MG/1
1000 CAPSULE ORAL 2 TIMES DAILY
Qty: 40 CAPSULE | Refills: 0 | Status: SHIPPED | OUTPATIENT
Start: 2021-11-07 | End: 2021-11-17

## 2021-11-07 ASSESSMENT — ENCOUNTER SYMPTOMS
FEVER: 0
COUGH: 0
ACTIVITY CHANGE: 0
SORE THROAT: 0
CHILLS: 0
APPETITE CHANGE: 0

## 2021-11-07 NOTE — PATIENT INSTRUCTIONS
Patient Education     Middle Ear Infection (Otitis Media) in Adults  What is a middle ear infection?  A middle ear infection occurs behind the eardrum. It is most often caused by a virus or bacteria. Most kids have at least one middle ear infection by the time they are 3 years old. But adults can also get them.   What causes middle ear infections?  Inflammation in the middle ear most often starts after you ve had a sore throat, cold, or other upper respiratory problem. The infection spreads to the middle ear and causes fluid buildup behind the eardrum.    What are the symptoms of a middle ear infection?  These are the most common symptoms of middle ear infections in adults:     Ear pain    Feeling of fullness in the ear    Fluid draining from the ear    Fever    Hearing loss  These symptoms may look like other conditions or health problems. Always talk with your healthcare provider for a diagnosis.   How is a middle ear infection diagnosed?  Your healthcare provider will review your health history and do a physical exam. They will check the outer ear and the eardrum using an otoscope. This is a lighted tool that lets the healthcare provider see inside the ear. A pneumatic otoscope blows a puff of air into the ear to test eardrum movement. When there is fluid or infection in the middle ear, movement is decreased.   Your provider may also do a tympanometry. This is a test that directs air and sound to the middle ear.   If you have ear infections often, your healthcare provider may suggest having a hearing test.   How is a middle ear infection treated?  Treatment will depend on your symptoms, age, and general health. It will also depend on how severe the condition is.   Treatment may include:    Antibiotics    Pain relievers    Placing small tubes in the eardrum for chronic ear infections   What are possible complications of a middle ear infection?   Untreated ear infections can lead to:    Infection in other parts  of the head    Lasting (permanent) hearing loss    Speech and language problems  Can middle ear infections be prevented?  Cold and allergy medicines don't seem to prevent ear infections. And currently there is no vaccine that can prevent the disease. But check with your healthcare provider and make sure your vaccines are up-to-date. Living in a home where cigarettes are smoked can increase the chances of ear infections. So can living in a home where vaping devices, such as e-cigarettes and electronic nicotine, are used   Key points about middle ear infections    Middle ear infections can affect both children and adults.    Pain and fever can be the most common symptoms.    Without treatment, permanent hearing loss may happen.    Take antibiotics as prescribed and finish all of the prescription. This can help prevent antibiotic-resistant infections or incomplete treatment with the infection returning.    Keeping your home smoke-free or free of vaping devices can decrease the chances of ear infections.    Next steps  Tips to help you get the most from a visit to your healthcare provider:    Know the reason for your visit and what you want to happen.    Before your visit, write down questions you want answered.    Bring someone with you to help you ask questions and remember what your provider tells you.    At the visit, write down the name of a new diagnosis, and any new medicines, treatments, or tests. Also write down any new instructions your provider gives you.    Know why a new medicine or treatment is prescribed, and how it will help you. Also know what the side effects are.    Ask if your condition can be treated in other ways.    Know why a test or procedure is recommended and what the results could mean.    Know what to expect if you do not take the medicine or have the test or procedure.    If you have a follow-up appointment, write down the date, time, and purpose for that visit.    Know how you can contact  your provider if you have questions.  Mirza last reviewed this educational content on 1/1/2021 2000-2021 [employer]. All rights reserved. This information is not intended as a substitute for professional medical care. Always follow your healthcare professional's instructions.

## 2021-11-07 NOTE — PROGRESS NOTES
Patient presents with:  Otalgia: RT EAR PAIN FOR ABOUT 2 DAYS. JUST HAD A LT EAR INFECTION ABOUT A WEEK AGO.       Clinical Decision Making: Focused right ear infection on exam with mild nasal congestion. Will treat with course of antibiotics. Symptomatic cares and education provided.      ICD-10-CM    1. Acute suppurative otitis media of right ear without spontaneous rupture of tympanic membrane, recurrence not specified  H66.001 amoxicillin (AMOXIL) 500 MG capsule       Patient Instructions     Patient Education     Middle Ear Infection (Otitis Media) in Adults  What is a middle ear infection?  A middle ear infection occurs behind the eardrum. It is most often caused by a virus or bacteria. Most kids have at least one middle ear infection by the time they are 3 years old. But adults can also get them.   What causes middle ear infections?  Inflammation in the middle ear most often starts after you ve had a sore throat, cold, or other upper respiratory problem. The infection spreads to the middle ear and causes fluid buildup behind the eardrum.    What are the symptoms of a middle ear infection?  These are the most common symptoms of middle ear infections in adults:     Ear pain    Feeling of fullness in the ear    Fluid draining from the ear    Fever    Hearing loss  These symptoms may look like other conditions or health problems. Always talk with your healthcare provider for a diagnosis.   How is a middle ear infection diagnosed?  Your healthcare provider will review your health history and do a physical exam. They will check the outer ear and the eardrum using an otoscope. This is a lighted tool that lets the healthcare provider see inside the ear. A pneumatic otoscope blows a puff of air into the ear to test eardrum movement. When there is fluid or infection in the middle ear, movement is decreased.   Your provider may also do a tympanometry. This is a test that directs air and sound to the middle ear.   If you  have ear infections often, your healthcare provider may suggest having a hearing test.   How is a middle ear infection treated?  Treatment will depend on your symptoms, age, and general health. It will also depend on how severe the condition is.   Treatment may include:    Antibiotics    Pain relievers    Placing small tubes in the eardrum for chronic ear infections   What are possible complications of a middle ear infection?   Untreated ear infections can lead to:    Infection in other parts of the head    Lasting (permanent) hearing loss    Speech and language problems  Can middle ear infections be prevented?  Cold and allergy medicines don't seem to prevent ear infections. And currently there is no vaccine that can prevent the disease. But check with your healthcare provider and make sure your vaccines are up-to-date. Living in a home where cigarettes are smoked can increase the chances of ear infections. So can living in a home where vaping devices, such as e-cigarettes and electronic nicotine, are used   Key points about middle ear infections    Middle ear infections can affect both children and adults.    Pain and fever can be the most common symptoms.    Without treatment, permanent hearing loss may happen.    Take antibiotics as prescribed and finish all of the prescription. This can help prevent antibiotic-resistant infections or incomplete treatment with the infection returning.    Keeping your home smoke-free or free of vaping devices can decrease the chances of ear infections.    Next steps  Tips to help you get the most from a visit to your healthcare provider:    Know the reason for your visit and what you want to happen.    Before your visit, write down questions you want answered.    Bring someone with you to help you ask questions and remember what your provider tells you.    At the visit, write down the name of a new diagnosis, and any new medicines, treatments, or tests. Also write down any new  instructions your provider gives you.    Know why a new medicine or treatment is prescribed, and how it will help you. Also know what the side effects are.    Ask if your condition can be treated in other ways.    Know why a test or procedure is recommended and what the results could mean.    Know what to expect if you do not take the medicine or have the test or procedure.    If you have a follow-up appointment, write down the date, time, and purpose for that visit.    Know how you can contact your provider if you have questions.  [email or web address] last reviewed this educational content on 1/1/2021 2000-2021 [employer]. All rights reserved. This information is not intended as a substitute for professional medical care. Always follow your healthcare professional's instructions.               HPI: Lm Leigh is a 40 year old male with type II DM, ad HTN,who presents today complaining of 2 days of worsening right ear pain that he rates a 3-4/10 on the numeric pain scale.Reports recent illness with otitis externa in left ear, now resolved. Reports taking OTC ibuprofen with some relief.     History obtained from the patient.    Problem List:  2021-10: Diabetes mellitus, type 2 (H)  2021-10: Morbid obesity (H)  2020-10: Soft tissue abscess of inguinal region      Past Medical History:   Diagnosis Date     Hypertension      Obesity        Social History     Tobacco Use     Smoking status: Never Smoker     Smokeless tobacco: Never Used   Substance Use Topics     Alcohol use: Not Currently       Review of Systems   Constitutional: Negative for activity change, appetite change, chills and fever.   HENT: Positive for congestion and ear pain. Negative for sore throat.    Respiratory: Negative for cough.        Vitals:    11/07/21 1224   BP: (!) 147/83   BP Location: Right arm   Patient Position: Sitting   Cuff Size: Adult Large   Pulse: 87   Resp: 20   Temp: 98.3  F (36.8  C)   SpO2: 96%       Physical  Exam  Constitutional:       Appearance: Normal appearance. He is not ill-appearing or diaphoretic.   HENT:      Head: Normocephalic and atraumatic.      Left Ear: Tympanic membrane, ear canal and external ear normal.      Ears:      Comments: RIGHT TM with cloudy fluid, erythema and dull, no landmarks visualized.     Nose: Congestion present. No rhinorrhea.      Mouth/Throat:      Mouth: Mucous membranes are moist.      Pharynx: No oropharyngeal exudate or posterior oropharyngeal erythema.   Cardiovascular:      Rate and Rhythm: Normal rate and regular rhythm.      Pulses: Normal pulses.      Heart sounds: Normal heart sounds.   Pulmonary:      Effort: Pulmonary effort is normal.      Breath sounds: Normal breath sounds.   Musculoskeletal:      Cervical back: Neck supple.   Lymphadenopathy:      Cervical: No cervical adenopathy.   Skin:     General: Skin is warm.      Capillary Refill: Capillary refill takes less than 2 seconds.      Findings: No rash.   Neurological:      Mental Status: He is alert and oriented to person, place, and time.   Psychiatric:         Behavior: Behavior normal.         Labs:  No results found for any visits on 11/07/21.      At the end of the encounter, I discussed results, diagnosis, medications. Discussed red flags for immediate return to clinic/ER, as well as indications for follow up if no improvement. Patient understood and agreed to plan.     ELMER Hill CNP

## 2021-11-27 DIAGNOSIS — E11.65 TYPE 2 DIABETES MELLITUS WITH HYPERGLYCEMIA, WITHOUT LONG-TERM CURRENT USE OF INSULIN (H): ICD-10-CM

## 2021-11-29 RX ORDER — INSULIN GLARGINE 100 [IU]/ML
INJECTION, SOLUTION SUBCUTANEOUS
Qty: 15 ML | Refills: 1 | Status: SHIPPED | OUTPATIENT
Start: 2021-11-29 | End: 2022-01-31

## 2021-12-09 ENCOUNTER — OFFICE VISIT (OUTPATIENT)
Dept: FAMILY MEDICINE | Facility: CLINIC | Age: 41
End: 2021-12-09
Payer: COMMERCIAL

## 2021-12-09 VITALS
OXYGEN SATURATION: 98 % | DIASTOLIC BLOOD PRESSURE: 92 MMHG | TEMPERATURE: 98.2 F | SYSTOLIC BLOOD PRESSURE: 137 MMHG | HEART RATE: 95 BPM

## 2021-12-09 DIAGNOSIS — M79.645 PAIN OF FINGER OF LEFT HAND: Primary | ICD-10-CM

## 2021-12-09 PROCEDURE — 99213 OFFICE O/P EST LOW 20 MIN: CPT | Performed by: PHYSICIAN ASSISTANT

## 2021-12-09 RX ORDER — IBUPROFEN 600 MG/1
600 TABLET, FILM COATED ORAL EVERY 6 HOURS PRN
COMMUNITY
End: 2022-02-03

## 2021-12-09 NOTE — PROGRESS NOTES
Patient presents with:  Finger: left middle finger pain, possible injury x 4 days       Clinical Decision Making:  I had a conversation with the patient stating that there is no bony abnormality seen on x-ray primarily to include volar plate injury.  Patient will start with early range of motion and tincture of time and monitor his symptoms.  Close follow-up with hand surgery if complication or sequela should arise.  Questions were answered patient satisfaction before discharge      ICD-10-CM    1. Pain of finger of left hand  M79.645 XR Finger Left G/E 2 Views       Patient Instructions   Ice topically to the area 20 minutes on each hour while awake.  Take precautions to avoid damage to the skin.  After 2 days, transition to ice topically 20 minutes 3 times per day.  After 2 days may add heat and alternate ice and heat.  Ibuprofen 600 mg (3 tablets) 3 times a day with food for analgesia and anti-inflammatory effect.  Do not use the ibuprofen if you have contraindications to using NSAIDs.  Injuries to the extremities may be elevated above level of the heart continuously for the first 2 days as much as possible.          HPI:  KunGIOVANI Leigh is a 41 year old male who presents today for insidious onset of left long finger PIP joint pain.  Patient has not had injury break in the skin bleeding ecchymoses or joint effusion.  No involvement of the other joints of the left long finger to include the DIP or MCP joint.  Pacifically he does have a history of diabetes but no hypothyroidism but no locking or catching or other early signs of trigger finger.  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  Patient has not tried treatment for this at home.  No other joints are involved.    History obtained from chart review and the patient.    Problem List:  2021-10: Diabetes mellitus, type 2 (H)  2021-10: Morbid obesity (H)  2020-10: Soft tissue  abscess of inguinal region      Past Medical History:   Diagnosis Date     Hypertension      Obesity        Social History     Tobacco Use     Smoking status: Never Smoker     Smokeless tobacco: Never Used   Substance Use Topics     Alcohol use: Not Currently       Review of Systems    Vitals:    12/09/21 1725   BP: (!) 137/92   Pulse: 95   Temp: 98.2  F (36.8  C)   TempSrc: Tympanic   SpO2: 98%     General: Patient is resting comfortably no acute distress is afebrile  HEENT: Head is normocephalic atraumatic   eyes are PERRL EOMI sclera anicteric   Skin: Without rash non-diaphoretic  Musculoskeletal: Inspection of the left hand shows that there is no ecchymoses or edema.  Specifically the left long finger does not have any pain over the volar plate of the PIP joint.  No noted joint effusion  Patient is able make a good close tight fist  No locking or catching noticed.    Physical Exam    Labs:  Results for orders placed or performed in visit on 12/09/21   XR Finger Left G/E 2 Views     Status: None    Narrative    EXAM DATE:         12/09/2021    EXAM: X-RAY EXAM OF FINGER(S),LEFT,MINIMUM TWO VIEWS  LOCATION: [facility]  DATE/TIME: 12/9/2021 6:00 PM    INDICATION: Left long finger pip joint pain more on the volar plate and ulnar side  COMPARISON: None.    IMPRESSION: The left long finger is negative for fracture or dislocation.                 At the end of the encounter, I discussed results, diagnosis, medications. Discussed red flags for immediate return to clinic/ER, as well as indications for follow up if no improvement. Patient understood and agreed to plan. Patient was stable for discharge.

## 2021-12-10 NOTE — PATIENT INSTRUCTIONS
Ice topically to the area 20 minutes on each hour while awake.  Take precautions to avoid damage to the skin.  After 2 days, transition to ice topically 20 minutes 3 times per day.  After 2 days may add heat and alternate ice and heat.  Ibuprofen 600 mg (3 tablets) 3 times a day with food for analgesia and anti-inflammatory effect.  Do not use the ibuprofen if you have contraindications to using NSAIDs.  Injuries to the extremities may be elevated above level of the heart continuously for the first 2 days as much as possible.

## 2022-01-04 ENCOUNTER — OFFICE VISIT (OUTPATIENT)
Dept: FAMILY MEDICINE | Facility: CLINIC | Age: 42
End: 2022-01-04
Payer: COMMERCIAL

## 2022-01-04 VITALS
OXYGEN SATURATION: 96 % | TEMPERATURE: 98.1 F | DIASTOLIC BLOOD PRESSURE: 83 MMHG | RESPIRATION RATE: 18 BRPM | SYSTOLIC BLOOD PRESSURE: 140 MMHG | WEIGHT: 315 LBS | HEART RATE: 100 BPM | BODY MASS INDEX: 52.78 KG/M2

## 2022-01-04 DIAGNOSIS — L08.9 LOCALIZED INFECTION OF SKIN: Primary | ICD-10-CM

## 2022-01-04 PROCEDURE — 99213 OFFICE O/P EST LOW 20 MIN: CPT | Performed by: FAMILY MEDICINE

## 2022-01-04 RX ORDER — CEPHALEXIN 500 MG/1
500 TABLET ORAL 4 TIMES DAILY
Qty: 28 TABLET | Refills: 0 | Status: SHIPPED | OUTPATIENT
Start: 2022-01-04 | End: 2022-01-11

## 2022-01-04 NOTE — PATIENT INSTRUCTIONS
Take antibiotics as prescribed and use warm compress to the groin several times a day.  Follow-up if symptoms are getting worse or not improving.

## 2022-01-07 ENCOUNTER — APPOINTMENT (OUTPATIENT)
Dept: ULTRASOUND IMAGING | Facility: HOSPITAL | Age: 42
End: 2022-01-07
Attending: EMERGENCY MEDICINE
Payer: COMMERCIAL

## 2022-01-07 ENCOUNTER — HOSPITAL ENCOUNTER (EMERGENCY)
Facility: HOSPITAL | Age: 42
Discharge: HOME | End: 2022-01-07
Attending: STUDENT IN AN ORGANIZED HEALTH CARE EDUCATION/TRAINING PROGRAM | Admitting: STUDENT IN AN ORGANIZED HEALTH CARE EDUCATION/TRAINING PROGRAM
Payer: COMMERCIAL

## 2022-01-07 VITALS
BODY MASS INDEX: 42.66 KG/M2 | SYSTOLIC BLOOD PRESSURE: 120 MMHG | WEIGHT: 315 LBS | DIASTOLIC BLOOD PRESSURE: 77 MMHG | HEART RATE: 85 BPM | TEMPERATURE: 96.4 F | RESPIRATION RATE: 12 BRPM | HEIGHT: 72 IN | OXYGEN SATURATION: 97 %

## 2022-01-07 DIAGNOSIS — M79.9 SOFT TISSUE LESION: ICD-10-CM

## 2022-01-07 LAB
ALBUMIN UR-MCNC: 70 MG/DL
ANION GAP SERPL CALCULATED.3IONS-SCNC: 10 MMOL/L (ref 5–18)
APPEARANCE UR: CLEAR
BACTERIA #/AREA URNS HPF: ABNORMAL /HPF
BILIRUB UR QL STRIP: NEGATIVE
BUN SERPL-MCNC: 17 MG/DL (ref 8–22)
CALCIUM SERPL-MCNC: 10.1 MG/DL (ref 8.5–10.5)
CHLORIDE BLD-SCNC: 100 MMOL/L (ref 98–107)
CO2 SERPL-SCNC: 26 MMOL/L (ref 22–31)
COLOR UR AUTO: YELLOW
CREAT SERPL-MCNC: 1.11 MG/DL (ref 0.7–1.3)
ERYTHROCYTE [DISTWIDTH] IN BLOOD BY AUTOMATED COUNT: 12.5 % (ref 10–15)
GFR SERPL CREATININE-BSD FRML MDRD: 87 ML/MIN/1.73M2
GLUCOSE BLD-MCNC: 182 MG/DL (ref 70–125)
GLUCOSE UR STRIP-MCNC: 30 MG/DL
HCT VFR BLD AUTO: 47.9 % (ref 40–53)
HGB BLD-MCNC: 16.1 G/DL (ref 13.3–17.7)
HGB UR QL STRIP: NEGATIVE
HYALINE CASTS: 7 /LPF
KETONES UR STRIP-MCNC: 10 MG/DL
LEUKOCYTE ESTERASE UR QL STRIP: NEGATIVE
MCH RBC QN AUTO: 29.2 PG (ref 26.5–33)
MCHC RBC AUTO-ENTMCNC: 33.6 G/DL (ref 31.5–36.5)
MCV RBC AUTO: 87 FL (ref 78–100)
MUCOUS THREADS #/AREA URNS LPF: PRESENT /LPF
NITRATE UR QL: NEGATIVE
PH UR STRIP: 5.5 [PH] (ref 5–7)
PLATELET # BLD AUTO: 256 10E3/UL (ref 150–450)
POTASSIUM BLD-SCNC: 4.1 MMOL/L (ref 3.5–5)
RBC # BLD AUTO: 5.52 10E6/UL (ref 4.4–5.9)
RBC URINE: <1 /HPF
SODIUM SERPL-SCNC: 136 MMOL/L (ref 136–145)
SP GR UR STRIP: 1.03 (ref 1–1.03)
SQUAMOUS EPITHELIAL: 1 /HPF
UROBILINOGEN UR STRIP-MCNC: <2 MG/DL
WBC # BLD AUTO: 7.8 10E3/UL (ref 4–11)
WBC URINE: 1 /HPF

## 2022-01-07 PROCEDURE — 99284 EMERGENCY DEPT VISIT MOD MDM: CPT | Mod: 25

## 2022-01-07 PROCEDURE — 85027 COMPLETE CBC AUTOMATED: CPT | Performed by: EMERGENCY MEDICINE

## 2022-01-07 PROCEDURE — 36415 COLL VENOUS BLD VENIPUNCTURE: CPT | Performed by: EMERGENCY MEDICINE

## 2022-01-07 PROCEDURE — 82310 ASSAY OF CALCIUM: CPT | Performed by: EMERGENCY MEDICINE

## 2022-01-07 PROCEDURE — 81001 URINALYSIS AUTO W/SCOPE: CPT | Performed by: EMERGENCY MEDICINE

## 2022-01-07 PROCEDURE — 76870 US EXAM SCROTUM: CPT

## 2022-01-07 ASSESSMENT — MIFFLIN-ST. JEOR: SCORE: 2740.17

## 2022-01-08 NOTE — DISCHARGE INSTRUCTIONS
Your blood work today was overall reassuring.  No signs of infection.  The ultrasound showed a soft tissue lesion that does not look like an infection or an abscess.  I think it is reasonable to start you on a stronger antibiotic due to the history you have had with soft tissue infections.  Please take antibiotics as prescribed.    Return for any worsening redness, swelling, pain, or any other concerning symptoms.

## 2022-01-08 NOTE — ED PROVIDER NOTES
Emergency Department Encounter         FINAL IMPRESSION:  Soft tissue lesion        ED COURSE AND MEDICAL DECISION MAKING   7:46 PM I met the patient and performed my initial interview and exam.  9:11 PM I rechecked and updated the patient on lab and imaging results.       ED Course as of 01/07/22 2112 Fri Jan 07, 2022 1946 Patient is a morbidly obese diabetic hypertensive 41-year-old male history of scrotal abscess, admitted 2 years ago for scrotal cellulitis, here from home with 4 days of worsening scrotal pain and swelling concern for worsening abscess.  Was seen by  4 days ago and started on Keflex.  States is now gotten bigger.  No fevers.  No chills.  On arrival he is tachycardic.  Nontoxic-appearing.  Hypothermic.  Blood work pending out of triage.  Patient was not dressed in gown   1955 Physical examination revealing a fullness just superior to the left inguinal canal.  There is no overlying skin redness.  The area in question is firm, well-circumscribed and subcutaneous.  Does not appear to be a hernia as I can grab the entire lesion.  The lesion itself is not painful.  Patient able to squeeze it without significant pain.  I did a bedside ultrasound which showed some cobblestoning/septations in that area with no shanice pocket of purulence.  He has no signs of Lizzette's gangrene or scrotal cellulitis and patient reports that when he was admitted last time the redness was spreading down through his scrotum.  No testicular pain.  No testicular masses palpated on examination.  His abdomen is obese but benign.  No abdominal wall cellulitis.  Plan for labs formal ultrasound reevaluate     - ultrasound negative without abscess, labs normal, plan for augmentin and stop the keflex. Patient is reliable in regards to monitoring his symptoms              At the conclusion of the encounter I discussed the results of all the tests and the disposition. The questions were answered. The patient or family  acknowledged understanding and was agreeable with the care plan.                  MEDICATIONS GIVEN IN THE EMERGENCY DEPARTMENT:  Medications - No data to display    NEW PRESCRIPTIONS STARTED AT TODAY'S ED VISIT:  New Prescriptions    AMOXICILLIN-CLAVULANATE (AUGMENTIN) 875-125 MG TABLET    Take 1 tablet by mouth 2 times daily for 7 days       HPI     Patient information obtained from: patient    Use of Interpretor: N/A    Lm Leigh is a 41 year old male with a pertinent history of T2DM who presents to this ED by walk in for evaluation of an abscess.    Patient was admitted in 2019 for an inguinal abscess. He reports swelling in the same area that is not as severe as last time, but the pain feels similar. He noticed the abscess as a small spot on Tuesday, but reports that it has continued to swell since then. He describes a burning sensation upon squeezing the abscess. He also has feelings of nausea, but denies vomiting, fever, chills or other complaints at this time.       REVIEW OF SYSTEMS:  Review of Systems   Constitutional: Negative for fever, malaise  HEENT: Negative runny nose, sore throat, ear pain, neck pain  Respiratory: Negative for shortness of breath, cough, congestion  Cardiovascular: Negative for chest pain, leg edema  Gastrointestinal: Positive for nausea. Negative for abdominal distention, abdominal pain, constipation, vomiting,  diarrhea  Genitourinary: Negative for dysuria and hematuria.   Integument: Positive for inguinal abscess. Negative for rash, skin breakdown  Neurological: Negative for paresthesias, weakness, headache.  Musculoskeletal: Negative for joint pain, joint swelling      All other systems reviewed and are negative.          MEDICAL HISTORY     Past Medical History:   Diagnosis Date     Hypertension      Obesity        History reviewed. No pertinent surgical history.    Social History     Tobacco Use     Smoking status: Never Smoker     Smokeless tobacco: Never Used    Substance Use Topics     Alcohol use: Not Currently     Drug use: Not Currently       amoxicillin-clavulanate (AUGMENTIN) 875-125 MG tablet  amLODIPine (NORVASC) 10 MG tablet  aspirin (ASA) 81 MG EC tablet  BD PEN NEEDLE ZION 2ND GEN 32G X 4 MM miscellaneous  blood glucose (NO BRAND SPECIFIED) lancets standard  blood glucose (PRECISION QID TEST) test strip  cephalexin 500 MG tablet  ibuprofen (ADVIL/MOTRIN) 600 MG tablet  insulin pen needle (ULTIGUARD SAFEPACK PEN NEEDLE) 32G X 4 MM miscellaneous  LANTUS SOLOSTAR 100 UNIT/ML soln  losartan (COZAAR) 100 MG tablet  metFORMIN (GLUCOPHAGE-XR) 500 MG 24 hr tablet  metoprolol tartrate (LOPRESSOR) 100 MG tablet            PHYSICAL EXAM     BP (!) 185/87   Pulse 91   Temp (!) 96.4  F (35.8  C)   Resp 12   Ht 1.829 m (6')   Wt (!) 178.7 kg (394 lb)   SpO2 99%   BMI 53.44 kg/m        PHYSICAL EXAM:     General: Patient appears well, nontoxic, comfortable  HEENT: Moist mucous membranes, no tongue swelling.  No head trauma.  No midline neck pain.  Cardiovascular: Normal rate, normal rhythm, no extremity edema.  No appreciable murmur.  Respiratory: No signs of respiratory distress, lungs are clear to auscultation bilaterally with no wheezes rhonchi or rales.  Abdominal: Soft, nontender, nondistended, no palpable masses, no guarding, no rebound  Musculoskeletal: Full range of motion of joints, no deformities appreciated.  : patient with a mobile soft tissue golf ball size mass just superior to the L inguinal canal, no overlying redness, no pain to palp, no purulence, no testicular pain, no scrotal edema or signs of  Lizzette's gangrene.   Neurological: Alert and oriented, grossly neurologically intact.  Psychological: Normal affect and mood.  Integument: No rashes appreciated          RESULTS       Labs Ordered and Resulted from Time of ED Arrival to Time of ED Departure   BASIC METABOLIC PANEL - Abnormal       Result Value    Sodium 136      Potassium 4.1       Chloride 100      Carbon Dioxide (CO2) 26      Anion Gap 10      Urea Nitrogen 17      Creatinine 1.11      Calcium 10.1      Glucose 182 (*)     GFR Estimate 87     ROUTINE UA WITH MICROSCOPIC REFLEX TO CULTURE - Abnormal    Color Urine Yellow      Appearance Urine Clear      Glucose Urine 30  (*)     Bilirubin Urine Negative      Ketones Urine 10  (*)     Specific Gravity Urine 1.028      Blood Urine Negative      pH Urine 5.5      Protein Albumin Urine 70  (*)     Urobilinogen Urine <2.0      Nitrite Urine Negative      Leukocyte Esterase Urine Negative      Bacteria Urine Few (*)     Mucus Urine Present (*)     RBC Urine <1      WBC Urine 1      Squamous Epithelials Urine 1      Hyaline Casts Urine 7 (*)    CBC WITH PLATELETS - Normal    WBC Count 7.8      RBC Count 5.52      Hemoglobin 16.1      Hematocrit 47.9      MCV 87      MCH 29.2      MCHC 33.6      RDW 12.5      Platelet Count 256         US Testicular & Scrotum w Doppler Ltd   Final Result   IMPRESSION:      1.  Normal-sized testes with normal intratesticular blood flow.   2.  Small left epididymal head cyst.   3.  Localized edema within the deep subcutaneous tissues in the left groin but no focal lesion or fluid collection is present.             PROCEDURES:  Procedures:  Procedures       I, Akbar Bermeo am serving as a scribe to document services personally performed by Jeremiah Pinon DO, based on my observations and the provider's statements to me.  I, Jeremiah Pinon DO, attest that Akbar Bermeo is acting in a scribe capacity, has observed my performance of the services and has documented them in accordance with my direction.    Jeremiah Pinon DO  Emergency Medicine  [facility] EMERGENCY DEPARTMENT     Jeremiah Pinon DO  01/07/22 1003

## 2022-01-08 NOTE — ED PROVIDER NOTES
ED Provider In Triage Note  [facility]  Encounter Date: Jan 7, 2022    Chief Complaint   Patient presents with     Groin Pain       Brief HPI:   Lm Leigh is a 41 year old male presenting to the Emergency Department with a chief complaint of possible groin abscess.  Was seen at urgent care several days ago and was started on Keflex, but pain and swelling is increasing.  Hx of similar findings in the past.  No fever.  Mild nausea.     Brief Physical Exam:  BP (!) 186/87   Pulse 104   Temp (!) 96.4  F (35.8  C)   Resp 12   Ht 1.829 m (6')   Wt (!) 178.7 kg (394 lb)   SpO2 97%   BMI 53.44 kg/m    General: Non-toxic appearing  HEENT: Atraumatic  Resp: No respiratory distress  Abdomen: Non-peritoneal  Neuro: Alert, oriented, answers questions appropriately  Psych: Behavior appropriate      Plan Initiated in Triage:  Orders Placed This Encounter     CBC with platelets     Basic metabolic panel     UA with Microscopic reflex to Culture           PIT Dispo:   Return to lobby while awaiting workup and ED bed availability    Bipin Perdue MD on 1/7/2022 at 6:12 PM    Patient was evaluated by the Physician in Triage due to a limitation of available rooms in the Emergency Department. A plan of care was discussed based on the information obtained on the initial evaluation and patient was consuled to return back to the Emergency Department lobby after this initial evalutaiton until results were obtained or a room became available in the Emergency Department. Patient was counseled not to leave prior to receiving the results of their workup.      Bipin Perdue MD  01/07/22 [id number]

## 2022-01-08 NOTE — ED TRIAGE NOTES
Pt developed a painful abcess to the left of his testicle-he went to urgent care and was started on antibiotics on Tuesday. Despite the antibiotics, the lump has gotten worse and it is more painful. He has had an abcess in the same area before and was hospitalized for iv antibiotics.

## 2022-01-08 NOTE — PROGRESS NOTES
Assessment:       Localized infection of skin    - cephalexin 500 MG tablet  Dispense: 28 tablet; Refill: 0         Plan:     Patient has what appears to be a localized cutaneous skin infection. Does not seem to be hernia given that it is well-circumscribed and seems to be subcutaneous. It is mildly firm without fluctuance. Is not extend into the scrotum and there is no overlying skin redness or evidence of gangrene or other cellulitis. Will start on oral cephalexin and recommend heat. Follow-up if symptoms getting worse or not improving.    MEDICATIONS:   Orders Placed This Encounter   Medications     cephalexin 500 MG tablet     Sig: Take 500 mg by mouth 4 times daily for 7 days     Dispense:  28 tablet     Refill:  0         Patient Instructions   Take antibiotics as prescribed and use warm compress to the groin several times a day.  Follow-up if symptoms are getting worse or not improving.        Patient Instructions   Take antibiotics as prescribed and use warm compress to the groin several times a day.  Follow-up if symptoms are getting worse or not improving.        Subjective:       41 year old male type II diabetic presents for evaluation of an area of swelling in his left groin. He has had history of groin infection/cellulitis on the right side and is concerned about a possible infection again. He feels like it is gotten significantly larger over the past 24 hours. He has not noticed any scrotal swelling or redness and has not had any fevers or chills. He is otherwise been feeling well. The lesion is only been mildly tender. He denies any injury.    Patient Active Problem List   Diagnosis     Soft tissue abscess of inguinal region     Diabetes mellitus, type 2 (H)     Morbid obesity (H)       Past Medical History:   Diagnosis Date     Hypertension      Obesity        No past surgical history on file.    Current Outpatient Medications   Medication     amLODIPine (NORVASC) 10 MG tablet     aspirin (ASA) 81  MG EC tablet     BD PEN NEEDLE ZION 2ND GEN 32G X 4 MM miscellaneous     blood glucose (NO BRAND SPECIFIED) lancets standard     blood glucose (PRECISION QID TEST) test strip     cephalexin 500 MG tablet     ibuprofen (ADVIL/MOTRIN) 600 MG tablet     insulin pen needle (ULTIGUARD SAFEPACK PEN NEEDLE) 32G X 4 MM miscellaneous     LANTUS SOLOSTAR 100 UNIT/ML soln     losartan (COZAAR) 100 MG tablet     metFORMIN (GLUCOPHAGE-XR) 500 MG 24 hr tablet     metoprolol tartrate (LOPRESSOR) 100 MG tablet     amoxicillin-clavulanate (AUGMENTIN) 875-125 MG tablet     No current facility-administered medications for this visit.       Allergies   Allergen Reactions     Shellfish Allergy Anaphylaxis     Lisinopril Cough     Other Food Allergy Hives     Blue berries       No family history on file.    Social History     Socioeconomic History     Marital status: Single     Spouse name: None     Number of children: None     Years of education: None     Highest education level: None   Occupational History     None   Tobacco Use     Smoking status: Never Smoker     Smokeless tobacco: Never Used   Substance and Sexual Activity     Alcohol use: Not Currently     Drug use: Not Currently     Sexual activity: None   Other Topics Concern     None   Social History Narrative     None     Social Determinants of Health     Financial Resource Strain: Not on file   Food Insecurity: Not on file   Transportation Needs: Not on file   Physical Activity: Not on file   Stress: Not on file   Social Connections: Not on file   Intimate Partner Violence: Not on file   Housing Stability: Not on file         Review of Systems  Pertinent items are noted in HPI.      Objective:     BP (!) 140/83   Pulse 100   Temp 98.1  F (36.7  C) (Oral)   Resp 18   Wt (!) 179 kg (394 lb 9.6 oz)   SpO2 96%   BMI 52.78 kg/m       General appearance: alert, appears stated age and cooperative  Male genitalia: Patient noted to have a well-circumscribed movable and firm  subcutaneous lesion in the left groin without overlying erythema or warmth. There is no swelling or erythema extending down at all into the scrotum. No evidence of gangrene. No penile lesions. No scrotal swelling. No testicular tenderness or swelling. There is no fluctuance to the lesion. It measures approximately 2 cm in diameter.         This note has been dictated using voice recognition software. Any grammatical or context distortions are unintentional and inherent to the software

## 2022-01-13 ENCOUNTER — OFFICE VISIT (OUTPATIENT)
Dept: FAMILY MEDICINE | Facility: CLINIC | Age: 42
End: 2022-01-13
Payer: COMMERCIAL

## 2022-01-13 VITALS
WEIGHT: 315 LBS | DIASTOLIC BLOOD PRESSURE: 86 MMHG | SYSTOLIC BLOOD PRESSURE: 144 MMHG | HEART RATE: 77 BPM | BODY MASS INDEX: 54.41 KG/M2 | OXYGEN SATURATION: 98 %

## 2022-01-13 DIAGNOSIS — L02.214 ABSCESS, GROIN: Primary | ICD-10-CM

## 2022-01-13 DIAGNOSIS — E11.628 TYPE 2 DIABETES MELLITUS WITH OTHER SKIN COMPLICATION, WITH LONG-TERM CURRENT USE OF INSULIN (H): ICD-10-CM

## 2022-01-13 DIAGNOSIS — Z79.4 TYPE 2 DIABETES MELLITUS WITH OTHER SKIN COMPLICATION, WITH LONG-TERM CURRENT USE OF INSULIN (H): ICD-10-CM

## 2022-01-13 LAB
ERYTHROCYTE [DISTWIDTH] IN BLOOD BY AUTOMATED COUNT: 12.1 % (ref 10–15)
HBA1C MFR BLD: 10.1 % (ref 0–5.6)
HCT VFR BLD AUTO: 43.5 % (ref 40–53)
HGB BLD-MCNC: 15.4 G/DL (ref 13.3–17.7)
HOLD SPECIMEN: NORMAL
MCH RBC QN AUTO: 29.8 PG (ref 26.5–33)
MCHC RBC AUTO-ENTMCNC: 35.4 G/DL (ref 31.5–36.5)
MCV RBC AUTO: 84 FL (ref 78–100)
PLATELET # BLD AUTO: 200 10E3/UL (ref 150–450)
RBC # BLD AUTO: 5.16 10E6/UL (ref 4.4–5.9)
WBC # BLD AUTO: 5.4 10E3/UL (ref 4–11)

## 2022-01-13 PROCEDURE — 99214 OFFICE O/P EST MOD 30 MIN: CPT | Performed by: STUDENT IN AN ORGANIZED HEALTH CARE EDUCATION/TRAINING PROGRAM

## 2022-01-13 PROCEDURE — 83036 HEMOGLOBIN GLYCOSYLATED A1C: CPT | Performed by: STUDENT IN AN ORGANIZED HEALTH CARE EDUCATION/TRAINING PROGRAM

## 2022-01-13 PROCEDURE — 85027 COMPLETE CBC AUTOMATED: CPT | Performed by: STUDENT IN AN ORGANIZED HEALTH CARE EDUCATION/TRAINING PROGRAM

## 2022-01-13 PROCEDURE — 36415 COLL VENOUS BLD VENIPUNCTURE: CPT | Performed by: STUDENT IN AN ORGANIZED HEALTH CARE EDUCATION/TRAINING PROGRAM

## 2022-01-13 RX ORDER — SULFAMETHOXAZOLE/TRIMETHOPRIM 800-160 MG
1 TABLET ORAL 2 TIMES DAILY
Qty: 20 TABLET | Refills: 0 | Status: SHIPPED | OUTPATIENT
Start: 2022-01-13 | End: 2022-01-23

## 2022-01-13 NOTE — ADDENDUM NOTE
Addended by: RAMBO DE JESUS on: 1/13/2022 01:43 PM     Modules accepted: Orders    
Detail Level: Detailed
Size Of Lesion In Cm (Optional): 0

## 2022-01-13 NOTE — RESULT ENCOUNTER NOTE
I called and spoke with the patient about their recent clinic visit results. I answered any questions they had. Worsening A1C. I recommended coming in for an appointment as soon as possible with PCP or me in two weeks to discuss diabetes.    Dr. Nick Reyes

## 2022-01-13 NOTE — PROGRESS NOTES
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  Seen by several providers before myself and placed on various antibiotics.  Today overall the lesion appears improved as compared to previous descriptions as was patient's own history.  Point-of-care ultrasound does show some cobblestoning still however possibly an enlarged lymph node.  For now I recommended switching antibiotics to Bactrim as well as obtaining a CBC and rechecking his A1c as I am concerned his diabetes may be out of control and affecting his ability to recover from this abscess.    1. Abscess, groin  - sulfamethoxazole-trimethoprim (BACTRIM DS) 800-160 MG tablet; Take 1 tablet by mouth 2 times daily for 10 days  Dispense: 20 tablet; Refill: 0  - Hemoglobin A1c; Future  - CBC with platelets; Future    2. Type 2 diabetes mellitus with other skin complication, with long-term current use of insulin (H)    Follow up: 2 weeks for recheck  Options for treatment and follow-up care were reviewed with the patient and/or guardian. Lm Leigh and/or guardian engaged in the decision making process and verbalized understanding of the options discussed and agreed with the final plan.    Dr. Nick Reyes         HPI:   Lm Leigh is a 41 year old  male who presents for:    Chief Complaint   Patient presents with     Mass     lump in testicles/groin on left side. lump has been there since about jan 4th. a little painful. spot on skin that is tender to touch.      Patient tells me he started having a small bump to the left of his testicles that enlarged. Going on the since the 4th of January. He saw a doctor in urgent care who was worried about an abscess and put him on keflex. Was not improving after a couple of days and went to ER who did an ultrasound showing  area of edema but not necessarily an abscess. Placed on keflex.  Since then he has felt that it is improving overall with no longer having pain, swelling, or redness in the area.  Still a firm area there though.  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  Asking more about his history he does have small cysts/abscesses like this in his groin area fairly frequently.  Seem to be more frequent in the last couple months.  He has not had an A1c check since October.  He has diabetes type 2 currently on insulin.         PMHX:     Patient Active Problem List   Diagnosis     Soft tissue abscess of inguinal region     Diabetes mellitus, type 2 (H)     Morbid obesity (H)       Current Outpatient Medications   Medication Sig Dispense Refill     amLODIPine (NORVASC) 10 MG tablet Take 10 mg by mouth daily       amoxicillin-clavulanate (AUGMENTIN) 875-125 MG tablet Take 1 tablet by mouth 2 times daily for 7 days 14 tablet 0     aspirin (ASA) 81 MG EC tablet Take 81 mg by mouth       BD PEN NEEDLE ZION 2ND GEN 32G X 4 MM miscellaneous USE TO INJECT INSULIN ONCE DAILY       blood glucose (NO BRAND SPECIFIED) lancets standard Use to check blood sugars three times daily. Dispense brand per patient's insurance at pharmacy discretion.       blood glucose (PRECISION QID TEST) test strip Use to check blood sugars three times daily. Strips compatible with [employer] guide. Dispense brand per patient's insurance at pharmacy discretion.       ibuprofen (ADVIL/MOTRIN) 600 MG tablet Take 600 mg by mouth every 6 hours as needed for moderate pain       insulin pen needle (JURGENTIGLARRY SAFEPACK PEN NEEDLE) 32G X 4 MM miscellaneous Use to inject insulin once daily       LANTUS SOLOSTAR 100 UNIT/ML soln INJECT 40 UNITS UNDER THE SKIN AT BEDTIME. 15 mL 1     losartan (COZAAR) 100 MG tablet Take 100 mg by mouth daily       metFORMIN (GLUCOPHAGE-XR) 500 MG 24 hr tablet Take 2 tablets (1,000 mg) by mouth 2 times  daily (with meals) 360 tablet 3     metoprolol tartrate (LOPRESSOR) 100 MG tablet Take 1 tablet (100 mg) by mouth 2 times daily 180 tablet 3       Social History     Tobacco Use     Smoking status: Never Smoker     Smokeless tobacco: Never Used   Substance Use Topics     Alcohol use: Not Currently     Drug use: Not Currently       Social History     Social History Narrative     Not on file       Allergies   Allergen Reactions     Shellfish Allergy Anaphylaxis     Lisinopril Cough     Other Food Allergy Hives     Blue berries       No results found for this or any previous visit (from the past 24 hour(s)).         Review of Systems:    ROS: 10 point ROS neg other than the symptoms noted above in the HPI.         Physical Exam:     Vitals:    01/13/22 1300   BP: (!) 144/86   BP Location: Right arm   Patient Position: Sitting   Cuff Size: Adult Large   Pulse: 77   SpO2: 98%   Weight: (!) 182 kg (401 lb 3.2 oz)     Body mass index is 54.41 kg/m .    General appearance: Alert, cooperative, no distress, appears stated age, obese  Head: Normocephalic, atraumatic, without obvious abnormality  Eyes: Pupils equal round, reactive.  Conjunctiva clear.  Nose: Nares normal, no drainage.  Throat: Lips, mucosa, tongue normal mucosa pink and moist  Skin: Skin color, texture, turgor normal no rashes or lesions on limited skin exam  Uro: Multiple red cystic-like lesions over patient's lower abdomen and into his groin.  Area of concern is not reddened but does have a small red papule and almost appears like a drainage tract though not unable to express pus from this.  Firm to palpation though not tender.  Point-of-care ultrasound shows some hypoechoic cobblestoning like tissue with a specific hyperechoic approximately 1 cm nodule.  Possibly a lymph node.

## 2022-01-27 ENCOUNTER — OFFICE VISIT (OUTPATIENT)
Dept: FAMILY MEDICINE | Facility: CLINIC | Age: 42
End: 2022-01-27
Payer: COMMERCIAL

## 2022-01-27 VITALS
SYSTOLIC BLOOD PRESSURE: 142 MMHG | WEIGHT: 315 LBS | DIASTOLIC BLOOD PRESSURE: 78 MMHG | HEART RATE: 77 BPM | OXYGEN SATURATION: 98 % | BODY MASS INDEX: 53.08 KG/M2

## 2022-01-27 DIAGNOSIS — Z79.4 TYPE 2 DIABETES MELLITUS WITH OTHER SKIN COMPLICATION, WITH LONG-TERM CURRENT USE OF INSULIN (H): Primary | ICD-10-CM

## 2022-01-27 DIAGNOSIS — L02.214 ABSCESS, GROIN: ICD-10-CM

## 2022-01-27 DIAGNOSIS — E11.628 TYPE 2 DIABETES MELLITUS WITH OTHER SKIN COMPLICATION, WITH LONG-TERM CURRENT USE OF INSULIN (H): Primary | ICD-10-CM

## 2022-01-27 PROBLEM — S76.399A: Status: ACTIVE | Noted: 2018-08-13

## 2022-01-27 PROBLEM — N49.2 CELLULITIS OF SCROTUM: Status: ACTIVE | Noted: 2019-12-27

## 2022-01-27 PROBLEM — I10 ESSENTIAL HYPERTENSION: Status: ACTIVE | Noted: 2019-12-27

## 2022-01-27 PROBLEM — R80.9 MICROALBUMINURIA: Status: ACTIVE | Noted: 2021-02-26

## 2022-01-27 PROCEDURE — 99213 OFFICE O/P EST LOW 20 MIN: CPT | Performed by: STUDENT IN AN ORGANIZED HEALTH CARE EDUCATION/TRAINING PROGRAM

## 2022-01-27 NOTE — PROGRESS NOTES
Assessment and Plan     41-year-old male with relevant past medical history of obesity and previous uncontrolled type 2 diabetes on insulin.  Presents in follow-up for abscess in his groin which has improved with Bactrim oral antibiotics.  I recommended watchful waiting at this point.  I encourage patient continue with better control of his diabetes through oral means.  He continues to take his insulin and metformin as prescribed.  Has a follow-up with his primary care provider next week.  I suggested he return in 3 months to have a repeat A1c.  Patient does have concerned that his insulin is becoming too expensive lately.  I will send referral to [facility] pharmacist to assist him in this.    1. Type 2 diabetes mellitus with other skin complication, with long-term current use of insulin  - Med Therapy Management Referral    2. Abscess, groin    Follow up: PRN  Options for treatment and follow-up care were reviewed with the patient and/or guardian. Lm Leigh and/or guardian engaged in the decision making process and verbalized understanding of the options discussed and agreed with the final plan.    Dr. Nick Reyes         HPI:   Lm Leigh is a 41 year old  male who presents for:    Chief Complaint   Patient presents with     follow up     follow from lump in testicle. pt said it seems to be getting better. brought blood sugar numbers.      Last saw patient on 1/13/2022.  At that time patient was following up about an abscess in his groin that several providers had seen him at this clinic previous to myself.  Treated with several antibiotics.  When I saw him lesion appeared improved but still saw cobblestoning-like appearance on point-of-care ultrasound so I placed him on Bactrim for 10-day course.  I also had long discussion with the patient about improving his diabetic control as last A1c showed his diabetes to be uncontrolled at 10.    Today patient tells me the wound has improved additionally and is  almost resolved.  Still a semihard area but no draining or redness in the area.  Not painful to palpation.  He is worked hard to get his diabetes under better control and has changed his diet significantly.  He shows me his blood sugars which are around 130 fasting and 1 40-1 70 during the day.  This is significantly improved from previous when they were in the 200s consistently.         PMHX:     Patient Active Problem List   Diagnosis     Soft tissue abscess of inguinal region     Diabetes mellitus, type 2 (H)     Morbid obesity (H)     Cellulitis of scrotum     Essential hypertension     Microalbuminuria     Other specified injury of muscle, fascia and tendon of the posterior muscle group at thigh level, unspecified thigh, initial encounter     Sprain, hamstring       Current Outpatient Medications   Medication Sig Dispense Refill     amLODIPine (NORVASC) 10 MG tablet Take 10 mg by mouth daily       aspirin (ASA) 81 MG EC tablet Take 81 mg by mouth       BD PEN NEEDLE ZION 2ND GEN 32G X 4 MM miscellaneous USE TO INJECT INSULIN ONCE DAILY       blood glucose (NO BRAND SPECIFIED) lancets standard Use to check blood sugars three times daily. Dispense brand per patient's insurance at pharmacy discretion.       blood glucose (PRECISION QID TEST) test strip Use to check blood sugars three times daily. Strips compatible with [employer] guide. Dispense brand per patient's insurance at pharmacy discretion.       ibuprofen (ADVIL/MOTRIN) 600 MG tablet Take 600 mg by mouth every 6 hours as needed for moderate pain       insulin pen needle (ULTIGUARD SAFEPACK PEN NEEDLE) 32G X 4 MM miscellaneous Use to inject insulin once daily       LANTUS SOLOSTAR 100 UNIT/ML soln INJECT 40 UNITS UNDER THE SKIN AT BEDTIME. 15 mL 1     losartan (COZAAR) 100 MG tablet Take 100 mg by mouth daily       metFORMIN (GLUCOPHAGE-XR) 500 MG 24 hr tablet Take 2 tablets (1,000 mg) by mouth 2 times daily (with meals) 360 tablet 3     metoprolol tartrate  (LOPRESSOR) 100 MG tablet Take 1 tablet (100 mg) by mouth 2 times daily 180 tablet 3       Social History     Tobacco Use     Smoking status: Never Smoker     Smokeless tobacco: Never Used   Substance Use Topics     Alcohol use: Not Currently     Drug use: Not Currently       Social History     Social History Narrative     Not on file       Allergies   Allergen Reactions     Shellfish Allergy Anaphylaxis     Lisinopril Cough     Other Food Allergy Hives     Blue berries       No results found for this or any previous visit (from the past 24 hour(s)).         Review of Systems:    ROS: 10 point ROS neg other than the symptoms noted above in the HPI.         Physical Exam:     Vitals:    01/27/22 1119   BP: (!) 142/78   BP Location: Right arm   Patient Position: Sitting   Cuff Size: Adult Large   Pulse: 77   SpO2: 98%   Weight: (!) 177.5 kg (391 lb 6.4 oz)     Body mass index is 53.08 kg/m .    General appearance: Alert, cooperative, no distress, appears stated age, obese  Head: Normocephalic, atraumatic, without obvious abnormality  Eyes: Pupils equal round, reactive.  Conjunctiva clear.  Nose: Nares normal, no drainage.  Throat: Lips, mucosa, tongue normal mucosa pink and moist  Neck: Supple, symmetric, trachea midline  Lungs: Clear to auscultation bilaterally, no wheezing or crackles present.  Respirations unlabored  Heart: Regular rate and rhythm, normal S1 and S2, no murmur, rub or gallop.  Skin: approximately 1 cm indurated area in patient's left groin that is not red or tender to palpation, no drainage  Neurologic: CN II through XII intact, normal strength.

## 2022-01-30 DIAGNOSIS — I10 ESSENTIAL (PRIMARY) HYPERTENSION: ICD-10-CM

## 2022-01-30 DIAGNOSIS — E11.65 TYPE 2 DIABETES MELLITUS WITH HYPERGLYCEMIA, WITHOUT LONG-TERM CURRENT USE OF INSULIN (H): ICD-10-CM

## 2022-01-31 ENCOUNTER — TELEPHONE (OUTPATIENT)
Dept: FAMILY MEDICINE | Facility: CLINIC | Age: 42
End: 2022-01-31
Payer: COMMERCIAL

## 2022-01-31 NOTE — TELEPHONE ENCOUNTER
MTM referral from: [facility] visit (referral by provider)    MTM referral outreach attempt #2 on January 31, 2022 at 10:46 AM      Outcome: Patient not reachable after several attempts, will route to MTM Pharmacist/Provider as an FYI.  MT scheduling number is [phone].  Thank you for the referral.    Wm Boyle, MTM coordinator

## 2022-02-01 RX ORDER — INSULIN GLARGINE 100 [IU]/ML
INJECTION, SOLUTION SUBCUTANEOUS
Qty: 15 ML | Refills: 1 | Status: SHIPPED | OUTPATIENT
Start: 2022-02-01 | End: 2022-02-03

## 2022-02-01 RX ORDER — LOSARTAN POTASSIUM 100 MG/1
TABLET ORAL
Qty: 90 TABLET | Refills: 3 | Status: SHIPPED | OUTPATIENT
Start: 2022-02-01 | End: 2022-12-29

## 2022-02-01 RX ORDER — AMLODIPINE BESYLATE 10 MG/1
TABLET ORAL
Qty: 90 TABLET | Refills: 3 | Status: SHIPPED | OUTPATIENT
Start: 2022-02-01 | End: 2023-04-06

## 2022-02-01 NOTE — TELEPHONE ENCOUNTER
"Routing refill request to provider for review/approval because:  PCP please review for refill.     Last Written Prescription Date:  11/29/2021  Last Fill Quantity: 15mL,  # refills: 1   Last office visit provider:  1/27/2022     Requested Prescriptions   Pending Prescriptions Disp Refills     LANTUS SOLOSTAR 100 UNIT/ML soln [Pharmacy Med Name: LANTUS SOLOSTAR 100 UNIT/ML] 15 mL 1     Sig: INJECT 40 UNITS UNDER THE SKIN AT BEDTIME.       Long Acting Insulin Protocol Passed - 1/30/2022  9:14 AM        Passed - Serum creatinine on file in past 12 months     Recent Labs   Lab Test 01/07/22  1935   CR 1.11       Ok to refill medication if creatinine is low          Passed - HgbA1C in past 3 or 6 months     If HgbA1C is 8 or greater, it needs to be on file within the past 3 months.  If less than 8, must be on file within the past 6 months.     Recent Labs   Lab Test 01/13/22  1342   A1C 10.1*             Passed - Medication is active on med list        Passed - Patient is age 18 or older        Passed - Recent (6 mo) or future (30 days) visit within the authorizing provider's specialty     Patient had office visit in the last 6 months or has a visit in the next 30 days with authorizing provider or within the authorizing provider's specialty.  See \"Patient Info\" tab in inbasket, or \"Choose Columns\" in Meds & Orders section of the refill encounter.            Routing refill request to provider for review/approval because:  Blood pressure  Refill too soon.     Last Written Prescription Date:  3/23/2021  Last Fill Quantity: 90,  # refills: 3        amLODIPine (NORVASC) 10 MG tablet [Pharmacy Med Name: AMLODIPINE BESYLATE 10 MG TAB] 90 tablet 3     Sig: TAKE 1 TABLET BY MOUTH EVERY DAY. INCREASED DOSE       Calcium Channel Blockers Protocol  Failed - 1/30/2022  9:14 AM        Failed - Blood pressure under 140/90 in past 12 months     BP Readings from Last 3 Encounters:   01/27/22 (!) 142/78   01/13/22 (!) 144/86   01/07/22 " "120/77                 Passed - Recent (12 mo) or future (30 days) visit within the authorizing provider's specialty     Patient has had an office visit with the authorizing provider or a provider within the authorizing providers department within the previous 12 mos or has a future within next 30 days. See \"Patient Info\" tab in inbasket, or \"Choose Columns\" in Meds & Orders section of the refill encounter.              Passed - Medication is active on med list        Passed - Patient is age 18 or older        Passed - Normal serum creatinine on file in past 12 months     Recent Labs   Lab Test 01/07/22  1935   CR 1.11       Ok to refill medication if creatinine is low          Routing refill request to provider for review/approval because:  Blood pressure  Refill too soon.     Last Written Prescription Date:  2/26/2021  Last Fill Quantity: 90,  # refills: 3        losartan (COZAAR) 100 MG tablet [Pharmacy Med Name: LOSARTAN POTASSIUM 100 MG TAB] 90 tablet 3     Sig: TAKE 1 TABLET BY MOUTH EVERY DAY       Angiotensin-II Receptors Failed - 1/30/2022  9:14 AM        Failed - Last blood pressure under 140/90 in past 12 months     BP Readings from Last 3 Encounters:   01/27/22 (!) 142/78   01/13/22 (!) 144/86   01/07/22 120/77                 Passed - Recent (12 mo) or future (30 days) visit within the authorizing provider's specialty     Patient has had an office visit with the authorizing provider or a provider within the authorizing providers department within the previous 12 mos or has a future within next 30 days. See \"Patient Info\" tab in inbasket, or \"Choose Columns\" in Meds & Orders section of the refill encounter.              Passed - Medication is active on med list        Passed - Patient is age 18 or older        Passed - Normal serum creatinine on file in past 12 months     Recent Labs   Lab Test 01/07/22  1935   CR 1.11       Ok to refill medication if creatinine is low          Passed - Normal serum " potassium on file in past 12 months     Recent Labs   Lab Test 01/07/22  1935   POTASSIUM 4.1                         Elham Wren RN 01/31/22 7:36 PM

## 2022-02-02 NOTE — PROGRESS NOTES
Medication Therapy Management (MTM) Encounter    ASSESSMENT:                            Type 2 Diabetes: Last A1c not at goal less than 7%.  His home blood sugar readings are actually quite good and fasting a.m. readings are near goal  mg/dl.  I would anticipate that his next A1c will be improved.  Unclear why the pharmacy was unable to sob and alternative to Lantus, however I will send an order for Basaglar which hopefully will be covered for him.  We also did discussed the addition of a weekly GLP-1 agonist to help with weight loss --he was agreeable, and depending on the coverage we will start this after his next A1c in 2 months and hopefully decreased and eventually stop his insulin.  Okay to continue aspirin at this time.  Statin not indicated due to age.  Would recommend rechecking microalbumin with future A1c.    Hypertension: Blood pressure elevated today.  Weight loss should help, therefore will work on switching him to a weekly GLP-1 agonist.  He will be seeing Dr. Lindsey in 2 months.    Skin Infection: Improved.  I would agree that his symptoms likely will worsen with elevated blood sugars.    PLAN:                            1.  Rx for Basaglar sent -will verify coverage  2.  Rx for Ozempic sent -will verify coverage    Follow-up: After appointment with Dr. Lindsey 4/8/22    SUBJECTIVE/OBJECTIVE:                          Lm Leigh is a 41 year old male coming in for an initial visit. He was referred to me from Dr. Lindsey. I last met with patient Jan 2020.       Reason for visit: Concern over insulin price; patient was paying $100/month since insurance changed. Only has one Lantus pen left.   Medication Adherence/Access: Seeking cheaper alternative to Lantus.     Type 2 Diabetes:  Currently taking metformin  mg x2 (1000 mg) twice daily and Lantus 40 units daily.   No loose stools/diarrhea.   When we last met Jan 2020 (was newly diagnosed Dec 2019), we stopped Humalog and started  metformin.   Interested in a once weekly injection over daily insulin.     C peptide and glutamic acid decarboxylase checked 1/21/21  Has AccuChek luciano on his phone to record.    Tests BG 3-4 times daily before meals and HS. Blood sugars per log:   Fasting AM = 127, 135, 145, 138, 120, 110, 113  Afternoon = 151, 118, 103, 128, 130, 128, 132  Evening = 107, 116, 106, 123, 140, 128, 111  Last A1c checked 1/13/22 = 10.1% - had stuff going on which he attributes to the high A1c  Rarely has instances of Hypoglycemia.  Hyperglycemia symptoms: Tired  Using a AccuChek meter.   Microalbumin checked 12/23/20  Last GFR = 87 ml/min on 1/7/22  Is not taking a statin. Lipids checked 10/1/21   Is taking aspirin 81 mg for primary prevention.   Met with DME started Jan 2021, not seen recently.   Lab Results   Component Value Date    A1C 10.1 01/13/2022    A1C 7.8 10/01/2021    A1C 8.4 03/23/2021    A1C 10.9 12/23/2020    A1C 14.7 12/27/2019       Wt Readings from Last 2 Encounters:   01/27/22 (!) 391 lb 6.4 oz (177.5 kg)   01/13/22 (!) 401 lb 3.2 oz (182 kg)   The ASCVD Risk score (Ludlow DC Jr., et al., 2013) failed to calculate for the following reasons:    The 2013 ASCVD risk score is only valid for ages 40 to 79      Hypertension: Currently taking amlodipine 10 mg daily, losartan 100 mg daily and metoprolol tartrate 100 mg two times a day. Per chart review, losartan started Dec 2020. Patient does not monitor BP at home.  BP Readings from Last 3 Encounters:   02/03/22 (!) 147/82   01/27/22 (!) 142/78   01/13/22 (!) 144/86       Skin Infection: Given cephalexin on 1/4/22 due to swelling of left groin. Patient was then in ED on 1/7/22 due to worsening of scrotal abscess and given Augmentin. Seen by Dr. hernandez on 1/13/22 and given Bactrim. Patient had been hospitalized in Dec 2018 with scrotal cellulitis. After achieving glycemic control and finishing antibiotic therapy, symptoms have resolved.      Allergies/ADRs: Reviewed in  chart  Past Medical History: Reviewed in chart  Tobacco: He reports that he has never smoked. He has never used smokeless tobacco.  Alcohol: Reviewed in chart       ----------------  Post Discharge Medication Reconciliation Status: discharge medications reconciled and changed, per note/orders.    I spent 25 minutes with this patient today. All changes were made via collaborative practice agreement with James Aguilar MD. A copy of the visit note was provided to the patient's provider(s).    The patient declined a summary of these recommendations.     Rajni Hansen, Pharm.D., BCACP   Medication Therapy Management Pharmacist   [facility]        Medication Therapy Recommendations  Diabetes mellitus, type 2 (H)    Current Medication: LANTUS SOLOSTAR 100 UNIT/ML soln (Discontinued)   Rationale: More cost-effective medication available - Cost - Adherence   Recommendation: Change Medication - insulin glargine 100 UNIT/ML pen   Status: Accepted per CPA

## 2022-02-03 ENCOUNTER — OFFICE VISIT (OUTPATIENT)
Dept: PHARMACY | Facility: CLINIC | Age: 42
End: 2022-02-03
Attending: STUDENT IN AN ORGANIZED HEALTH CARE EDUCATION/TRAINING PROGRAM
Payer: COMMERCIAL

## 2022-02-03 VITALS — HEART RATE: 74 BPM | SYSTOLIC BLOOD PRESSURE: 147 MMHG | DIASTOLIC BLOOD PRESSURE: 82 MMHG

## 2022-02-03 DIAGNOSIS — Z79.4 TYPE 2 DIABETES MELLITUS WITH OTHER SKIN COMPLICATION, WITH LONG-TERM CURRENT USE OF INSULIN (H): Primary | ICD-10-CM

## 2022-02-03 DIAGNOSIS — N49.2 CELLULITIS OF SCROTUM: ICD-10-CM

## 2022-02-03 DIAGNOSIS — E11.628 TYPE 2 DIABETES MELLITUS WITH OTHER SKIN COMPLICATION, WITH LONG-TERM CURRENT USE OF INSULIN (H): Primary | ICD-10-CM

## 2022-02-03 DIAGNOSIS — I10 ESSENTIAL HYPERTENSION: ICD-10-CM

## 2022-02-03 PROCEDURE — 99607 MTMS BY PHARM ADDL 15 MIN: CPT | Performed by: PHARMACIST

## 2022-02-03 PROCEDURE — 99605 MTMS BY PHARM NP 15 MIN: CPT | Performed by: PHARMACIST

## 2022-02-03 RX ORDER — SEMAGLUTIDE 1.34 MG/ML
INJECTION, SOLUTION SUBCUTANEOUS
Qty: 1.5 ML | Refills: 0 | Status: SHIPPED | OUTPATIENT
Start: 2022-02-03 | End: 2022-02-03

## 2022-02-03 RX ORDER — INSULIN GLARGINE 100 [IU]/ML
40 INJECTION, SOLUTION SUBCUTANEOUS DAILY
Qty: 45 ML | Refills: 0 | Status: SHIPPED | OUTPATIENT
Start: 2022-02-03 | End: 2022-05-19

## 2022-02-14 ENCOUNTER — OFFICE VISIT (OUTPATIENT)
Dept: FAMILY MEDICINE | Facility: CLINIC | Age: 42
End: 2022-02-14
Payer: COMMERCIAL

## 2022-02-14 VITALS
OXYGEN SATURATION: 100 % | HEART RATE: 83 BPM | SYSTOLIC BLOOD PRESSURE: 147 MMHG | DIASTOLIC BLOOD PRESSURE: 83 MMHG | TEMPERATURE: 97.9 F | WEIGHT: 315 LBS | BODY MASS INDEX: 52.22 KG/M2

## 2022-02-14 DIAGNOSIS — H92.03 ACUTE EAR PAIN, BILATERAL: Primary | ICD-10-CM

## 2022-02-14 PROCEDURE — 99213 OFFICE O/P EST LOW 20 MIN: CPT | Performed by: PHYSICIAN ASSISTANT

## 2022-02-14 ASSESSMENT — ENCOUNTER SYMPTOMS
COUGH: 0
FEVER: 0
SORE THROAT: 0

## 2022-02-15 NOTE — PROGRESS NOTES
Patient presents with:  Ear Problem: bilateral ears pain x 5 days       Clinical Decision Making: Patient experiencing mild bilateral ear pain.  Ear exam is normal without any signs of otitis media, otitis externa, or cerumen impaction.  Patient is reassured by this.  He will consider starting Flonase nasal spray if his nasal congestion worsens.      ICD-10-CM    1. Acute ear pain, bilateral  H92.03        Patient Instructions   1. Recommend taking Flonase nasal spray if you are experiencing any nasal congestion. This helps open up your eustachian tube and helps decrease pressure behind her eardrums.  2. Your ear exam appears normal today. There is no significant amount of wax requiring the use of Debrox solution at this time.  3. Follow-up if symptoms fail to improve over the course the next 5 days.      HPI:  Lm Leigh is a 41 year old male who presents today complaining of bilateral ear pain for the past 5 days. Last week he had some sinus pain and pressure, but those symptoms have since resolved. He has not had any ear discharge. No recent swimming. He denies any fevers. He does want to have his ears checked out because they were bothering him little bit.    History obtained from the patient.    Problem List:  2021-10: Diabetes mellitus, type 2 (H)  2021-10: Morbid obesity (H)  2021-02: Microalbuminuria  2020-10: Soft tissue abscess of inguinal region  2019-12: Cellulitis of scrotum  2019-12: Essential hypertension  2018-08: Other specified injury of muscle, fascia and tendon of the   posterior muscle group at thigh level, unspecified thigh, initial   encounter  2018-08: Sprain, hamstring      Past Medical History:   Diagnosis Date     Hypertension      Obesity        Social History     Tobacco Use     Smoking status: Never Smoker     Smokeless tobacco: Never Used   Substance Use Topics     Alcohol use: Not Currently       Review of Systems   Constitutional: Negative for fever.   HENT: Positive for ear  pain. Negative for ear discharge and sore throat.    Respiratory: Negative for cough.        Vitals:    02/14/22 1839 02/14/22 1846   BP: (!) 162/89 (!) 147/83   BP Location: Right arm Right arm   Patient Position: Right side Right side   Cuff Size: Adult Large Adult Large   Pulse: 83    Temp: 97.9  F (36.6  C)    SpO2: 100%    Weight: (!) 174.6 kg (385 lb)        Physical Exam  Vitals and nursing note reviewed.   Constitutional:       General: He is not in acute distress.     Appearance: He is not toxic-appearing or diaphoretic.   HENT:      Head: Normocephalic and atraumatic.      Right Ear: Tympanic membrane, ear canal and external ear normal.      Left Ear: Tympanic membrane, ear canal and external ear normal.   Eyes:      Conjunctiva/sclera: Conjunctivae normal.   Pulmonary:      Effort: Pulmonary effort is normal. No respiratory distress.   Neurological:      Mental Status: He is alert.   Psychiatric:         Mood and Affect: Mood normal.         Behavior: Behavior normal.         Thought Content: Thought content normal.         Judgment: Judgment normal.       At the end of the encounter, I discussed results, diagnosis, medications. Discussed red flags for immediate return to clinic/ER, as well as indications for follow up if no improvement. Patient understood and agreed to plan. Patient was stable for discharge.

## 2022-02-15 NOTE — PATIENT INSTRUCTIONS
1. Recommend taking Flonase nasal spray if you are experiencing any nasal congestion. This helps open up your eustachian tube and helps decrease pressure behind her eardrums.  2. Your ear exam appears normal today. There is no significant amount of wax requiring the use of Debrox solution at this time.  3. Follow-up if symptoms fail to improve over the course the next 5 days.

## 2022-02-21 ENCOUNTER — OFFICE VISIT (OUTPATIENT)
Dept: FAMILY MEDICINE | Facility: CLINIC | Age: 42
End: 2022-02-21
Payer: COMMERCIAL

## 2022-02-21 VITALS
WEIGHT: 315 LBS | HEART RATE: 92 BPM | BODY MASS INDEX: 52.22 KG/M2 | SYSTOLIC BLOOD PRESSURE: 134 MMHG | OXYGEN SATURATION: 98 % | TEMPERATURE: 98.9 F | DIASTOLIC BLOOD PRESSURE: 83 MMHG

## 2022-02-21 DIAGNOSIS — H60.502 ACUTE OTITIS EXTERNA OF LEFT EAR, UNSPECIFIED TYPE: Primary | ICD-10-CM

## 2022-02-21 PROCEDURE — 99213 OFFICE O/P EST LOW 20 MIN: CPT | Performed by: PHYSICIAN ASSISTANT

## 2022-02-21 RX ORDER — CIPROFLOXACIN AND DEXAMETHASONE 3; 1 MG/ML; MG/ML
4 SUSPENSION/ DROPS AURICULAR (OTIC) 2 TIMES DAILY
Qty: 7.5 ML | Refills: 0 | Status: SHIPPED | OUTPATIENT
Start: 2022-02-21 | End: 2022-02-28

## 2022-02-21 RX ORDER — IBUPROFEN 200 MG
200 TABLET ORAL EVERY 4 HOURS PRN
COMMUNITY
End: 2023-05-12

## 2022-02-22 NOTE — PATIENT INSTRUCTIONS
Patient Education     External Ear Infection (Adult)    External otitis (also called  swimmer s ear ) is an infection in the ear canal. It's often caused by bacteria or fungus. It can occur a few days after water gets trapped in the ear canal (from swimming or bathing). It can also occur after cleaning too deeply in the ear canal with a cotton swab or other object. Sometimes, hair care products get into the ear canal and cause this problem.   Symptoms can include pain, fever, itching, redness, drainage, or swelling of the ear canal. Temporary hearing loss may also occur.   Home care    Don't try to clean the ear canal. This can push pus and bacteria deeper into the canal.    Use prescribed ear drops as directed. These help reduce swelling and fight the infection. If an ear wick was placed in the ear canal, apply drops right onto the end of the wick. The wick will draw the medicine into the ear canal even if it's swollen closed.    A cotton ball may be loosely placed in the outer ear to absorb any drainage.    You may use over-the-counter medicines to control pain as directed by the healthcare provider, unless another medicine was prescribed. Talk with your provider before using these medicines if you have chronic liver or kidney disease or ever had a stomach ulcer or digestive tract bleeding.    Don't allow water to get into your ear when bathing. Also don't swim until the infection has cleared.    Prevention    Keep your ears dry. This helps lower the risk of infection. Dry your ears with a towel or hair dryer after getting wet. Also, use ear plugs when swimming.    Don't stick any objects in the ear to remove wax.    Talk with your provider about using ear drops to prevent swimmer's ear in case you feel water trapped in your ear canal. You can get these drops over the counter at most drugstores. They work by removing water from the ear canal.    Follow-up care  Follow up with your healthcare provider in 1 week, or  as advised.   When to seek medical advice  Call your healthcare provider right away if any of these occur:     Ear pain becomes worse or doesn t improve after 3 days of treatment    Redness or swelling of the outer ear occurs or gets worse    Headache    Fever of 100.4 F (38 C) or higher, or as directed by your healthcare provider  Call 911  Call 911 or get immediate medical care if any of the following occur:     Seizure    Unusual drowsiness or confusion    Unusual painful or stiff neck    Mirza last reviewed this educational content on 8/1/2020 2000-2021 [employer]. All rights reserved. This information is not intended as a substitute for professional medical care. Always follow your healthcare professional's instructions.

## 2022-02-22 NOTE — PROGRESS NOTES
Assessment & Plan     Acute otitis externa of left ear, unspecified type  Patient has about half bottle of Ciprodex from prior use and it is not .  Recommend he use this 4 drops to each ear twice daily for approximately 7 days.  He may apply warm compresses.  He should avoid Q-tips in his ear and avoid earbud use for now.  He should return for persistent or worsening symptoms.  He was given a refill of Ciprodex in case he runs out prior to the 7-day full course.  - ciprofloxacin-dexamethasone (CIPRODEX) 0.3-0.1 % otic suspension  Dispense: 7.5 mL; Refill: 0       Swapna Marion PA-C  [facility]    Marycarmen Amato is a 41 year old male who presents to clinic today for the following health issues:  Chief Complaint   Patient presents with     Otalgia     bilateral ears pain      HPI  Lm returns to urgent care with concerns regarding bilateral ear pain.  He was seen last week and at that time had bilateral ear pain that was thought to be related to his sinus congestion.  He was treated with Flonase and he has not noted any improvement.  His sinus symptoms are resolved and he no longer has any nasal  He denies any hearing change.  Flonase note helpful.  Seudafed with sinus pressure was not helpful for ears.    He denies any fevers or chills.  He had been using ear buds to listen to music and had also been excessively cleaning his ears with Q-tips.  He is aware that is not healthy for his ears.  He notes pain to the touch and notices when he lays on his ear it is more uncomfortable.  He denies any discharge.    Review of Systems  Constitutional, HEENT, cardiovascular, pulmonary, gi and gu systems are negative, except as otherwise noted.      Patient Active Problem List   Diagnosis     Soft tissue abscess of inguinal region     Diabetes mellitus, type 2 (H)     Morbid obesity (H)     Cellulitis of scrotum     Essential hypertension     Microalbuminuria     Other specified injury  of muscle, fascia and tendon of the posterior muscle group at thigh level, unspecified thigh, initial encounter     Sprain, hamstring     Current Outpatient Medications   Medication     amLODIPine (NORVASC) 10 MG tablet     aspirin (ASA) 81 MG EC tablet     BD PEN NEEDLE ZION 2ND GEN 32G X 4 MM miscellaneous     blood glucose (NO BRAND SPECIFIED) lancets standard     blood glucose (PRECISION QID TEST) test strip     ciprofloxacin-dexamethasone (CIPRODEX) 0.3-0.1 % otic suspension     ibuprofen (ADVIL/MOTRIN) 200 MG tablet     insulin glargine (BASAGLAR KWIKPEN) 100 UNIT/ML pen     losartan (COZAAR) 100 MG tablet     metFORMIN (GLUCOPHAGE-XR) 500 MG 24 hr tablet     metoprolol tartrate (LOPRESSOR) 100 MG tablet     No current facility-administered medications for this visit.       Objective    /83 (BP Location: Left arm, Patient Position: Sitting, Cuff Size: Adult Large)   Pulse 92   Temp 98.9  F (37.2  C) (Tympanic)   Wt (!) 174.6 kg (385 lb)   SpO2 98%   BMI 52.22 kg/m    Physical Exam   Pt is in no acute distress and appears well  Ears patent B:  TM s intact, non-injected. All land marks easily visibile.  Canals are mildly erythematous.  No discharge There is pain with palpations of the B auricle and pinna.      Nasal mucosa is non-edematous, no discharge.    Pharynx: non erythematous, tonsils non hypertrophied, No exudate   Neck supple: no adenopathy  Lungs: CTA  Heart: RRR, no murmur, no thrills or heaves   Ext: no edema  Skin: no rashes

## 2022-04-08 ENCOUNTER — OFFICE VISIT (OUTPATIENT)
Dept: FAMILY MEDICINE | Facility: CLINIC | Age: 42
End: 2022-04-08
Payer: COMMERCIAL

## 2022-04-08 VITALS
HEART RATE: 76 BPM | BODY MASS INDEX: 49.37 KG/M2 | WEIGHT: 315 LBS | SYSTOLIC BLOOD PRESSURE: 112 MMHG | DIASTOLIC BLOOD PRESSURE: 82 MMHG | OXYGEN SATURATION: 97 %

## 2022-04-08 DIAGNOSIS — E66.01 MORBID OBESITY (H): ICD-10-CM

## 2022-04-08 DIAGNOSIS — E11.628 TYPE 2 DIABETES MELLITUS WITH OTHER SKIN COMPLICATION, WITH LONG-TERM CURRENT USE OF INSULIN (H): Primary | ICD-10-CM

## 2022-04-08 DIAGNOSIS — I10 ESSENTIAL HYPERTENSION: ICD-10-CM

## 2022-04-08 DIAGNOSIS — Z79.4 TYPE 2 DIABETES MELLITUS WITH OTHER SKIN COMPLICATION, WITH LONG-TERM CURRENT USE OF INSULIN (H): Primary | ICD-10-CM

## 2022-04-08 DIAGNOSIS — Z23 HIGH PRIORITY FOR 2019-NCOV VACCINE: ICD-10-CM

## 2022-04-08 PROBLEM — L02.214 SOFT TISSUE ABSCESS OF INGUINAL REGION: Status: RESOLVED | Noted: 2020-10-07 | Resolved: 2022-04-08

## 2022-04-08 PROBLEM — S76.399A: Status: RESOLVED | Noted: 2018-08-13 | Resolved: 2022-04-08

## 2022-04-08 PROBLEM — R80.9 MICROALBUMINURIA: Status: RESOLVED | Noted: 2021-02-26 | Resolved: 2022-04-08

## 2022-04-08 PROBLEM — N49.2 CELLULITIS OF SCROTUM: Status: RESOLVED | Noted: 2019-12-27 | Resolved: 2022-04-08

## 2022-04-08 LAB
CREAT UR-MCNC: 90 MG/DL
HBA1C MFR BLD: 7.1 % (ref 0–5.6)
HOLD SPECIMEN: NORMAL
MICROALBUMIN UR-MCNC: 20.9 MG/DL (ref 0–1.99)
MICROALBUMIN/CREAT UR: 232.2 MG/G CR

## 2022-04-08 PROCEDURE — 82043 UR ALBUMIN QUANTITATIVE: CPT | Performed by: STUDENT IN AN ORGANIZED HEALTH CARE EDUCATION/TRAINING PROGRAM

## 2022-04-08 PROCEDURE — 99214 OFFICE O/P EST MOD 30 MIN: CPT | Mod: 25 | Performed by: STUDENT IN AN ORGANIZED HEALTH CARE EDUCATION/TRAINING PROGRAM

## 2022-04-08 PROCEDURE — 36415 COLL VENOUS BLD VENIPUNCTURE: CPT | Performed by: STUDENT IN AN ORGANIZED HEALTH CARE EDUCATION/TRAINING PROGRAM

## 2022-04-08 PROCEDURE — 0054A COVID-19,PF,PFIZER (12+ YRS): CPT | Performed by: STUDENT IN AN ORGANIZED HEALTH CARE EDUCATION/TRAINING PROGRAM

## 2022-04-08 PROCEDURE — 91305 COVID-19,PF,PFIZER (12+ YRS): CPT | Performed by: STUDENT IN AN ORGANIZED HEALTH CARE EDUCATION/TRAINING PROGRAM

## 2022-04-08 PROCEDURE — 83036 HEMOGLOBIN GLYCOSYLATED A1C: CPT | Performed by: STUDENT IN AN ORGANIZED HEALTH CARE EDUCATION/TRAINING PROGRAM

## 2022-04-08 RX ORDER — ATORVASTATIN CALCIUM 40 MG/1
40 TABLET, FILM COATED ORAL DAILY
Qty: 90 TABLET | Refills: 3 | Status: SHIPPED | OUTPATIENT
Start: 2022-04-08 | End: 2023-04-06

## 2022-04-08 NOTE — PROGRESS NOTES
Assessment and Plan     41-year-old male with past medical history of type 2 diabetes on insulin, essential hypertension, morbid obesity who presents in follow-up for diabetic visit.    1. Type 2 diabetes mellitus with other skin complication, with long-term current use of insulin (H)  Patient has restarted taking his medications regularly and significantly improved his diet and exercise routine.  He has lost 20 pounds since we last saw him 2 months ago.  40 pounds since I saw him in January.  Reviewing his blood sugars today these look phenomenal with fasting and preprandial all being in the low 100s.  We will check a hemoglobin A1c today which I believe likely be at goal of less than 7.  Also a microalbumin.  For some reason he is not on a statin medicine.  Unclear why.  We will start him on atorvastatin 40 mg daily.  Does need a referral for a diabetic eye exam as it has been over a year since his last.  Otherwise he will continue his current medication regimen.  - REVIEW OF HEALTH MAINTENANCE PROTOCOL ORDERS  - OPTOMETRY REFERRAL; Future  - Albumin Random Urine Quantitative with Creat Ratio; Future  - HEMOGLOBIN A1C; Future  - atorvastatin (LIPITOR) 40 MG tablet; Take 1 tablet (40 mg) by mouth daily  Dispense: 90 tablet; Refill: 3  -Continue Basaglar 40 units nightly, Metformin 2000 mg total a day, aspirin 81 mg daily    2. High priority for 2019-nCoV vaccine  - COVID-19,PF,PFIZER (12+ Yrs GRAY LABEL)    3. Morbid obesity (H)  Congratulated patient on losing weight.  Recommended he continue to work on this to decrease his BMI to a healthier level.    4. Essential hypertension  Blood pressure in control today as opposed to over the last couple months.  We have this is likely related to his weight loss.  I recommended he continue to work on this and continue his antihypertensives as below  -Continue losartan 100 mg daily, can amlodipine 10 mg daily, metoprolol 100 mg daily    Follow up: 3-6 months for DM and HTN  as well as physical  Options for treatment and follow-up care were reviewed with the patient and/or guardian. Lm Leigh and/or guardian engaged in the decision making process and verbalized understanding of the options discussed and agreed with the final plan.    Dr. Nick Reyes         HPI:   Lm Leigh is a 41 year old  male who presents for:    Chief Complaint   Patient presents with     Diabetes     a1c check. numbers have been good.      Answers for HPI/ROS submitted by the patient on 4/8/2022  Frequency of checking blood sugars:: three times daily  What time of day are you checking your blood sugars : before meals, at bedtime  Have you had any blood sugars above 200?: No  Have you had any blood sugars below 70?: No  Hypoglycemia symptoms:: none  Diabetic concerns:: frequent infections  Paraesthesia present:: none of these symptoms  Have you had a diabetic eye exam within the last year?: No  How many servings of fruits and vegetables do you eat daily?: 2-3  On average, how many sweetened beverages do you drink each day (Examples: soda, juice, sweet tea, etc.  Do NOT count diet or artificially sweetened beverages)?: 0  How many minutes a day do you exercise enough to make your heart beat faster?: 30 to 60  How many days a week do you exercise enough to make your heart beat faster?: 4  How many days per week do you miss taking your medication?: 0      Diagnosed in unknown  Most recent HgbA1c:   Lab Results   Component Value Date    A1C 10.1 01/13/2022    A1C 7.8 10/01/2021    A1C 8.4 03/23/2021    A1C 10.9 12/23/2020    A1C 14.7 12/27/2019       Current medication regimen: basaglar 40 units at night, metformin 1,000 mg BID    ASA: 81 mg daily  Statin: 40 mg atorvastatin daily adding on today    Complications:  - Retinopathy: Last ophthalmology appointment- about a year ago  - Nephropathy: last BMP 1/7/22- Normal  - Neuropathy: foot exam 10/1/21    HTN:  Diagnosed in: unknown  BP Goal: <140/90  Current  Medication regimen: losartan 100 gm daily, metoprolol 100 mg daily, amlodapine 10 mg daily  Had a cough with lisinopril  List of last office values:  BP Readings from Last 6 Encounters:   04/08/22 112/82   02/21/22 134/83   02/14/22 (!) 147/83   02/03/22 (!) 147/82   01/27/22 (!) 142/78   01/13/22 (!) 144/86   Last BMP: 1/7/22         PMHX:     Patient Active Problem List   Diagnosis     Soft tissue abscess of inguinal region     Diabetes mellitus, type 2 (H)     Morbid obesity (H)     Cellulitis of scrotum     Essential hypertension     Microalbuminuria     Other specified injury of muscle, fascia and tendon of the posterior muscle group at thigh level, unspecified thigh, initial encounter     Sprain, hamstring       Current Outpatient Medications   Medication Sig Dispense Refill     amLODIPine (NORVASC) 10 MG tablet TAKE 1 TABLET BY MOUTH EVERY DAY. INCREASED DOSE 90 tablet 3     aspirin (ASA) 81 MG EC tablet Take 81 mg by mouth daily       BD PEN NEEDLE ZION 2ND GEN 32G X 4 MM miscellaneous USE TO INJECT INSULIN ONCE DAILY       blood glucose (NO BRAND SPECIFIED) lancets standard Use to check blood sugars three times daily. Dispense brand per patient's insurance at pharmacy discretion.       blood glucose (PRECISION QID TEST) test strip Use to check blood sugars three times daily. Strips compatible with Accuchek guide. Dispense brand per patient's insurance at pharmacy discretion.       ibuprofen (ADVIL/MOTRIN) 200 MG tablet Take 200 mg by mouth every 4 hours as needed for mild pain       insulin glargine (BASAGLAR KWIKPEN) 100 UNIT/ML pen Inject 40 Units Subcutaneous daily Ok to substitute Lantus/Semglee at same dose and frequency if insurance prefers 45 mL 0     losartan (COZAAR) 100 MG tablet TAKE 1 TABLET BY MOUTH EVERY DAY 90 tablet 3     metFORMIN (GLUCOPHAGE-XR) 500 MG 24 hr tablet Take 2 tablets (1,000 mg) by mouth 2 times daily (with meals) 360 tablet 3     metoprolol tartrate (LOPRESSOR) 100 MG tablet  Take 1 tablet (100 mg) by mouth 2 times daily 180 tablet 3       Social History     Tobacco Use     Smoking status: Never Smoker     Smokeless tobacco: Never Used   Substance Use Topics     Alcohol use: Not Currently     Drug use: Not Currently       Social History     Social History Narrative     Not on file       Allergies   Allergen Reactions     Shellfish Allergy Anaphylaxis     Lisinopril Cough     Other Food Allergy Hives     Blue berries       No results found for this or any previous visit (from the past 24 hour(s)).         Review of Systems:    ROS: 10 point ROS neg other than the symptoms noted above in the HPI.         Physical Exam:     Vitals:    04/08/22 0957   BP: 112/82   BP Location: Left arm   Patient Position: Sitting   Cuff Size: Adult Large   Pulse: 76   SpO2: 97%   Weight: (!) 165.1 kg (364 lb)     Body mass index is 49.37 kg/m .    General appearance: Alert, cooperative, no distress, appears stated age, obese  Head: Normocephalic, atraumatic, without obvious abnormality  Eyes: Pupils equal round, reactive.  Conjunctiva clear.  Nose: Nares normal, no drainage.  Throat: Lips, mucosa, tongue normal mucosa pink and moist  Neck: Supple, symmetric, trachea midline  Lungs: Clear to auscultation bilaterally, no wheezing or crackles present.  Respirations unlabored  Heart: Regular rate and rhythm, normal S1 and S2, no murmur, rub or gallop.  Extremities: Extremities normal, atraumatic.  No cyanosis or edema.  Skin: Skin color, texture, turgor normal no rashes or lesions on limited skin exam  Neurologic: CN II through XII intact, normal strength.

## 2022-04-11 NOTE — RESULT ENCOUNTER NOTE
I called the patient but no answer. Left message explaining recent clinic results and next steps. Advised to call clinic or send Sitem[facility] message with questions. Recommended Follow-up in 3-6 months for diabetes    Dr. Nick Reyes

## 2022-05-18 DIAGNOSIS — Z79.4 TYPE 2 DIABETES MELLITUS WITH OTHER SKIN COMPLICATION, WITH LONG-TERM CURRENT USE OF INSULIN (H): ICD-10-CM

## 2022-05-18 DIAGNOSIS — E11.628 TYPE 2 DIABETES MELLITUS WITH OTHER SKIN COMPLICATION, WITH LONG-TERM CURRENT USE OF INSULIN (H): ICD-10-CM

## 2022-05-19 RX ORDER — INSULIN GLARGINE 100 [IU]/ML
INJECTION, SOLUTION SUBCUTANEOUS
Qty: 36 ML | Refills: 1 | Status: SHIPPED | OUTPATIENT
Start: 2022-05-19 | End: 2022-08-22

## 2022-05-19 NOTE — TELEPHONE ENCOUNTER
"Last Written Prescription Date:  2/3/22  Last Fill Quantity: 45 ml,  # refills: 0   Last office visit provider:  4/8/22     Requested Prescriptions   Pending Prescriptions Disp Refills     insulin glargine (BASAGLAR KWIKPEN) 100 UNIT/ML pen [Pharmacy Med Name: BASAGLAR 100 UNIT/ML KWIKPEN]  2     Sig: INJECT 40 UNITS SUBCUTANEOUS DAILY       Long Acting Insulin Protocol Passed - 5/19/2022  7:09 AM        Passed - Serum creatinine on file in past 12 months     Recent Labs   Lab Test 01/07/22  1935   CR 1.11       Ok to refill medication if creatinine is low          Passed - HgbA1C in past 3 or 6 months     If HgbA1C is 8 or greater, it needs to be on file within the past 3 months.  If less than 8, must be on file within the past 6 months.     Recent Labs   Lab Test 04/08/22  1035   A1C 7.1*             Passed - Medication is active on med list        Passed - Patient is age 18 or older        Passed - Recent (6 mo) or future (30 days) visit within the authorizing provider's specialty     Patient had office visit in the last 6 months or has a visit in the next 30 days with authorizing provider or within the authorizing provider's specialty.  See \"Patient Info\" tab in inbasket, or \"Choose Columns\" in Meds & Orders section of the refill encounter.                 Nicola Woods RN 05/19/22 7:11 AM  "

## 2022-06-07 DIAGNOSIS — E11.65 TYPE 2 DIABETES MELLITUS WITH HYPERGLYCEMIA (H): ICD-10-CM

## 2022-06-07 NOTE — TELEPHONE ENCOUNTER
"Routing refill request to provider for review/approval because:  Due to medication information not transferring due to [facility] please review the medication information prior to signing to ensure accuracy.    Last Written Prescription:        Last office visit provider: 4/8/22    Requested Prescriptions   Pending Prescriptions Disp Refills     BD PEN NEEDLE ZION 2ND GEN 32G X 4 MM miscellaneous [Pharmacy Med Name: BD ZION 2 GEN PEN NDL [id number]] 100 each 11     Sig: USE TO INJECT INSULIN ONCE DAILY       Diabetic Supplies Protocol Passed - 6/7/2022 12:31 AM        Passed - Medication is active on med list        Passed - Patient is 18 years of age or older        Passed - Recent (6 mo) or future (30 days) visit within the authorizing provider's specialty     Patient had office visit in the last 6 months or has a visit in the next 30 days with authorizing provider.  See \"Patient Info\" tab in inbasket, or \"Choose Columns\" in Meds & Orders section of the refill encounter.                 Ayesha Johnston RN 06/07/22 7:33 PM  "

## 2022-06-08 RX ORDER — PEN NEEDLE, DIABETIC 32GX 5/32"
NEEDLE, DISPOSABLE MISCELLANEOUS
Qty: 100 EACH | Refills: 11 | Status: SHIPPED | OUTPATIENT
Start: 2022-06-08 | End: 2023-07-25

## 2022-07-10 ENCOUNTER — HEALTH MAINTENANCE LETTER (OUTPATIENT)
Age: 42
End: 2022-07-10

## 2022-08-13 ENCOUNTER — OFFICE VISIT (OUTPATIENT)
Dept: FAMILY MEDICINE | Facility: CLINIC | Age: 42
End: 2022-08-13
Payer: COMMERCIAL

## 2022-08-13 VITALS
SYSTOLIC BLOOD PRESSURE: 133 MMHG | TEMPERATURE: 97.6 F | HEART RATE: 84 BPM | DIASTOLIC BLOOD PRESSURE: 86 MMHG | RESPIRATION RATE: 17 BRPM | OXYGEN SATURATION: 96 %

## 2022-08-13 DIAGNOSIS — R07.0 THROAT PAIN: Primary | ICD-10-CM

## 2022-08-13 LAB
DEPRECATED S PYO AG THROAT QL EIA: NEGATIVE
GROUP A STREP BY PCR: NOT DETECTED

## 2022-08-13 PROCEDURE — U0003 INFECTIOUS AGENT DETECTION BY NUCLEIC ACID (DNA OR RNA); SEVERE ACUTE RESPIRATORY SYNDROME CORONAVIRUS 2 (SARS-COV-2) (CORONAVIRUS DISEASE [COVID-19]), AMPLIFIED PROBE TECHNIQUE, MAKING USE OF HIGH THROUGHPUT TECHNOLOGIES AS DESCRIBED BY CMS-2020-01-R: HCPCS | Performed by: PHYSICIAN ASSISTANT

## 2022-08-13 PROCEDURE — U0005 INFEC AGEN DETEC AMPLI PROBE: HCPCS | Performed by: PHYSICIAN ASSISTANT

## 2022-08-13 PROCEDURE — 87651 STREP A DNA AMP PROBE: CPT | Performed by: PHYSICIAN ASSISTANT

## 2022-08-13 PROCEDURE — 99214 OFFICE O/P EST MOD 30 MIN: CPT | Mod: CS | Performed by: PHYSICIAN ASSISTANT

## 2022-08-13 ASSESSMENT — ENCOUNTER SYMPTOMS
COUGH: 1
FEVER: 0
SORE THROAT: 1
GASTROINTESTINAL NEGATIVE: 1

## 2022-08-13 ASSESSMENT — PAIN SCALES - GENERAL: PAINLEVEL: MILD PAIN (3)

## 2022-08-13 NOTE — PATIENT INSTRUCTIONS
1) Increase fluids and rest  2) May take Mucinex over the counter for congestion as needed.  3) May take Tylenol/Ibuprofen for fever/pain relief as needed.  4) Salt water gargles and lozenges can be helpful for throat relief  5) You will only be notified of the confirmatory strep or COVID results if they are positive. They will be visible via Mychart in 1-2 days.

## 2022-08-13 NOTE — PROGRESS NOTES
Patient presents with:  Urgent Care  Throat Pain: Per pt states when he woke up this morning around 3AM he started having throat pain, a little bit of a cough due to try and clear his throat states no other symptoms but has history of strep       Clinical Decision Making: ST that started 8 hours ago. RST neg. Confirmatory strep and COVID tests in process. Recommend supportive cares. PE benign.       ICD-10-CM    1. Throat pain  R07.0 Streptococcus A Rapid Screen w/Reflex to PCR - Clinic Collect     Group A Streptococcus PCR Throat Swab     Symptomatic; Yes; 8/13/2022 COVID-19 Virus (Coronavirus) by PCR Nose       Patient Instructions   1) Increase fluids and rest  2) May take Mucinex over the counter for congestion as needed.  3) May take Tylenol/Ibuprofen for fever/pain relief as needed.  4) Salt water gargles and lozenges can be helpful for throat relief  5) You will only be notified of the confirmatory strep or COVID results if they are positive. They will be visible via Mychart in 1-2 days.         HPI:  Lm Leigh is a 41 year old male with PMHx of obesity, DM2, HTN who presents today complaining of ST that started 8 hours ago. Pain woke him from sleep. Patient also has a mild cough, but it feels secondary to the throat irritation.     History obtained from the patient.    Problem List:  2021-10: Diabetes mellitus, type 2 (H)  2021-10: Morbid obesity (H)  2021-02: Microalbuminuria  2020-10: Soft tissue abscess of inguinal region  2019-12: Cellulitis of scrotum  2019-12: Essential hypertension  2018-08: Other specified injury of muscle, fascia and tendon of the   posterior muscle group at thigh level, unspecified thigh, initial   encounter  2018-08: Sprain, hamstring      Past Medical History:   Diagnosis Date     Hypertension      Obesity        Social History     Tobacco Use     Smoking status: Never Smoker     Smokeless tobacco: Never Used   Substance Use Topics     Alcohol use: Not Currently        Review of Systems   Constitutional: Negative for fever.   HENT: Positive for congestion (mild, resolved) and sore throat. Negative for ear pain.    Respiratory: Positive for cough (mild).    Gastrointestinal: Negative.        Vitals:    08/13/22 1139 08/13/22 1203   BP: (!) 162/91 133/86   BP Location:  Right arm   Patient Position:  Sitting   Cuff Size:  Thigh   Pulse: 85 84   Resp: 17    Temp: 97.6  F (36.4  C)    TempSrc: Tympanic    SpO2: 96%        Physical Exam  Vitals and nursing note reviewed.   Constitutional:       General: He is not in acute distress.     Appearance: He is not toxic-appearing or diaphoretic.   HENT:      Head: Normocephalic and atraumatic.      Right Ear: External ear normal.      Left Ear: External ear normal.      Mouth/Throat:      Mouth: Mucous membranes are moist.      Pharynx: Posterior oropharyngeal erythema present. No oropharyngeal exudate.   Eyes:      Conjunctiva/sclera: Conjunctivae normal.   Cardiovascular:      Rate and Rhythm: Normal rate and regular rhythm.      Heart sounds: No murmur heard.  Pulmonary:      Effort: Pulmonary effort is normal. No respiratory distress.      Breath sounds: No stridor. No wheezing, rhonchi or rales.   Neurological:      Mental Status: He is alert.   Psychiatric:         Mood and Affect: Mood normal.         Behavior: Behavior normal.         Thought Content: Thought content normal.         Judgment: Judgment normal.         Results:  Results for orders placed or performed in visit on 08/13/22   Streptococcus A Rapid Screen w/Reflex to PCR - Clinic Collect     Status: Normal    Specimen: Throat; Swab   Result Value Ref Range    Group A Strep antigen Negative Negative         At the end of the encounter, I discussed results, diagnosis, medications. Discussed red flags for immediate return to clinic/ER, as well as indications for follow up if no improvement. Patient understood and agreed to plan. Patient was stable for discharge.

## 2022-08-14 LAB — SARS-COV-2 RNA RESP QL NAA+PROBE: NEGATIVE

## 2022-08-22 DIAGNOSIS — E11.628 TYPE 2 DIABETES MELLITUS WITH OTHER SKIN COMPLICATION, WITH LONG-TERM CURRENT USE OF INSULIN (H): ICD-10-CM

## 2022-08-22 DIAGNOSIS — Z79.4 TYPE 2 DIABETES MELLITUS WITH OTHER SKIN COMPLICATION, WITH LONG-TERM CURRENT USE OF INSULIN (H): ICD-10-CM

## 2022-08-22 RX ORDER — INSULIN GLARGINE 100 [IU]/ML
INJECTION, SOLUTION SUBCUTANEOUS
Qty: 30 ML | Refills: 0 | Status: SHIPPED | OUTPATIENT
Start: 2022-08-22 | End: 2023-01-23

## 2022-08-22 NOTE — TELEPHONE ENCOUNTER
"Last Written Prescription Date:  5/16/22  Last Fill Quantity: 36 ml,  # refills: 1   Last office visit provider:  4/8/22     Requested Prescriptions   Pending Prescriptions Disp Refills     insulin glargine (BASAGLAR KWIKPEN) 100 UNIT/ML pen [Pharmacy Med Name: BASAGLAR 100 UNIT/ML KWIKPEN]  3     Sig: INJECT 40 UNITS SUBCUTANEOUS DAILY       Long Acting Insulin Protocol Failed - 8/22/2022  3:15 PM        Failed - Recent (6 mo) or future (30 days) visit within the authorizing provider's specialty     Patient had office visit in the last 6 months or has a visit in the next 30 days with authorizing provider or within the authorizing provider's specialty.  See \"Patient Info\" tab in inbasket, or \"Choose Columns\" in Meds & Orders section of the refill encounter.            Passed - Serum creatinine on file in past 12 months     Recent Labs   Lab Test 01/07/22  1935   CR 1.11       Ok to refill medication if creatinine is low          Passed - HgbA1C in past 3 or 6 months     If HgbA1C is 8 or greater, it needs to be on file within the past 3 months.  If less than 8, must be on file within the past 6 months.     Recent Labs   Lab Test 04/08/22  1035   A1C 7.1*             Passed - Medication is active on med list        Passed - Patient is age 18 or older             Nicola Woods RN 08/22/22 3:15 PM  "

## 2022-08-23 RX ORDER — INSULIN GLARGINE 100 [IU]/ML
INJECTION, SOLUTION SUBCUTANEOUS
Refills: 1 | OUTPATIENT
Start: 2022-08-23

## 2022-08-23 NOTE — TELEPHONE ENCOUNTER
Refilled 8/22/22 with 0 refills.  Current script represents as close to a 90 day supply as allowed by the distribution rules now in place for pharmacies.

## 2022-09-11 ENCOUNTER — HEALTH MAINTENANCE LETTER (OUTPATIENT)
Age: 42
End: 2022-09-11

## 2022-10-01 DIAGNOSIS — E11.65 TYPE 2 DIABETES MELLITUS WITH HYPERGLYCEMIA, WITHOUT LONG-TERM CURRENT USE OF INSULIN (H): ICD-10-CM

## 2022-10-02 NOTE — TELEPHONE ENCOUNTER
"Due for a1c    Last Written Prescription Date:  10/1/21  Last Fill Quantity: 360,  # refills: 3   Last office visit provider:  8/13/22     Requested Prescriptions   Pending Prescriptions Disp Refills     metFORMIN (GLUCOPHAGE XR) 500 MG 24 hr tablet [Pharmacy Med Name: METFORMIN HCL  MG TABLET] 360 tablet 3     Sig: TAKE 2 TABLETS BY MOUTH 2 TIMES DAILY WITH MEALS       Biguanide Agents Passed - 10/1/2022 11:19 AM        Passed - Patient is age 10 or older        Passed - Patient has documented A1c within the specified period of time.     If HgbA1C is 8 or greater, it needs to be on file within the past 3 months.  If less than 8, must be on file within the past 6 months.     Recent Labs   Lab Test 04/08/22  1035   A1C 7.1*             Passed - Patient's CR is NOT>1.4 OR Patient's EGFR is NOT<45 within past 12 mos.     Recent Labs   Lab Test 01/07/22  1935 10/01/21  0900 03/23/21  0753   GFRESTIMATED 87   < > >60   GFRESTBLACK  --   --  >60    < > = values in this interval not displayed.       Recent Labs   Lab Test 01/07/22  1935   CR 1.11             Passed - Patient does NOT have a diagnosis of CHF.        Passed - Medication is active on med list        Passed - Recent (6 mo) or future (30 days) visit within the authorizing provider's specialty     Patient had office visit in the last 6 months or has a visit in the next 30 days with authorizing provider or within the authorizing provider's specialty.  See \"Patient Info\" tab in inbasket, or \"Choose Columns\" in Meds & Orders section of the refill encounter.                 Freya Fletcher, RN 10/02/22 3:14 PM  "

## 2022-10-03 RX ORDER — METFORMIN HCL 500 MG
TABLET, EXTENDED RELEASE 24 HR ORAL
Qty: 360 TABLET | Refills: 3 | Status: SHIPPED | OUTPATIENT
Start: 2022-10-03 | End: 2023-09-29

## 2022-10-04 PROBLEM — S76.399A: Status: RESOLVED | Noted: 2018-08-13 | Resolved: 2022-04-08

## 2022-11-16 DIAGNOSIS — I10 ESSENTIAL HYPERTENSION: ICD-10-CM

## 2022-11-16 RX ORDER — METOPROLOL TARTRATE 100 MG
TABLET ORAL
Qty: 180 TABLET | Refills: 1 | Status: SHIPPED | OUTPATIENT
Start: 2022-11-16 | End: 2023-05-07

## 2022-11-17 NOTE — TELEPHONE ENCOUNTER
"Last Written Prescription Date:  10/1/21  Last Fill Quantity: 180,  # refills: 3   Last office visit provider:  4/8/22     Requested Prescriptions   Pending Prescriptions Disp Refills     metoprolol tartrate (LOPRESSOR) 100 MG tablet [Pharmacy Med Name: METOPROLOL TARTRATE 100 MG TAB] 180 tablet 3     Sig: TAKE 1 TABLET BY MOUTH TWICE A DAY       Beta-Blockers Protocol Passed - 11/16/2022 12:30 AM        Passed - Blood pressure under 140/90 in past 12 months     BP Readings from Last 3 Encounters:   08/13/22 133/86   04/08/22 112/82   02/21/22 134/83                 Passed - Patient is age 6 or older        Passed - Recent (12 mo) or future (30 days) visit within the authorizing provider's specialty     Patient has had an office visit with the authorizing provider or a provider within the authorizing providers department within the previous 12 mos or has a future within next 30 days. See \"Patient Info\" tab in inbasket, or \"Choose Columns\" in Meds & Orders section of the refill encounter.              Passed - Medication is active on med list             Freya Fletcher RN 11/16/22 6:25 PM  "

## 2022-12-24 NOTE — PATIENT INSTRUCTIONS - HE
Patient Instructions by Anson Pickett PA-C at 6/25/2020  6:10 PM     Author: Anson Pickett PA-C Service: -- Author Type: Physician Assistant    Filed: 6/25/2020  6:27 PM Encounter Date: 6/25/2020 Status: Signed    : Anson Pickett PA-C (Physician Assistant)         Patient Education     External Ear Infection (Adult)    External otitis (also called swimmers ear) is an infection in the ear canal. It is often caused by bacteria or fungus. It can occur a few days after water gets trapped in the ear canal (from swimming or bathing). It can also occur after cleaning too deeply in the ear canal with a cotton swab or other object. Sometimes, hair care products get into the ear canal and cause this problem.  Symptoms can include pain, fever, itching, redness, drainage, or swelling of the ear canal. Temporary hearing loss may also occur.  Home care    Do not try to clean the ear canal. This can push pus and bacteria deeper into the canal.    Use prescribed ear drops as directed. These help reduce swelling and fight the infection. If an ear wick was placed in the ear canal, apply drops right onto the end of the wick. The wick will draw the medicine into the ear canal even if it is swollen closed.    A cotton ball may be loosely placed in the outer ear to absorb any drainage.    You may use acetaminophen or ibuprofen to control pain, unless another medicine was prescribed. Note: If you have chronic liver or kidney disease or ever had a stomach ulcer or GI bleeding, talk to your healthcare provider before taking any of these medicines.    Do not allow water to get into your ear when bathing. Also, don't swim until the infection has cleared.  Prevention    Keep your ears dry. This helps lower the risk of infection. Dry your ears with a towel or hair dryer after getting wet. Also, use ear plugs when swimming.    Do not stick any objects in the ear to remove wax.    If you feel water trapped in your ear, use ear  drops right away. You can get these drops over the counter at most drugstores. They work by removing water from the ear canal.  Follow-up care  Follow up with your healthcare provider in 1 week, or as advised.  When to seek medical advice  Call your healthcare provider right away if any of these occur:    Ear pain becomes worse or doesnt improve after 3 days of treatment    Redness or swelling of the outer ear occurs or gets worse    Headache    Painful or stiff neck    Drowsiness or confusion    Fever of 100.4 F (38 C) or higher, or as directed by your healthcare provider    Seizure  Date Last Reviewed: 10/1/2017    3449-0222 [employer]. [address] [place] [address], [place] [address]. All rights reserved. This information is not intended as a substitute for professional medical care. Always follow your healthcare professional's instructions.                 English

## 2022-12-27 DIAGNOSIS — I10 ESSENTIAL (PRIMARY) HYPERTENSION: ICD-10-CM

## 2022-12-29 RX ORDER — LOSARTAN POTASSIUM 100 MG/1
TABLET ORAL
Qty: 90 TABLET | Refills: 3 | Status: SHIPPED | OUTPATIENT
Start: 2022-12-29 | End: 2023-11-01

## 2022-12-29 NOTE — TELEPHONE ENCOUNTER
"Due for cr k    Last Written Prescription Date:  2/1/22  Last Fill Quantity: 90,  # refills: 3   Last office visit provider:  8/13/22     Requested Prescriptions   Pending Prescriptions Disp Refills     losartan (COZAAR) 100 MG tablet [Pharmacy Med Name: LOSARTAN POTASSIUM 100 MG TAB] 90 tablet 3     Sig: TAKE 1 TABLET BY MOUTH EVERY DAY       Angiotensin-II Receptors Passed - 12/27/2022  7:29 PM        Passed - Last blood pressure under 140/90 in past 12 months     BP Readings from Last 3 Encounters:   08/13/22 133/86   04/08/22 112/82   02/21/22 134/83                 Passed - Recent (12 mo) or future (30 days) visit within the authorizing provider's specialty     Patient has had an office visit with the authorizing provider or a provider within the authorizing providers department within the previous 12 mos or has a future within next 30 days. See \"Patient Info\" tab in inbasket, or \"Choose Columns\" in Meds & Orders section of the refill encounter.              Passed - Medication is active on med list        Passed - Patient is age 18 or older        Passed - Normal serum creatinine on file in past 12 months     Recent Labs   Lab Test 01/07/22 1935   CR 1.11       Ok to refill medication if creatinine is low          Passed - Normal serum potassium on file in past 12 months     Recent Labs   Lab Test 01/07/22 1935   POTASSIUM 4.1                         Freya Fletcher, RN 12/28/22 6:09 PM  "

## 2023-01-22 ENCOUNTER — HEALTH MAINTENANCE LETTER (OUTPATIENT)
Age: 43
End: 2023-01-22

## 2023-03-03 DIAGNOSIS — Z79.4 TYPE 2 DIABETES MELLITUS WITH OTHER SKIN COMPLICATION, WITH LONG-TERM CURRENT USE OF INSULIN (H): ICD-10-CM

## 2023-03-03 DIAGNOSIS — E11.628 TYPE 2 DIABETES MELLITUS WITH OTHER SKIN COMPLICATION, WITH LONG-TERM CURRENT USE OF INSULIN (H): ICD-10-CM

## 2023-03-06 PROBLEM — E11.9 DIABETES MELLITUS, TYPE 2 (H): Status: ACTIVE | Noted: 2021-10-01

## 2023-03-06 RX ORDER — INSULIN GLARGINE 100 [IU]/ML
INJECTION, SOLUTION SUBCUTANEOUS
Qty: 15 ML | Refills: 3 | Status: SHIPPED | OUTPATIENT
Start: 2023-03-06 | End: 2023-03-08

## 2023-03-08 DIAGNOSIS — E11.628 TYPE 2 DIABETES MELLITUS WITH OTHER SKIN COMPLICATION, WITH LONG-TERM CURRENT USE OF INSULIN (H): ICD-10-CM

## 2023-03-08 DIAGNOSIS — Z79.4 TYPE 2 DIABETES MELLITUS WITH OTHER SKIN COMPLICATION, WITH LONG-TERM CURRENT USE OF INSULIN (H): ICD-10-CM

## 2023-03-09 RX ORDER — INSULIN GLARGINE 100 [IU]/ML
INJECTION, SOLUTION SUBCUTANEOUS
Refills: 3 | OUTPATIENT
Start: 2023-03-09

## 2023-03-09 NOTE — TELEPHONE ENCOUNTER
"Duplicate. Refill already completed.     insulin glargine (BASAGLAR KWIKPEN) 100 UNIT/ML pen 15 mL 3 3/8/2023  No   Sig: INJECT 40 UNITS SUBCUTANEOUS DAILY         Requested Prescriptions   Pending Prescriptions Disp Refills     insulin glargine (BASAGLAR KWIKPEN) 100 UNIT/ML pen [Pharmacy Med Name: BASAGLAR 100 UNIT/ML KWIKPEN]  3     Sig: INJECT 40 UNITS SUBCUTANEOUS DAILY       Long Acting Insulin Protocol Failed - 3/8/2023 10:50 AM        Failed - Serum creatinine on file in past 12 months     Recent Labs   Lab Test 01/07/22  1935   CR 1.11       Ok to refill medication if creatinine is low          Failed - HgbA1C in past 3 or 6 months     If HgbA1C is 8 or greater, it needs to be on file within the past 3 months.  If less than 8, must be on file within the past 6 months.     Recent Labs   Lab Test 04/08/22  1035   A1C 7.1*             Passed - Medication is active on med list        Passed - Patient is age 18 or older        Passed - Recent (6 mo) or future (30 days) visit within the authorizing provider's specialty     Patient had office visit in the last 6 months or has a visit in the next 30 days with authorizing provider or within the authorizing provider's specialty.  See \"Patient Info\" tab in inbasket, or \"Choose Columns\" in Meds & Orders section of the refill encounter.                 Sabine Duran RN 03/09/23 3:20 PM  "

## 2023-03-09 NOTE — TELEPHONE ENCOUNTER
FYI - Status Update    Who is Calling: patient    Update: Received Phone Call from [facility] for patient to contact PCP    Unable to fulfill RX for RENAN ROSE  Patient shares he really did not understand Pharmacist, but they told him they need more information in order for insurance to pay for medication.    Asked if they requested Prior Authorization  - replied no.  They need to know why it is medically necessary.    Patient states he is very low and needs insurance to cover so he can take medication.     Does caller want a call/response back: Yes     Could we send this information to you in [employer] or would you prefer to receive a phone call?:   Patient would prefer a phone call   Okay to leave a detailed message?: Yes at Cell number on file:    Telephone Information:   Mobile [phone]

## 2023-03-10 RX ORDER — INSULIN GLARGINE 100 [IU]/ML
40 INJECTION, SOLUTION SUBCUTANEOUS DAILY
Qty: 15 ML | Refills: 3 | Status: SHIPPED | OUTPATIENT
Start: 2023-03-10 | End: 2023-04-12

## 2023-03-10 NOTE — TELEPHONE ENCOUNTER
Spoke to Amanda at [facility]. Amanda stated that patient's current insurance no longer covers Basaglar.    They are requiring PA for Basaglar or a new prescription stating it is okay to substitute with any covered brand such as Lantus or Semglee.    Will route to PCP to review and advise if PA or new rx with substitution.    Dariana Olmos, LULAN, RN  [facility]

## 2023-04-06 DIAGNOSIS — E11.628 TYPE 2 DIABETES MELLITUS WITH OTHER SKIN COMPLICATION, WITH LONG-TERM CURRENT USE OF INSULIN (H): ICD-10-CM

## 2023-04-06 DIAGNOSIS — Z79.4 TYPE 2 DIABETES MELLITUS WITH OTHER SKIN COMPLICATION, WITH LONG-TERM CURRENT USE OF INSULIN (H): ICD-10-CM

## 2023-04-06 DIAGNOSIS — I10 ESSENTIAL (PRIMARY) HYPERTENSION: ICD-10-CM

## 2023-04-06 RX ORDER — AMLODIPINE BESYLATE 10 MG/1
TABLET ORAL
Qty: 90 TABLET | Refills: 3 | Status: SHIPPED | OUTPATIENT
Start: 2023-04-06 | End: 2024-03-25

## 2023-04-06 RX ORDER — ATORVASTATIN CALCIUM 40 MG/1
TABLET, FILM COATED ORAL
Qty: 90 TABLET | Refills: 3 | Status: SHIPPED | OUTPATIENT
Start: 2023-04-06 | End: 2024-03-25

## 2023-04-06 NOTE — TELEPHONE ENCOUNTER
"Routing refill request to provider for review/approval because:  Labs not current:  Creatinine    Last Written Prescription Date:  2/1/22  Last Fill Quantity: 90,  # refills: 3   Last office visit provider:  4/8/22     Requested Prescriptions   Pending Prescriptions Disp Refills     amLODIPine (NORVASC) 10 MG tablet [Pharmacy Med Name: AMLODIPINE BESYLATE 10 MG TAB] 90 tablet 3     Sig: TAKE 1 TABLET BY MOUTH EVERY DAY. INCREASED DOSE       Calcium Channel Blockers Protocol  Failed - 4/6/2023  2:41 PM        Failed - Normal serum creatinine on file in past 12 months     Recent Labs   Lab Test 01/07/22  1935   CR 1.11       Ok to refill medication if creatinine is low          Passed - Blood pressure under 140/90 in past 12 months     BP Readings from Last 3 Encounters:   08/13/22 133/86   04/08/22 112/82   02/21/22 134/83                 Passed - Recent (12 mo) or future (30 days) visit within the authorizing provider's specialty     Patient has had an office visit with the authorizing provider or a provider within the authorizing providers department within the previous 12 mos or has a future within next 30 days. See \"Patient Info\" tab in inbasket, or \"Choose Columns\" in Meds & Orders section of the refill encounter.              Passed - Medication is active on med list        Passed - Patient is age 18 or older             CHATO CORNEJO RN 04/06/23 2:41 PM  "

## 2023-04-06 NOTE — TELEPHONE ENCOUNTER
"Routing refill request to provider for review/approval because:  Labs not current:  LDL    Last Written Prescription Date:  4/8/22  Last Fill Quantity: 90,  # refills: 3   Last office visit provider:   4/8/22    Requested Prescriptions   Pending Prescriptions Disp Refills     atorvastatin (LIPITOR) 40 MG tablet [Pharmacy Med Name: ATORVASTATIN 40 MG TABLET] 90 tablet 3     Sig: TAKE 1 TABLET BY MOUTH EVERY DAY       Statins Protocol Failed - 4/6/2023  2:42 PM        Failed - LDL on file in past 12 months     Recent Labs   Lab Test 10/01/21  0900   LDL 91             Passed - No abnormal creatine kinase in past 12 months     Recent Labs   Lab Test 12/27/19  0724                   Passed - Recent (12 mo) or future (30 days) visit within the authorizing provider's specialty     Patient has had an office visit with the authorizing provider or a provider within the authorizing providers department within the previous 12 mos or has a future within next 30 days. See \"Patient Info\" tab in inbasket, or \"Choose Columns\" in Meds & Orders section of the refill encounter.              Passed - Medication is active on med list        Passed - Patient is age 18 or older             CHATO CORNEJO RN 04/06/23 2:42 PM  "

## 2023-04-08 ENCOUNTER — OFFICE VISIT (OUTPATIENT)
Dept: FAMILY MEDICINE | Facility: CLINIC | Age: 43
End: 2023-04-08
Payer: COMMERCIAL

## 2023-04-08 VITALS
BODY MASS INDEX: 54.11 KG/M2 | WEIGHT: 315 LBS | DIASTOLIC BLOOD PRESSURE: 97 MMHG | OXYGEN SATURATION: 98 % | SYSTOLIC BLOOD PRESSURE: 161 MMHG | TEMPERATURE: 98.4 F | HEART RATE: 85 BPM

## 2023-04-08 DIAGNOSIS — S39.012A BACK STRAIN, INITIAL ENCOUNTER: Primary | ICD-10-CM

## 2023-04-08 PROCEDURE — 99213 OFFICE O/P EST LOW 20 MIN: CPT | Performed by: PHYSICIAN ASSISTANT

## 2023-04-08 RX ORDER — NAPROXEN 500 MG/1
500 TABLET ORAL 2 TIMES DAILY WITH MEALS
Qty: 40 TABLET | Refills: 0 | Status: SHIPPED | OUTPATIENT
Start: 2023-04-08 | End: 2023-05-12

## 2023-04-08 RX ORDER — CYCLOBENZAPRINE HCL 5 MG
TABLET ORAL
Qty: 30 TABLET | Refills: 0 | Status: SHIPPED | OUTPATIENT
Start: 2023-04-08 | End: 2023-05-12

## 2023-04-08 NOTE — PATIENT INSTRUCTIONS
"(S39.937A) Back strain, initial encounter  (primary encounter diagnosis)  Comment:   Plan: naproxen (NAPROSYN) 500 MG tablet,         cyclobenzaprine (FLEXERIL) 5 MG tablet,         acetaminophen-codeine (TYLENOL #3) 300-30 MG         tablet            See handouts.  Use  heat to area for 10-15 minutes to \"warm up muscles\", then gentle stretches followed by ice to area for 20 minutes.  Do this twice a day, or more if time allows.  It should feel relieving.        "
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
## 2023-04-11 DIAGNOSIS — E11.628 TYPE 2 DIABETES MELLITUS WITH OTHER SKIN COMPLICATION, WITH LONG-TERM CURRENT USE OF INSULIN (H): ICD-10-CM

## 2023-04-11 DIAGNOSIS — Z79.4 TYPE 2 DIABETES MELLITUS WITH OTHER SKIN COMPLICATION, WITH LONG-TERM CURRENT USE OF INSULIN (H): ICD-10-CM

## 2023-04-12 RX ORDER — INSULIN GLARGINE-YFGN 100 [IU]/ML
INJECTION, SOLUTION SUBCUTANEOUS
Qty: 15 ML | Refills: 3 | Status: SHIPPED | OUTPATIENT
Start: 2023-04-12 | End: 2023-05-12

## 2023-04-12 NOTE — TELEPHONE ENCOUNTER
Routing refill request to provider for review/approval because:  Drug not on the G refill protocol   Please see pharmacy note.  Patient needs to be seen because it has been more than 1 year since last office visit.    Last Written Prescription Date:  3/10/23  Last Fill Quantity: 15 ml,  # refills: 3   Last office visit provider:  4/8/22     Requested Prescriptions   Pending Prescriptions Disp Refills     SEMGLEE (YFGN) 100 UNIT/ML SOPN [Pharmacy Med Name: SEMGLEE (YFGN) 100 UNIT/ML PEN]  3     Sig: INJECT 40 UNITS SUBCUTANEOUS DAILY       There is no refill protocol information for this order          Nicola Woods RN 04/12/23 4:07 PM

## 2023-04-30 ENCOUNTER — HEALTH MAINTENANCE LETTER (OUTPATIENT)
Age: 43
End: 2023-04-30

## 2023-05-06 DIAGNOSIS — I10 ESSENTIAL HYPERTENSION: ICD-10-CM

## 2023-05-07 RX ORDER — METOPROLOL TARTRATE 100 MG
TABLET ORAL
Qty: 180 TABLET | Refills: 1 | Status: SHIPPED | OUTPATIENT
Start: 2023-05-07 | End: 2023-09-29

## 2023-05-07 NOTE — TELEPHONE ENCOUNTER
"Routing refill request to provider for review/approval because:  Patient needs to be seen because it has been more than 1 year since last office visit.  Blood pressure failed    Last Written Prescription Date:  11/16/2022  Last Fill Quantity: 180,  # refills: 1   Last office visit provider:  4/8/2022     Requested Prescriptions   Pending Prescriptions Disp Refills     metoprolol tartrate (LOPRESSOR) 100 MG tablet [Pharmacy Med Name: METOPROLOL TARTRATE 100 MG TAB] 180 tablet 1     Sig: TAKE 1 TABLET BY MOUTH TWICE A DAY       Beta-Blockers Protocol Failed - 5/6/2023 12:59 PM        Failed - Blood pressure under 140/90 in past 12 months     BP Readings from Last 3 Encounters:   04/08/23 (!) 161/97   08/13/22 133/86   04/08/22 112/82                 Passed - Patient is age 6 or older        Passed - Recent (12 mo) or future (30 days) visit within the authorizing provider's specialty     Patient has had an office visit with the authorizing provider or a provider within the authorizing providers department within the previous 12 mos or has a future within next 30 days. See \"Patient Info\" tab in inbasket, or \"Choose Columns\" in Meds & Orders section of the refill encounter.              Passed - Medication is active on med list             Vanessa Gillis RN 05/07/23 10:12 AM  "

## 2023-05-11 ASSESSMENT — PATIENT HEALTH QUESTIONNAIRE - PHQ9
SUM OF ALL RESPONSES TO PHQ QUESTIONS 1-9: 6
SUM OF ALL RESPONSES TO PHQ QUESTIONS 1-9: 6
10. IF YOU CHECKED OFF ANY PROBLEMS, HOW DIFFICULT HAVE THESE PROBLEMS MADE IT FOR YOU TO DO YOUR WORK, TAKE CARE OF THINGS AT HOME, OR GET ALONG WITH OTHER PEOPLE: SOMEWHAT DIFFICULT

## 2023-05-12 ENCOUNTER — OFFICE VISIT (OUTPATIENT)
Dept: FAMILY MEDICINE | Facility: CLINIC | Age: 43
End: 2023-05-12
Payer: COMMERCIAL

## 2023-05-12 VITALS
TEMPERATURE: 98 F | WEIGHT: 315 LBS | SYSTOLIC BLOOD PRESSURE: 132 MMHG | OXYGEN SATURATION: 95 % | HEIGHT: 73 IN | DIASTOLIC BLOOD PRESSURE: 80 MMHG | BODY MASS INDEX: 41.75 KG/M2 | RESPIRATION RATE: 18 BRPM | HEART RATE: 74 BPM

## 2023-05-12 DIAGNOSIS — I10 ESSENTIAL HYPERTENSION: ICD-10-CM

## 2023-05-12 DIAGNOSIS — E11.628 TYPE 2 DIABETES MELLITUS WITH OTHER SKIN COMPLICATION, WITH LONG-TERM CURRENT USE OF INSULIN (H): Primary | ICD-10-CM

## 2023-05-12 DIAGNOSIS — R80.9 MICROALBUMINURIA: ICD-10-CM

## 2023-05-12 DIAGNOSIS — E66.01 MORBID OBESITY (H): ICD-10-CM

## 2023-05-12 DIAGNOSIS — Z79.4 TYPE 2 DIABETES MELLITUS WITH OTHER SKIN COMPLICATION, WITH LONG-TERM CURRENT USE OF INSULIN (H): Primary | ICD-10-CM

## 2023-05-12 DIAGNOSIS — E78.5 DYSLIPIDEMIA: ICD-10-CM

## 2023-05-12 DIAGNOSIS — Z23 ENCOUNTER FOR IMMUNIZATION: ICD-10-CM

## 2023-05-12 LAB
ALBUMIN SERPL BCG-MCNC: 4.3 G/DL (ref 3.5–5.2)
ALP SERPL-CCNC: 60 U/L (ref 40–129)
ALT SERPL W P-5'-P-CCNC: 41 U/L (ref 10–50)
ANION GAP SERPL CALCULATED.3IONS-SCNC: 13 MMOL/L (ref 7–15)
AST SERPL W P-5'-P-CCNC: 31 U/L (ref 10–50)
BILIRUB SERPL-MCNC: 0.6 MG/DL
BUN SERPL-MCNC: 21.4 MG/DL (ref 6–20)
CALCIUM SERPL-MCNC: 9.4 MG/DL (ref 8.6–10)
CHLORIDE SERPL-SCNC: 103 MMOL/L (ref 98–107)
CHOLEST SERPL-MCNC: 107 MG/DL
CREAT SERPL-MCNC: 0.95 MG/DL (ref 0.67–1.17)
CREAT UR-MCNC: 151 MG/DL
DEPRECATED HCO3 PLAS-SCNC: 21 MMOL/L (ref 22–29)
GFR SERPL CREATININE-BSD FRML MDRD: >90 ML/MIN/1.73M2
GLUCOSE SERPL-MCNC: 161 MG/DL (ref 70–99)
HBA1C MFR BLD: 8.8 % (ref 0–5.6)
HDLC SERPL-MCNC: 43 MG/DL
HOLD SPECIMEN: NORMAL
LDLC SERPL CALC-MCNC: 45 MG/DL
MICROALBUMIN UR-MCNC: 156 MG/L
MICROALBUMIN/CREAT UR: 103.31 MG/G CR (ref 0–17)
NONHDLC SERPL-MCNC: 64 MG/DL
POTASSIUM SERPL-SCNC: 4.3 MMOL/L (ref 3.4–5.3)
PROT SERPL-MCNC: 7.8 G/DL (ref 6.4–8.3)
SODIUM SERPL-SCNC: 137 MMOL/L (ref 136–145)
TRIGL SERPL-MCNC: 93 MG/DL

## 2023-05-12 PROCEDURE — 80061 LIPID PANEL: CPT | Performed by: FAMILY MEDICINE

## 2023-05-12 PROCEDURE — 82043 UR ALBUMIN QUANTITATIVE: CPT | Performed by: FAMILY MEDICINE

## 2023-05-12 PROCEDURE — 82570 ASSAY OF URINE CREATININE: CPT | Performed by: FAMILY MEDICINE

## 2023-05-12 PROCEDURE — 90471 IMMUNIZATION ADMIN: CPT | Performed by: FAMILY MEDICINE

## 2023-05-12 PROCEDURE — 83036 HEMOGLOBIN GLYCOSYLATED A1C: CPT | Performed by: FAMILY MEDICINE

## 2023-05-12 PROCEDURE — 80053 COMPREHEN METABOLIC PANEL: CPT | Performed by: FAMILY MEDICINE

## 2023-05-12 PROCEDURE — 90677 PCV20 VACCINE IM: CPT | Performed by: FAMILY MEDICINE

## 2023-05-12 PROCEDURE — 99214 OFFICE O/P EST MOD 30 MIN: CPT | Mod: 25 | Performed by: FAMILY MEDICINE

## 2023-05-12 PROCEDURE — 36415 COLL VENOUS BLD VENIPUNCTURE: CPT | Performed by: FAMILY MEDICINE

## 2023-05-12 ASSESSMENT — PAIN SCALES - GENERAL: PAINLEVEL: NO PAIN (0)

## 2023-05-12 NOTE — PROGRESS NOTES
Assessment/Plan:    Type 2 diabetes mellitus with other skin complication, with long-term current use of insulin (H)  Type 2 diabetes suboptimal control with A1c increasing from 7.1% up to 8.8% today.  Patient declines use of Ozempic etc. due to cost considerations and will continue Lantus insulin however increase from 40 units up to 50 units daily while remaining on metformin  mg using 2 tablets twice daily.  Reassess at follow-up in approximately 3 months.  Comprehensive weight management program referral was also provided to patient.  Diabetic eye exam referral given.  Medication monitoring.  - Hemoglobin A1c  - Adult Eye  Referral  - insulin glargine (LANTUS PEN) 100 UNIT/ML pen  Dispense: 60 mL; Refill: 3  - Comprehensive metabolic panel    Microalbuminuria  Continues use of losartan 100 mg daily for renal protective benefits with microalbuminuria historically with prior ratio of 232.  - Albumin Random Urine Quantitative with Creat Ratio    Essential hypertension  Blood pressure near goal with amlodipine 10 mg daily, metoprolol tartrate 100 mg twice daily and losartan 100 mg daily.  - Comprehensive metabolic panel    Dyslipidemia  Continue use of atorvastatin 40 mg daily  - Lipid panel reflex to direct LDL Non-fasting    Morbid obesity (H)  Dietary and exercise modifications for weight goal less than 300 pounds ideally.  - Adult Comprehensive Weight Management  Referral    Encounter for immunization  Pneumococcal 20 vaccination provided today.  - Pneumococcal 20 Valent Conjugate (Prevnar 20)               Subjective:    Lm Leigh is seen today for follow-up evaluation.  Had change of insurance previously and was seen elsewhere and now returning to office.  Prior A1c of 7.1% April 8, 2022.  Dietary indiscretions.  Continued attempts at weight loss noted.  Insulin changes with difficulty often getting insulin refilled through pharmacy.  Using Lantus currently at 40 units daily.   Metformin  mg using 2 tablets twice daily.  Amlodipine 10 mg daily, metoprolol tartrate 100 mg twice daily and losartan 100 mg daily.  History of microalbuminuria.  Atorvastatin 40 mg daily for lipid management.  Tolerated well.  Has not had eye exam in a couple years.  Comprehensive review of systems as above otherwise all negative.    Single   No children   Tobacco:  none   EtOH:  never (by choice) - grandparents with EtOH issue   Mom - type 2 diabetes   Dad -   1 younger sis -   Surgeries:  none   Hospitalizations:  right groin drainage at [facility] after admission ([facility]) with infection into testicle...   Work:  [employer]   Hobbies:  collect autographs    No past surgical history on file.     No family history on file.     Past Medical History:   Diagnosis Date     Hypertension      Obesity         Social History     Tobacco Use     Smoking status: Never     Smokeless tobacco: Never   Substance Use Topics     Alcohol use: Not Currently     Drug use: Not Currently        Current Outpatient Medications   Medication Sig Dispense Refill     amLODIPine (NORVASC) 10 MG tablet TAKE 1 TABLET BY MOUTH EVERY DAY. INCREASED DOSE 90 tablet 3     aspirin (ASA) 81 MG EC tablet Take 81 mg by mouth daily       atorvastatin (LIPITOR) 40 MG tablet TAKE 1 TABLET BY MOUTH EVERY DAY 90 tablet 3     BD PEN NEEDLE ZION 2ND GEN 32G X 4 MM miscellaneous USE TO INJECT INSULIN ONCE DAILY 100 each 11     blood glucose (NO BRAND SPECIFIED) lancets standard Use to check blood sugars three times daily. Dispense brand per patient's insurance at pharmacy discretion.       blood glucose (PRECISION QID TEST) test strip Use to check blood sugars three times daily. Strips compatible with Accuchek guide. Dispense brand per patient's insurance at pharmacy discretion.       insulin glargine (LANTUS PEN) 100 UNIT/ML pen Inject 50 Units Subcutaneous At Bedtime 60 mL 3     losartan (COZAAR) 100 MG tablet TAKE 1 TABLET BY MOUTH EVERY DAY 90  "tablet 3     metFORMIN (GLUCOPHAGE XR) 500 MG 24 hr tablet TAKE 2 TABLETS BY MOUTH 2 TIMES DAILY WITH MEALS 360 tablet 3     metoprolol tartrate (LOPRESSOR) 100 MG tablet TAKE 1 TABLET BY MOUTH TWICE A  tablet 1          Objective:    Vitals:    05/12/23 1049   BP: 132/80   BP Location: Right arm   Patient Position: Sitting   Cuff Size: Adult Large   Pulse: 74   Resp: 18   Temp: 98  F (36.7  C)   TempSrc: Oral   SpO2: 95%   Weight: (!) 173.6 kg (382 lb 11.2 oz)   Height: 1.865 m (6' 1.43\")      Body mass index is 49.91 kg/m .    Alert.  No apparent distress with BMI 49.91.  Chest clear.  Cardiac exam regular.  Extremities warm and dry.      This note has been dictated using voice recognition software and as a result may contain minor grammatical errors and unintended word substitutions.         Answers for HPI/ROS submitted by the patient on 5/11/2023  If you checked off any problems, how difficult have these problems made it for you to do your work, take care of things at home, or get along with other people?: Somewhat difficult  PHQ9 TOTAL SCORE: 6  Frequency of checking blood sugars:: three times daily  What time of day are you checking your blood sugars : before and after meals, at bedtime  Have you had any blood sugars above 200?: No  Have you had any blood sugars below 70?: No  Hypoglycemia symptoms:: none  Diabetic concerns:: other  Paraesthesia present:: none of these symptoms  Have you had a diabetic eye exam within the last year?: No  How many servings of fruits and vegetables do you eat daily?: 2-3  On average, how many sweetened beverages do you drink each day (Examples: soda, juice, sweet tea, etc.  Do NOT count diet or artificially sweetened beverages)?: 0  How many minutes a day do you exercise enough to make your heart beat faster?: 30 to 60  How many days a week do you exercise enough to make your heart beat faster?: 4  How many days per week do you miss taking your medication?: 0      "

## 2023-06-30 ENCOUNTER — MYC MEDICAL ADVICE (OUTPATIENT)
Dept: FAMILY MEDICINE | Facility: CLINIC | Age: 43
End: 2023-06-30
Payer: COMMERCIAL

## 2023-06-30 NOTE — LETTER
July 21, 2023        Lm Leigh  [address] [address]        Dear Lm Leigh    We see that you are due for an eye exam.  Eye problems are a serious complication of diabetes and we want to make sure that your eyes are healthy.   Diabetes can lead to diabetic retinopathy, diabetic macular edema and blindness.  Please schedule an eye exam with an eye professional (ophthamology or optometrist) and let your PCP here at [facility] aware once it is completed so the results get in your record.    If you have had a recent eye exam, in the past year, please call the clinic at [phone] or send us a MyChart and inform us of the location and date that your eye exam was completed so we can update your records. If you have not had an eye exam in the past year we recommend you call your eye clinic to schedule one.    Thank you!    Deanna George, LULAN, RN  [facility]

## 2023-07-18 NOTE — TELEPHONE ENCOUNTER
Writer called patient regarding diabetic eye exam.     Unable to leave a VM due to no VM box.  If patient returns call please inform patient they are overdue for an eye exam and ask if they have been seen elsewhere for these services in the past year. If so please get the date and location of last eye exam and route information back to writer.    CAMILLE Person, RN  [facility]

## 2023-07-21 NOTE — TELEPHONE ENCOUNTER
Patient Quality Outreach    Patient is due for the following:   Diabetes -  Eye Exam    Next Steps:   Letter sent to patient to schedule eye exam    Type of outreach:    Phone, left message for patient/parent to call back. and Sent [employer] message.    Next Steps:  Reach out within 90 days via Letter.    Max number of attempts reached: Yes. Will try again in 90 days if patient still on fail list.    Questions for provider review:    None           Deanna George RN

## 2023-07-24 DIAGNOSIS — E11.65 TYPE 2 DIABETES MELLITUS WITH HYPERGLYCEMIA (H): ICD-10-CM

## 2023-07-25 RX ORDER — PEN NEEDLE, DIABETIC 32GX 5/32"
NEEDLE, DISPOSABLE MISCELLANEOUS
Qty: 100 EACH | Refills: 1 | Status: SHIPPED | OUTPATIENT
Start: 2023-07-25 | End: 2024-01-29

## 2023-07-25 NOTE — TELEPHONE ENCOUNTER
"Last Written Prescription Date:  6/8/2022  Last Fill Quantity: 100,  # refills: 11   Last office visit provider:  5/12/2023     Requested Prescriptions   Pending Prescriptions Disp Refills    BD PEN NEEDLE ZION 2ND GEN 32G X 4 MM miscellaneous [Pharmacy Med Name: BD ZION 2 GEN PEN NDL 32G 4MM] 100 each 11     Sig: USE TO INJECT INSULIN ONCE DAILY       Diabetic Supplies Protocol Passed - 7/25/2023 10:36 AM        Passed - Medication is active on med list        Passed - Patient is 18 years of age or older        Passed - Recent (6 mo) or future (30 days) visit within the authorizing provider's specialty     Patient had office visit in the last 6 months or has a visit in the next 30 days with authorizing provider.  See \"Patient Info\" tab in inbasket, or \"Choose Columns\" in Meds & Orders section of the refill encounter.                 Brandy Hines RN 07/25/23 10:36 AM  "

## 2023-09-29 DIAGNOSIS — I10 ESSENTIAL HYPERTENSION: ICD-10-CM

## 2023-09-29 DIAGNOSIS — E11.65 TYPE 2 DIABETES MELLITUS WITH HYPERGLYCEMIA, WITHOUT LONG-TERM CURRENT USE OF INSULIN (H): ICD-10-CM

## 2023-09-29 RX ORDER — METOPROLOL TARTRATE 100 MG
TABLET ORAL
Qty: 180 TABLET | Refills: 1 | Status: SHIPPED | OUTPATIENT
Start: 2023-09-29 | End: 2024-03-25

## 2023-09-29 RX ORDER — METFORMIN HCL 500 MG
1000 TABLET, EXTENDED RELEASE 24 HR ORAL 2 TIMES DAILY WITH MEALS
Qty: 360 TABLET | Refills: 1 | Status: SHIPPED | OUTPATIENT
Start: 2023-09-29 | End: 2024-03-25

## 2023-10-02 ENCOUNTER — TRANSFERRED RECORDS (OUTPATIENT)
Dept: HEALTH INFORMATION MANAGEMENT | Facility: CLINIC | Age: 43
End: 2023-10-02
Payer: COMMERCIAL

## 2023-10-07 ENCOUNTER — HEALTH MAINTENANCE LETTER (OUTPATIENT)
Age: 43
End: 2023-10-07

## 2023-10-19 ENCOUNTER — OFFICE VISIT (OUTPATIENT)
Dept: FAMILY MEDICINE | Facility: CLINIC | Age: 43
End: 2023-10-19
Payer: COMMERCIAL

## 2023-10-19 VITALS
OXYGEN SATURATION: 98 % | RESPIRATION RATE: 20 BRPM | SYSTOLIC BLOOD PRESSURE: 148 MMHG | HEART RATE: 80 BPM | TEMPERATURE: 98.2 F | DIASTOLIC BLOOD PRESSURE: 78 MMHG

## 2023-10-19 DIAGNOSIS — H60.391 SKIN OF RIGHT EARLOBE WITH INFECTION: Primary | ICD-10-CM

## 2023-10-19 PROCEDURE — 99213 OFFICE O/P EST LOW 20 MIN: CPT | Performed by: NURSE PRACTITIONER

## 2023-10-19 RX ORDER — MUPIROCIN 20 MG/G
OINTMENT TOPICAL 3 TIMES DAILY
Qty: 15 G | Refills: 0 | Status: SHIPPED | OUTPATIENT
Start: 2023-10-19 | End: 2023-10-26

## 2023-10-19 NOTE — PATIENT INSTRUCTIONS
Try topical mupirocin for up to 1 week.you can rub the ointment in and then leave the area open.  Avoid dunking your head underwater until better.  Okay for showers.      Tylenol if needed.      Recheck if worsening pain, swelling, redness or if not better after 1 week.

## 2023-10-19 NOTE — PROGRESS NOTES
Assessment & Plan     Skin of right earlobe with infection    - mupirocin (BACTROBAN) 2 % external ointment  Dispense: 15 g; Refill: 0     Patient with black irritated spot to the right earlobe that he scratched and noted to have an area of induration in this area this morning.  No obvious abscess.  Area is tannish and less than 1/2 cm in diameter.  Potentially a very early bacterial/staph infection in this area.     Amenable to trying topical mupirocin for up to 1 week, OTC pain medicine.  Recheck if worsening pain, swelling, redness or if not better after 1 week.          No follow-ups on file.    Juliet Gómez, [facility] SHALOM Manrique is a 42 year old male who presents to clinic today for the following health issues:  Chief Complaint   Patient presents with    Ear Problem     R ear pain black spot painful since this yesterday      Ear Problem        Earlobe area black spot that he noticed couple of days ago.  No injury.  Thinks he may have scratched it and then the area became more firm overnight.  Is a little tender to touch.  No drainage.          Review of Systems   HENT:  Positive for ear pain.            Objective    BP (!) 148/78   Pulse 80   Temp 98.2  F (36.8  C) (Tympanic)   Resp 20   SpO2 98%   Physical Exam  HENT:      Ears:        Comments: Area of induration located on the right earlobe.  No surrounding erythema.  Tannish color change.  No swelling.

## 2023-10-31 DIAGNOSIS — I10 ESSENTIAL (PRIMARY) HYPERTENSION: ICD-10-CM

## 2023-11-01 RX ORDER — LOSARTAN POTASSIUM 100 MG/1
TABLET ORAL
Qty: 90 TABLET | Refills: 3 | Status: SHIPPED | OUTPATIENT
Start: 2023-11-01

## 2024-01-26 DIAGNOSIS — E11.65 TYPE 2 DIABETES MELLITUS WITH HYPERGLYCEMIA (H): ICD-10-CM

## 2024-01-29 RX ORDER — PEN NEEDLE, DIABETIC 32GX 5/32"
NEEDLE, DISPOSABLE MISCELLANEOUS
Qty: 100 EACH | Refills: 0 | Status: SHIPPED | OUTPATIENT
Start: 2024-01-29 | End: 2024-05-01

## 2024-02-02 ENCOUNTER — OFFICE VISIT (OUTPATIENT)
Dept: FAMILY MEDICINE | Facility: CLINIC | Age: 44
End: 2024-02-02
Payer: COMMERCIAL

## 2024-02-02 VITALS
BODY MASS INDEX: 50.82 KG/M2 | RESPIRATION RATE: 18 BRPM | DIASTOLIC BLOOD PRESSURE: 92 MMHG | HEART RATE: 86 BPM | TEMPERATURE: 98.4 F | WEIGHT: 315 LBS | OXYGEN SATURATION: 97 % | SYSTOLIC BLOOD PRESSURE: 149 MMHG

## 2024-02-02 DIAGNOSIS — L03.115 CELLULITIS OF RIGHT LOWER EXTREMITY: Primary | ICD-10-CM

## 2024-02-02 PROCEDURE — 99213 OFFICE O/P EST LOW 20 MIN: CPT

## 2024-02-02 RX ORDER — CEPHALEXIN 500 MG/1
500 CAPSULE ORAL 4 TIMES DAILY
Qty: 20 CAPSULE | Refills: 0 | Status: SHIPPED | OUTPATIENT
Start: 2024-02-02 | End: 2024-02-28

## 2024-02-02 ASSESSMENT — ENCOUNTER SYMPTOMS
FEVER: 0
WOUND: 1

## 2024-02-02 NOTE — PROGRESS NOTES
"Assessment & Plan     Cellulitis of right lower extremity  Patient has a circular wound on his right anterior lower leg that has been present for about 1 week. Reports he scraped his leg on a stair while he was wearing long pants a few weeks ago, and those lesions healed as he applied bacitracin ointment and a Band-Aid. However, about 1 week ago he was removing the Band-Aid and it ripped an area of skin off just lateral to the original wound. Since then, he has been covering this new area with gauze and bacitracin ointment. However, it has been healing slow and now has a surrounding area of erythema. States it looks \"more red and angry\". Likely infected, but no drainage was able to be expressed from the wound. Will treat like simple cellulitis with Keflex for 5 days. I educated the patient on ED precautions if the erythema begins to rapidly advance or if the pain becomes increasingly worse.   - cephALEXin (KEFLEX) 500 MG capsule  Dispense: 20 capsule; Refill: 0    Mckinley Lacey DO  [facility]    Marycarmen Manrique is a 43 year old male who presents to clinic today for the following health issues:  Chief Complaint   Patient presents with    Knee Problem     Rt leg calf wound redness and tender. Warmth to the touch.      Patient presents for evaluation of a right anterior leg wound and surrounding redness. Reports he scraped the area on a stair a few weeks ago and treated the abrasions with bacitracin ointment and Band-Aids. This abrasion healed, but when he removed the Band-Aid about 1 week ago, it tore some skin medial to the original wound, and that area has since developed some surrounding redness, is tender, and slightly warm to the touch. He is diabetic and has a history of skin infections. He has been covering the area with gauze and bacitracin ointment during the day, and leaving it open at night.       Review of Systems   Constitutional:  Negative for fever.   Skin:  Positive for " rash and wound.   All other systems reviewed and are negative.        Objective    BP (!) 149/92   Pulse 86   Temp 98.4  F (36.9  C) (Oral)   Resp 18   Wt (!) 176.8 kg (389 lb 11.2 oz)   SpO2 97%   BMI 50.82 kg/m    Physical Exam  Vitals reviewed.   Constitutional:       Appearance: Normal appearance. He is obese.   HENT:      Head: Normocephalic and atraumatic.      Right Ear: External ear normal.      Left Ear: External ear normal.   Eyes:      Extraocular Movements: Extraocular movements intact.      Conjunctiva/sclera: Conjunctivae normal.   Pulmonary:      Effort: Pulmonary effort is normal. No respiratory distress.   Skin:     General: Skin is warm.      Findings: Erythema and lesion present.      Comments: 2-3 cm circular traumatic wound over the anterior right shin with overlying scab. Small area of surrounding erythema that is slightly warm to the touch. Tender to palpation.   Hyperpigmentation of bilateral anterior shins suggestive of venous insufficiency   Neurological:      General: No focal deficit present.      Mental Status: He is alert and oriented to person, place, and time. Mental status is at baseline.   Psychiatric:         Mood and Affect: Mood normal.         Behavior: Behavior normal.         Thought Content: Thought content normal.         Judgment: Judgment normal.

## 2024-02-02 NOTE — PATIENT INSTRUCTIONS
Thank you for coming in to see us today!    - Take the antibiotic 4 times per day for 5 days  - Okay to continue applying the bacitracin ointment and keep it covered during the day. Okay to leave uncovered at night.   - Follow up with your primary in 1 week if no improvement.     Mckinley Lacey, DO    4 = completely dependent

## 2024-02-18 ENCOUNTER — HEALTH MAINTENANCE LETTER (OUTPATIENT)
Age: 44
End: 2024-02-18

## 2024-02-28 ENCOUNTER — OFFICE VISIT (OUTPATIENT)
Dept: FAMILY MEDICINE | Facility: CLINIC | Age: 44
End: 2024-02-28
Payer: COMMERCIAL

## 2024-02-28 VITALS
OXYGEN SATURATION: 97 % | HEIGHT: 73 IN | TEMPERATURE: 97.7 F | DIASTOLIC BLOOD PRESSURE: 86 MMHG | WEIGHT: 315 LBS | HEART RATE: 85 BPM | RESPIRATION RATE: 17 BRPM | BODY MASS INDEX: 41.75 KG/M2 | SYSTOLIC BLOOD PRESSURE: 150 MMHG

## 2024-02-28 DIAGNOSIS — R80.9 TYPE 2 DIABETES MELLITUS WITH MICROALBUMINURIA, WITH LONG-TERM CURRENT USE OF INSULIN (H): Primary | ICD-10-CM

## 2024-02-28 DIAGNOSIS — I10 ESSENTIAL HYPERTENSION: ICD-10-CM

## 2024-02-28 DIAGNOSIS — E66.01 MORBID OBESITY (H): ICD-10-CM

## 2024-02-28 DIAGNOSIS — E11.29 TYPE 2 DIABETES MELLITUS WITH MICROALBUMINURIA, WITH LONG-TERM CURRENT USE OF INSULIN (H): Primary | ICD-10-CM

## 2024-02-28 DIAGNOSIS — Z79.4 TYPE 2 DIABETES MELLITUS WITH MICROALBUMINURIA, WITH LONG-TERM CURRENT USE OF INSULIN (H): Primary | ICD-10-CM

## 2024-02-28 DIAGNOSIS — R80.9 MICROALBUMINURIA: ICD-10-CM

## 2024-02-28 DIAGNOSIS — E78.5 DYSLIPIDEMIA: ICD-10-CM

## 2024-02-28 LAB
HBA1C MFR BLD: 9.4 % (ref 0–5.6)
HOLD SPECIMEN: NORMAL

## 2024-02-28 PROCEDURE — 99214 OFFICE O/P EST MOD 30 MIN: CPT | Performed by: FAMILY MEDICINE

## 2024-02-28 PROCEDURE — 80061 LIPID PANEL: CPT | Performed by: FAMILY MEDICINE

## 2024-02-28 PROCEDURE — 82570 ASSAY OF URINE CREATININE: CPT | Performed by: FAMILY MEDICINE

## 2024-02-28 PROCEDURE — 83036 HEMOGLOBIN GLYCOSYLATED A1C: CPT | Performed by: FAMILY MEDICINE

## 2024-02-28 PROCEDURE — 36415 COLL VENOUS BLD VENIPUNCTURE: CPT | Performed by: FAMILY MEDICINE

## 2024-02-28 PROCEDURE — 80053 COMPREHEN METABOLIC PANEL: CPT | Performed by: FAMILY MEDICINE

## 2024-02-28 PROCEDURE — 82043 UR ALBUMIN QUANTITATIVE: CPT | Performed by: FAMILY MEDICINE

## 2024-02-28 RX ORDER — BLOOD SUGAR DIAGNOSTIC
STRIP MISCELLANEOUS
Qty: 300 STRIP | Refills: 3 | Status: SHIPPED | OUTPATIENT
Start: 2024-02-28

## 2024-02-28 ASSESSMENT — PAIN SCALES - GENERAL: PAINLEVEL: NO PAIN (0)

## 2024-02-28 NOTE — PROGRESS NOTES
Assessment/Plan:    Type 2 diabetes mellitus with microalbuminuria, with long-term current use of insulin (H)  Type 2 diabetes reviewed.  A1c worsening from 8.8% to 9.4%.  2 pound weight gain since May 12, 2023.  Patient states blood sugars relatively well-controlled between 98 and 120 4 in the morning and wants to continue Lantus 50 units daily with metformin  mg using 2 tablets twice daily.  Will initiated Ozempic quarter milligram weekly x 4 weeks then half milligram weekly until follow-up as scheduled May 28, 2024.  Update microalbumin screen.  Diabetic eye exams annually.  - Hemoglobin A1c  - Comprehensive metabolic panel  - blood glucose (PRECISION QID TEST) test strip  Dispense: 300 strip; Refill: 3  - semaglutide (OZEMPIC) 2 MG/3ML pen  Dispense: 3 mL; Refill: 4  - Comprehensive metabolic panel    Essential hypertension  Hypertension fair control blood pressure 136/84 and continues amlodipine 10 mg daily, losartan 100 mg daily and metoprolol to tartrate 100 mg twice daily.  - Comprehensive metabolic panel  - Comprehensive metabolic panel    Essential (primary) hypertension  As above.  Lab assessment completed.  - Comprehensive metabolic panel  - Comprehensive metabolic panel    Microalbuminuria  Update microalbumin screen today.  Does remain on angiotensin receptor blocker for renal protective benefits.  - Albumin Random Urine Quantitative with Creat Ratio  - Comprehensive metabolic panel  - Albumin Random Urine Quantitative with Creat Ratio  - Comprehensive metabolic panel    Dyslipidemia  Continues use of atorvastatin 40 mg daily.  Nonfasting with a single cracker this morning otherwise notify with results with weight goal remaining less than 300 pounds initially.  Patient should schedule with weight management program through [facility] per his insurance requirement.  - Lipid panel reflex to direct LDL Non-fasting  - Lipid panel reflex to direct LDL Non-fasting    Morbid obesity (H)  Schedule  weight management program evaluation through [facility] per insurance requirement otherwise initiate Ozempic quarter milligram weekly x 4 weeks and half milligram weekly until follow-up as scheduled May 28, 2024.  - semaglutide (OZEMPIC) 2 MG/3ML pen  Dispense: 3 mL; Refill: 4               Subjective:    KunGIOVANI Leigh is seen today for follow-up assessment.  Type 2 diabetes reviewed.  Lantus insulin 50 units daily with metformin  mg using 2 tablets twice daily.  Low-dose aspirin 81 mg daily continuing.  No bleeding concerns.  Atorvastatin 40 mg daily for lipid management.  Utilizing losartan 100 mg daily, amlodipine 10 mg daily and metoprolol to tartrate 100 mg twice daily for hypertension management as well.  States blood sugars at home typically ranging between 98 and 124 recently in the morning and does not want to adjust Lantus insulin at this time.  Had placed referral previously for comprehensive weight management however states that his [facility] and patient was resistant to this initially.  Would be interested in GLP-1 medication in order to achieve desired glycemic improvement and weight loss.  Comprehensive review of systems as above otherwise all negative.    Single   No children   Tobacco:  none   EtOH:  never (by choice) - grandparents with EtOH issue   Mom - type 2 diabetes   Dad -   1 younger sis -   Surgeries:  none   Hospitalizations:  right groin drainage at [facility] after admission ([facility]) with infection into testicle...   Work:  warehouse   Hobbies:  collect autographs     History reviewed. No pertinent surgical history.     History reviewed. No pertinent family history.     Past Medical History:   Diagnosis Date    Hypertension     Obesity         Social History     Tobacco Use    Smoking status: Never    Smokeless tobacco: Never   Substance Use Topics    Alcohol use: Not Currently    Drug use: Not Currently        Current  "Outpatient Medications   Medication Sig Dispense Refill    amLODIPine (NORVASC) 10 MG tablet TAKE 1 TABLET BY MOUTH EVERY DAY. INCREASED DOSE 90 tablet 3    aspirin (ASA) 81 MG EC tablet Take 81 mg by mouth daily      atorvastatin (LIPITOR) 40 MG tablet TAKE 1 TABLET BY MOUTH EVERY DAY 90 tablet 3    blood glucose (NO BRAND SPECIFIED) lancets standard Use to check blood sugars three times daily. Dispense brand per patient's insurance at pharmacy discretion.      blood glucose (PRECISION QID TEST) test strip Use to check blood sugars three times daily. Strips compatible with Accuchek guide. Dispense brand per patient's insurance at pharmacy discretion. 300 strip 3    insulin glargine (LANTUS PEN) 100 UNIT/ML pen Inject 50 Units Subcutaneous At Bedtime 60 mL 3    insulin pen needle (BD PEN NEEDLE ZION 2ND GEN) 32G X 4 MM miscellaneous USE TO INJECT INSULIN ONCE DAILY 100 each 0    losartan (COZAAR) 100 MG tablet TAKE 1 TABLET BY MOUTH EVERY DAY 90 tablet 3    metFORMIN (GLUCOPHAGE XR) 500 MG 24 hr tablet TAKE 2 TABLETS BY MOUTH TWICE A DAY WITH MEALS 360 tablet 1    metoprolol tartrate (LOPRESSOR) 100 MG tablet TAKE 1 TABLET BY MOUTH TWICE A  tablet 1    semaglutide (OZEMPIC) 2 MG/3ML pen Inject 0.25 mg Subcutaneous every 7 days x 4 weeks, then increase to 0.5 mg subcutaneous weekly 3 mL 4          Objective:    Vitals:    02/28/24 0812 02/28/24 0814   BP: (!) 168/90 (!) 150/86   Pulse: 85    Resp: 17    Temp: 97.7  F (36.5  C)    SpO2: 97%    Weight: (!) 174.2 kg (384 lb)    Height: 1.865 m (6' 1.43\")       Body mass index is 50.07 kg/m .    Alert.  No apparent distress with blood pressure 136/84 on recheck.  Cardiac exam otherwise regular.  Extremities warm and dry.  BMI 50.07.      This note has been dictated using voice recognition software and as a result may contain minor grammatical errors and unintended word substitutions.           Answers submitted by the patient for this visit:  Diabetes Visit " (Submitted on 2/25/2024)  Chief Complaint: Chronic problems general questions HPI Form  Frequency of checking blood sugars:: three times daily  What time of day are you checking your blood sugars : before meals, at bedtime  Have you had any blood sugars above 200?: No  Have you had any blood sugars below 70?: No  Hypoglycemia symptoms:: none  Diabetic concerns:: other  Paraesthesia present:: none of these symptoms  General Questionnaire (Submitted on 2/25/2024)  Chief Complaint: Chronic problems general questions HPI Form  How many servings of fruits and vegetables do you eat daily?: 2-3  On average, how many sweetened beverages do you drink each day (Examples: soda, juice, sweet tea, etc.  Do NOT count diet or artificially sweetened beverages)?: 0  How many minutes a day do you exercise enough to make your heart beat faster?: 30 to 60  How many days a week do you exercise enough to make your heart beat faster?: 4  How many days per week do you miss taking your medication?: 0

## 2024-02-29 LAB
ALBUMIN SERPL BCG-MCNC: 4.3 G/DL (ref 3.5–5.2)
ALP SERPL-CCNC: 66 U/L (ref 40–150)
ALT SERPL W P-5'-P-CCNC: 37 U/L (ref 0–70)
ANION GAP SERPL CALCULATED.3IONS-SCNC: 10 MMOL/L (ref 7–15)
AST SERPL W P-5'-P-CCNC: 27 U/L (ref 0–45)
BILIRUB SERPL-MCNC: 0.4 MG/DL
BUN SERPL-MCNC: 23.1 MG/DL (ref 6–20)
CALCIUM SERPL-MCNC: 9.4 MG/DL (ref 8.6–10)
CHLORIDE SERPL-SCNC: 105 MMOL/L (ref 98–107)
CHOLEST SERPL-MCNC: 107 MG/DL
CREAT SERPL-MCNC: 0.77 MG/DL (ref 0.67–1.17)
DEPRECATED HCO3 PLAS-SCNC: 22 MMOL/L (ref 22–29)
EGFRCR SERPLBLD CKD-EPI 2021: >90 ML/MIN/1.73M2
GLUCOSE SERPL-MCNC: 171 MG/DL (ref 70–99)
HDLC SERPL-MCNC: 38 MG/DL
LDLC SERPL CALC-MCNC: 44 MG/DL
NONHDLC SERPL-MCNC: 69 MG/DL
POTASSIUM SERPL-SCNC: 4.1 MMOL/L (ref 3.4–5.3)
PROT SERPL-MCNC: 7.6 G/DL (ref 6.4–8.3)
SODIUM SERPL-SCNC: 137 MMOL/L (ref 135–145)
TRIGL SERPL-MCNC: 125 MG/DL

## 2024-03-01 LAB
CREAT UR-MCNC: 125 MG/DL
MICROALBUMIN UR-MCNC: 602 MG/L
MICROALBUMIN/CREAT UR: 481.6 MG/G CR (ref 0–17)

## 2024-03-23 DIAGNOSIS — E11.628 TYPE 2 DIABETES MELLITUS WITH OTHER SKIN COMPLICATION, WITH LONG-TERM CURRENT USE OF INSULIN (H): ICD-10-CM

## 2024-03-23 DIAGNOSIS — Z79.4 TYPE 2 DIABETES MELLITUS WITH OTHER SKIN COMPLICATION, WITH LONG-TERM CURRENT USE OF INSULIN (H): ICD-10-CM

## 2024-03-23 DIAGNOSIS — I10 ESSENTIAL (PRIMARY) HYPERTENSION: ICD-10-CM

## 2024-03-24 DIAGNOSIS — E11.65 TYPE 2 DIABETES MELLITUS WITH HYPERGLYCEMIA, WITHOUT LONG-TERM CURRENT USE OF INSULIN (H): ICD-10-CM

## 2024-03-24 DIAGNOSIS — I10 ESSENTIAL HYPERTENSION: ICD-10-CM

## 2024-03-25 RX ORDER — AMLODIPINE BESYLATE 10 MG/1
TABLET ORAL
Qty: 90 TABLET | Refills: 3 | Status: SHIPPED | OUTPATIENT
Start: 2024-03-25

## 2024-03-25 RX ORDER — ATORVASTATIN CALCIUM 40 MG/1
TABLET, FILM COATED ORAL
Qty: 90 TABLET | Refills: 2 | Status: SHIPPED | OUTPATIENT
Start: 2024-03-25

## 2024-03-25 RX ORDER — METOPROLOL TARTRATE 100 MG
TABLET ORAL
Qty: 180 TABLET | Refills: 1 | Status: SHIPPED | OUTPATIENT
Start: 2024-03-25 | End: 2024-09-10

## 2024-03-25 RX ORDER — METFORMIN HCL 500 MG
1000 TABLET, EXTENDED RELEASE 24 HR ORAL 2 TIMES DAILY WITH MEALS
Qty: 360 TABLET | Refills: 0 | Status: SHIPPED | OUTPATIENT
Start: 2024-03-25 | End: 2024-06-04

## 2024-03-28 ENCOUNTER — OFFICE VISIT (OUTPATIENT)
Dept: FAMILY MEDICINE | Facility: CLINIC | Age: 44
End: 2024-03-28
Payer: COMMERCIAL

## 2024-03-28 VITALS
DIASTOLIC BLOOD PRESSURE: 93 MMHG | TEMPERATURE: 98.1 F | RESPIRATION RATE: 24 BRPM | OXYGEN SATURATION: 96 % | HEART RATE: 94 BPM | SYSTOLIC BLOOD PRESSURE: 161 MMHG

## 2024-03-28 DIAGNOSIS — J30.9 ALLERGIC RHINITIS, UNSPECIFIED SEASONALITY, UNSPECIFIED TRIGGER: Primary | ICD-10-CM

## 2024-03-28 DIAGNOSIS — R07.0 THROAT PAIN: ICD-10-CM

## 2024-03-28 DIAGNOSIS — J34.3 NASAL TURBINATE HYPERTROPHY: ICD-10-CM

## 2024-03-28 LAB
DEPRECATED S PYO AG THROAT QL EIA: NEGATIVE
GROUP A STREP BY PCR: NOT DETECTED

## 2024-03-28 PROCEDURE — 87651 STREP A DNA AMP PROBE: CPT | Performed by: PHYSICIAN ASSISTANT

## 2024-03-28 PROCEDURE — 99214 OFFICE O/P EST MOD 30 MIN: CPT | Performed by: PHYSICIAN ASSISTANT

## 2024-03-28 RX ORDER — LEVOCETIRIZINE DIHYDROCHLORIDE 5 MG/1
5 TABLET, FILM COATED ORAL EVERY EVENING
Qty: 30 TABLET | Refills: 3 | Status: SHIPPED | OUTPATIENT
Start: 2024-03-28

## 2024-03-28 RX ORDER — FLUTICASONE PROPIONATE 50 MCG
1 SPRAY, SUSPENSION (ML) NASAL DAILY
Qty: 18.2 ML | Refills: 3 | Status: SHIPPED | OUTPATIENT
Start: 2024-03-28 | End: 2024-05-20

## 2024-03-28 NOTE — PATIENT INSTRUCTIONS
(J30.9) Allergic rhinitis, unspecified seasonality, unspecified trigger  (primary encounter diagnosis)  Comment:   Plan: fluticasone (FLONASE) 50 MCG/ACT nasal spray,         levocetirizine (XYZAL) 5 MG tablet          Flonase 2 sprays each nostril at bedtime every night for MINIMUM of 2 weeks.      Use saline nasal spray during the day to cleanse and moisturize nasal passages.      Take levocetirizine nightly for the next 4-6 weeks.  Consider taking every allergy season in future.      (R07.0) Throat pain  Comment: secondary to post nasal drip  Plan: Streptococcus A Rapid Screen w/Reflex to PCR -         Clinic Collect, Group A Streptococcus PCR         Throat Swab, levocetirizine (XYZAL) 5 MG tablet        Elevate head of bed.  Take Xyzal (levocetirizine)      (J34.3) Nasal turbinate hypertrophy  Comment: secondary to swelling.   Plan: fluticasone (FLONASE) 50 MCG/ACT nasal spray        2 sprays each nostril TWICE today then just at bedtime every night as above.     Rapid strep test is negative, culture should be back tomorrow we will contact you if you need further treatment.

## 2024-03-28 NOTE — PROGRESS NOTES
Patient presents with:  Pharyngitis: X last Sunday. Little cough to clear, pt states getting worse. No SOB/wheezing. Little on/off chills, low grade fever. Some throat pain.    (J30.9) Allergic rhinitis, unspecified seasonality, unspecified trigger  (primary encounter diagnosis)  Comment:   Plan: fluticasone (FLONASE) 50 MCG/ACT nasal spray,         levocetirizine (XYZAL) 5 MG tablet          Flonase 2 sprays each nostril at bedtime every night for MINIMUM of 2 weeks.      Use saline nasal spray during the day to cleanse and moisturize nasal passages.      Take levocetirizine nightly for the next 4-6 weeks.  Consider taking every allergy season in future.      (R07.0) Throat pain  Comment: secondary to post nasal drip  Plan: Streptococcus A Rapid Screen w/Reflex to PCR -         Clinic Collect, Group A Streptococcus PCR         Throat Swab, levocetirizine (XYZAL) 5 MG tablet        Elevate head of bed.  Take Xyzal (levocetirizine)  Tylenol or ibuprofen prn.     (J34.3) Nasal turbinate hypertrophy  Comment: secondary to swelling.   Plan: fluticasone (FLONASE) 50 MCG/ACT nasal spray        2 sprays each nostril TWICE today then just at bedtime every night as above.     Rapid strep test is negative, culture should be back tomorrow we will contact you if you need further treatment.        At the end of the encounter, I discussed results, diagnosis, medications. Discussed red flags for immediate return to clinic/ER, as well as indications for follow up if no improvement. Patient understood and agreed to plan. Patient was stable for discharge     If not improving or if condition worsens, follow up with your Primary Care Provider            SUBJECTIVE:   Lm Leigh is a 43 year old male who presents today with throat pain for the past 4 days.  No fevers.  Post nasal drip.  No significant cough or body aches, doesn't really fell sick, but has had strep throat frequently in the past.      Does have history of sinus  infections but this does not feel like one today.    Patient Active Problem List   Diagnosis    Diabetes mellitus, type 2 (H)    Morbid obesity (H)    Essential (primary) hypertension    Microalbuminuria    Dyslipidemia         Past Medical History:   Diagnosis Date    Hypertension     Obesity          Current Outpatient Medications   Medication Sig Dispense Refill    Multiple Vitamins-Iron (DAILY-RICHARD/IRON/BETA-CAROTENE) TABS TAKE 1 TABLET BY MOUTH DAILY. (Patient not taking: Reported on 10/19/2020) 30 tablet 7     Social History     Tobacco Use    Smoking status: Never Smoker    Smokeless tobacco: Never Used   Substance Use Topics    Alcohol use: Not on file     Family History   Problem Relation Age of Onset    Diabetes Mother     Diabetes Father          ROS:    10 point ROS of systems including Constitutional, Eyes, Respiratory, Cardiovascular, Gastroenterology, Genitourinary, Integumentary, Muscularskeletal, Psychiatric ,neurological were all negative except for pertinent positives noted in my HPI       OBJECTIVE:  BP (!) 161/93   Pulse 94   Temp 98.1  F (36.7  C) (Oral)   Resp 24   SpO2 96%   Physical Exam:  GENERAL APPEARANCE: healthy, alert and no distress  EYES: EOMI,  PERRL, conjunctiva clear  HENT: ear canals and TM's normal.  Nose and mouth without ulcers, erythema or lesions  HENT: nasal turbinates boggy with bluish hue and rhinorrhea clear  NECK: supple, nontender, no lymphadenopathy  RESP: lungs clear to auscultation - no rales, rhonchi or wheezes  CV: regular rates and rhythm, normal S1 S2, no murmur noted  NEURO: Normal strength and tone, sensory exam grossly normal,  normal speech and mentation  SKIN: no suspicious lesions or rashes    Results for orders placed or performed in visit on 03/28/24   Streptococcus A Rapid Screen w/Reflex to PCR - Clinic Collect     Status: Normal    Specimen: Throat; Swab   Result Value Ref Range    Group A Strep antigen Negative Negative

## 2024-04-13 DIAGNOSIS — E11.65 TYPE 2 DIABETES MELLITUS WITH HYPERGLYCEMIA, WITHOUT LONG-TERM CURRENT USE OF INSULIN (H): ICD-10-CM

## 2024-04-15 RX ORDER — METFORMIN HCL 500 MG
1000 TABLET, EXTENDED RELEASE 24 HR ORAL 2 TIMES DAILY WITH MEALS
Qty: 360 TABLET | Refills: 0 | OUTPATIENT
Start: 2024-04-15

## 2024-04-30 DIAGNOSIS — Z79.4 TYPE 2 DIABETES MELLITUS WITH OTHER SKIN COMPLICATION, WITH LONG-TERM CURRENT USE OF INSULIN (H): ICD-10-CM

## 2024-04-30 DIAGNOSIS — E11.628 TYPE 2 DIABETES MELLITUS WITH OTHER SKIN COMPLICATION, WITH LONG-TERM CURRENT USE OF INSULIN (H): ICD-10-CM

## 2024-04-30 DIAGNOSIS — E11.65 TYPE 2 DIABETES MELLITUS WITH HYPERGLYCEMIA, WITHOUT LONG-TERM CURRENT USE OF INSULIN (H): ICD-10-CM

## 2024-04-30 DIAGNOSIS — E11.65 TYPE 2 DIABETES MELLITUS WITH HYPERGLYCEMIA (H): ICD-10-CM

## 2024-05-01 RX ORDER — INSULIN GLARGINE 100 [IU]/ML
50 INJECTION, SOLUTION SUBCUTANEOUS AT BEDTIME
Qty: 45 ML | Refills: 5 | Status: SHIPPED | OUTPATIENT
Start: 2024-05-01 | End: 2024-06-04

## 2024-05-01 RX ORDER — METFORMIN HCL 500 MG
1000 TABLET, EXTENDED RELEASE 24 HR ORAL 2 TIMES DAILY WITH MEALS
Qty: 360 TABLET | Refills: 0 | OUTPATIENT
Start: 2024-05-01

## 2024-05-01 RX ORDER — PEN NEEDLE, DIABETIC 32GX 5/32"
NEEDLE, DISPOSABLE MISCELLANEOUS
Qty: 100 EACH | Refills: 0 | Status: SHIPPED | OUTPATIENT
Start: 2024-05-01 | End: 2024-08-05

## 2024-05-19 DIAGNOSIS — J30.9 ALLERGIC RHINITIS, UNSPECIFIED SEASONALITY, UNSPECIFIED TRIGGER: ICD-10-CM

## 2024-05-19 DIAGNOSIS — J34.3 NASAL TURBINATE HYPERTROPHY: ICD-10-CM

## 2024-05-20 RX ORDER — FLUTICASONE PROPIONATE 50 MCG
1 SPRAY, SUSPENSION (ML) NASAL DAILY
Qty: 18.2 ML | Refills: 1 | Status: SHIPPED | OUTPATIENT
Start: 2024-05-20

## 2024-06-04 ENCOUNTER — OFFICE VISIT (OUTPATIENT)
Dept: FAMILY MEDICINE | Facility: CLINIC | Age: 44
End: 2024-06-04
Payer: COMMERCIAL

## 2024-06-04 VITALS
WEIGHT: 315 LBS | OXYGEN SATURATION: 96 % | DIASTOLIC BLOOD PRESSURE: 81 MMHG | TEMPERATURE: 97.4 F | RESPIRATION RATE: 16 BRPM | BODY MASS INDEX: 47.85 KG/M2 | HEART RATE: 81 BPM | SYSTOLIC BLOOD PRESSURE: 130 MMHG

## 2024-06-04 DIAGNOSIS — I10 ESSENTIAL (PRIMARY) HYPERTENSION: ICD-10-CM

## 2024-06-04 DIAGNOSIS — R80.9 MICROALBUMINURIA: ICD-10-CM

## 2024-06-04 DIAGNOSIS — E78.5 DYSLIPIDEMIA: ICD-10-CM

## 2024-06-04 DIAGNOSIS — R80.9 TYPE 2 DIABETES MELLITUS WITH DIABETIC MICROALBUMINURIA, WITHOUT LONG-TERM CURRENT USE OF INSULIN (H): Primary | ICD-10-CM

## 2024-06-04 DIAGNOSIS — E11.29 TYPE 2 DIABETES MELLITUS WITH DIABETIC MICROALBUMINURIA, WITHOUT LONG-TERM CURRENT USE OF INSULIN (H): Primary | ICD-10-CM

## 2024-06-04 LAB
HBA1C MFR BLD: 6.9 % (ref 0–5.6)
HOLD SPECIMEN: NORMAL

## 2024-06-04 PROCEDURE — 36415 COLL VENOUS BLD VENIPUNCTURE: CPT | Performed by: FAMILY MEDICINE

## 2024-06-04 PROCEDURE — 80048 BASIC METABOLIC PNL TOTAL CA: CPT | Performed by: FAMILY MEDICINE

## 2024-06-04 PROCEDURE — 99214 OFFICE O/P EST MOD 30 MIN: CPT | Performed by: FAMILY MEDICINE

## 2024-06-04 PROCEDURE — G2211 COMPLEX E/M VISIT ADD ON: HCPCS | Performed by: FAMILY MEDICINE

## 2024-06-04 PROCEDURE — 83036 HEMOGLOBIN GLYCOSYLATED A1C: CPT | Performed by: FAMILY MEDICINE

## 2024-06-04 RX ORDER — METFORMIN HCL 500 MG
1000 TABLET, EXTENDED RELEASE 24 HR ORAL 2 TIMES DAILY WITH MEALS
Qty: 360 TABLET | Refills: 3 | Status: SHIPPED | OUTPATIENT
Start: 2024-06-04

## 2024-06-04 RX ORDER — INSULIN GLARGINE 100 [IU]/ML
50 INJECTION, SOLUTION SUBCUTANEOUS AT BEDTIME
Qty: 45 ML | Refills: 5 | Status: SHIPPED | OUTPATIENT
Start: 2024-06-04

## 2024-06-04 ASSESSMENT — PAIN SCALES - GENERAL: PAINLEVEL: NO PAIN (0)

## 2024-06-05 LAB
ANION GAP SERPL CALCULATED.3IONS-SCNC: 14 MMOL/L (ref 7–15)
BUN SERPL-MCNC: 20.7 MG/DL (ref 6–20)
CALCIUM SERPL-MCNC: 9.6 MG/DL (ref 8.6–10)
CHLORIDE SERPL-SCNC: 102 MMOL/L (ref 98–107)
CREAT SERPL-MCNC: 0.86 MG/DL (ref 0.67–1.17)
DEPRECATED HCO3 PLAS-SCNC: 20 MMOL/L (ref 22–29)
EGFRCR SERPLBLD CKD-EPI 2021: >90 ML/MIN/1.73M2
GLUCOSE SERPL-MCNC: 88 MG/DL (ref 70–99)
POTASSIUM SERPL-SCNC: 4.7 MMOL/L (ref 3.4–5.3)
SODIUM SERPL-SCNC: 136 MMOL/L (ref 135–145)

## 2024-07-08 ENCOUNTER — OFFICE VISIT (OUTPATIENT)
Dept: FAMILY MEDICINE | Facility: CLINIC | Age: 44
End: 2024-07-08
Payer: COMMERCIAL

## 2024-07-08 VITALS
SYSTOLIC BLOOD PRESSURE: 141 MMHG | TEMPERATURE: 98.2 F | RESPIRATION RATE: 16 BRPM | DIASTOLIC BLOOD PRESSURE: 83 MMHG | HEART RATE: 95 BPM | OXYGEN SATURATION: 97 %

## 2024-07-08 DIAGNOSIS — H92.01 OTALGIA, RIGHT: Primary | ICD-10-CM

## 2024-07-08 DIAGNOSIS — H61.23 BILATERAL IMPACTED CERUMEN: ICD-10-CM

## 2024-07-08 PROCEDURE — 99213 OFFICE O/P EST LOW 20 MIN: CPT | Mod: 25 | Performed by: NURSE PRACTITIONER

## 2024-07-08 PROCEDURE — 69209 REMOVE IMPACTED EAR WAX UNI: CPT | Mod: 50 | Performed by: NURSE PRACTITIONER

## 2024-07-08 RX ORDER — OFLOXACIN 3 MG/ML
10 SOLUTION AURICULAR (OTIC) DAILY
Qty: 10 ML | Refills: 0 | Status: SHIPPED | OUTPATIENT
Start: 2024-07-08 | End: 2024-07-15

## 2024-07-08 NOTE — PROGRESS NOTES
Assessment & Plan     Otalgia, right    - ofloxacin (FLOXIN) 0.3 % otic solution  Dispense: 10 mL; Refill: 0    Bilateral impacted cerumen         Patient with bilateral cerumen impactions.  Symptoms completely resolved in the left ear.  Continuing to have a bit of pain with palpation of the tragus on the right, but feels like the symptoms have mostly resolved.  Patient does use ear Q-tips frequently and could have otitis externa as well in the right ear.  Did give a as needed ofloxacin paper prescription to use if symptoms do not resolve in the next 24 hours.    Discussed keeping ears dry and how to use drops.  Recheck in about 3 days if not much better if use the drops.    Cerumen removal - procedure:     Ear flush was used to remove cerumen from both ear(s) by medical assistant    No immediate complications noted.  Reports relief of symptoms following.      Juliet Gómez CNP            No follow-ups on file.    Juliet Gómez CNP  [facility] SHALOM Manrique is a 43 year old male who presents to clinic today for the following health issues:  Chief Complaint   Patient presents with    Ear Problem     Rt ear pain night of 07/04/2024. Ear itchy.       Ear Problem        Patient with bilateral ear itching ongoing for a while with history of frequent cerumen impactions.  Starting in July 4, the right ear started to become more painful as well.  He is a frequent user of Q-tips.  Patient says he knows that the Q-tips can cause ear problems but uses them anyway.          Review of Systems   HENT:  Positive for ear pain.        Denies cough or congestion.        Objective    BP (!) 141/83   Pulse 95   Temp 98.2  F (36.8  C) (Oral)   Resp 16   SpO2 97%   Physical Exam  Constitutional:       Appearance: Normal appearance.   HENT:      Right Ear: Tympanic membrane and ear canal normal. There is impacted cerumen.      Left Ear: Tympanic membrane and ear canal normal. There is impacted  cerumen.      Ears:      Comments: Tender over right tragus.  No pain with movement of right external ear.    After ear flush, left ear is asymptomatic  Pulmonary:      Effort: Pulmonary effort is normal.   Neurological:      Mental Status: He is alert and oriented to person, place, and time.   Psychiatric:         Mood and Affect: Mood normal.

## 2024-07-08 NOTE — PATIENT INSTRUCTIONS
Instead of Q-tips, you can use an over-the-counter product called Debrox to help remove wax if you feel like you are getting wax in your ears.    You might have an ear canal infection which can cause pain, canal swelling and itching.  If your symptoms are not much better by tomorrow evening, recommend trying the ofloxacin eardrops.     Installation of ear drops:    Lie on your side for three to five minutes after using drops.    Avoid swimming for 7 days.         Keep ears dry if you use drops.  See handout for information.  See information on swimmer's ear.    If you are feeling better, okay to hold off.

## 2024-08-05 DIAGNOSIS — E11.65 TYPE 2 DIABETES MELLITUS WITH HYPERGLYCEMIA (H): ICD-10-CM

## 2024-08-05 RX ORDER — PEN NEEDLE, DIABETIC 32GX 5/32"
NEEDLE, DISPOSABLE MISCELLANEOUS
Qty: 100 EACH | Refills: 2 | Status: SHIPPED | OUTPATIENT
Start: 2024-08-05

## 2024-08-23 ENCOUNTER — MYC MEDICAL ADVICE (OUTPATIENT)
Dept: FAMILY MEDICINE | Facility: CLINIC | Age: 44
End: 2024-08-23
Payer: COMMERCIAL

## 2024-08-23 NOTE — TELEPHONE ENCOUNTER
Patient Quality Outreach    Patient is due for the following:   Hypertension -  BP check    Next Steps:   Patient needs to check their blood pressure and send blood pressure readings into the clinic for a provider to review.    Type of outreach:    Sent [employer] message.      Questions for provider review:    None           Stephanie Walters RN

## 2024-09-09 DIAGNOSIS — I10 ESSENTIAL HYPERTENSION: ICD-10-CM

## 2024-09-10 RX ORDER — METOPROLOL TARTRATE 100 MG
TABLET ORAL
Qty: 180 TABLET | Refills: 0 | Status: SHIPPED | OUTPATIENT
Start: 2024-09-10

## 2024-09-15 ENCOUNTER — HEALTH MAINTENANCE LETTER (OUTPATIENT)
Age: 44
End: 2024-09-15

## 2024-10-16 DIAGNOSIS — R80.9 TYPE 2 DIABETES MELLITUS WITH DIABETIC MICROALBUMINURIA, WITHOUT LONG-TERM CURRENT USE OF INSULIN (H): ICD-10-CM

## 2024-10-16 DIAGNOSIS — E11.29 TYPE 2 DIABETES MELLITUS WITH DIABETIC MICROALBUMINURIA, WITHOUT LONG-TERM CURRENT USE OF INSULIN (H): ICD-10-CM

## 2024-10-17 RX ORDER — SEMAGLUTIDE 1.34 MG/ML
INJECTION, SOLUTION SUBCUTANEOUS
Refills: 1 | OUTPATIENT
Start: 2024-10-17

## 2024-11-12 ENCOUNTER — MYC REFILL (OUTPATIENT)
Dept: FAMILY MEDICINE | Facility: CLINIC | Age: 44
End: 2024-11-12
Payer: COMMERCIAL

## 2024-11-12 ENCOUNTER — MYC MEDICAL ADVICE (OUTPATIENT)
Dept: FAMILY MEDICINE | Facility: CLINIC | Age: 44
End: 2024-11-12
Payer: COMMERCIAL

## 2024-11-12 DIAGNOSIS — E11.29 TYPE 2 DIABETES MELLITUS WITH DIABETIC MICROALBUMINURIA, WITHOUT LONG-TERM CURRENT USE OF INSULIN (H): ICD-10-CM

## 2024-11-12 DIAGNOSIS — R80.9 TYPE 2 DIABETES MELLITUS WITH DIABETIC MICROALBUMINURIA, WITHOUT LONG-TERM CURRENT USE OF INSULIN (H): ICD-10-CM

## 2024-11-12 NOTE — TELEPHONE ENCOUNTER
Patient Quality Outreach    Patient is due for the following:   Hypertension -  BP check    Action(s) Taken:   Patient needs to check their blood pressure and send their blood pressure readings into the clinic for a provider to review.    Type of outreach:    Sent [employer] message.    Questions for provider review:    None           Stephanie Walters RN

## 2024-11-24 ENCOUNTER — HEALTH MAINTENANCE LETTER (OUTPATIENT)
Age: 44
End: 2024-11-24

## 2024-12-17 DIAGNOSIS — E11.628 TYPE 2 DIABETES MELLITUS WITH OTHER SKIN COMPLICATION, WITH LONG-TERM CURRENT USE OF INSULIN (H): ICD-10-CM

## 2024-12-17 DIAGNOSIS — Z79.4 TYPE 2 DIABETES MELLITUS WITH OTHER SKIN COMPLICATION, WITH LONG-TERM CURRENT USE OF INSULIN (H): ICD-10-CM

## 2024-12-17 RX ORDER — ATORVASTATIN CALCIUM 40 MG/1
TABLET, FILM COATED ORAL
Qty: 90 TABLET | Refills: 0 | Status: SHIPPED | OUTPATIENT
Start: 2024-12-17

## 2024-12-18 DIAGNOSIS — I10 ESSENTIAL (PRIMARY) HYPERTENSION: ICD-10-CM

## 2024-12-18 DIAGNOSIS — I10 ESSENTIAL HYPERTENSION: ICD-10-CM

## 2024-12-18 RX ORDER — LOSARTAN POTASSIUM 100 MG/1
TABLET ORAL
Qty: 90 TABLET | Refills: 3 | Status: SHIPPED | OUTPATIENT
Start: 2024-12-18

## 2024-12-18 RX ORDER — METOPROLOL TARTRATE 100 MG/1
TABLET ORAL
Qty: 180 TABLET | Refills: 0 | Status: SHIPPED | OUTPATIENT
Start: 2024-12-18

## 2025-01-11 ENCOUNTER — HEALTH MAINTENANCE LETTER (OUTPATIENT)
Age: 45
End: 2025-01-11

## 2025-02-03 ENCOUNTER — MYC MEDICAL ADVICE (OUTPATIENT)
Dept: FAMILY MEDICINE | Facility: CLINIC | Age: 45
End: 2025-02-03
Payer: COMMERCIAL

## 2025-02-03 NOTE — TELEPHONE ENCOUNTER
Patient Quality Outreach    Patient is due for the following:   Hypertension -  BP check    Action(s) Taken:   Patient needs to check their blood pressure and send their blood pressure readings into the clinic for a provider to review.    Type of outreach:    Sent [employer] message.    Questions for provider review:    None           Stephanie Walters RN

## 2025-02-24 NOTE — TELEPHONE ENCOUNTER
[facility]        NAME: Farideh Carr is a 23 y.o. female  : 2001    MRN: [id number]  DATE: 2025  TIME: 11:14 AM    Assessment and Plan   Acute URI [J06.9]  1. Acute URI  methylPREDNISolone 4 MG tablet therapy pack    benzonatate (TESSALON) 200 MG capsule        Decongestants recommended for congestion. Hydration, humidifier, rest. No signs of bacterial infection today. Follow up with PCP in 3-5 days if no improvement. Proceed to ER if symptoms worsen.    Medical Decision Making     PROBLEM: 1 acute, uncomplicated illness or injury    DATA: Note(s) Reviewed: yes, chart review    RISK: Prescription drug management    TIME: 20 min     Chief Complaint     Chief Complaint   Patient presents with    Sore Throat     C/o sore throat, cough, chest congestion and headaches x 5 days.      History of Present Illness     Farideh Carr is a 23 y.o. female presenting to the office today for upper respiratory complaints.   Symptoms have been present for 5 days, and include cough, congestion, headaches.   She has tried dayquil, nyquil for her symptoms, some relief.  Sick contacts include: friends at school    Review of Systems     Review of Systems   Constitutional:  Negative for chills and fever.   HENT:  Positive for congestion. Negative for sore throat.    Respiratory:  Positive for cough. Negative for shortness of breath and wheezing.    Gastrointestinal:  Negative for nausea and vomiting.   Genitourinary: Negative.    Musculoskeletal: Negative.    Skin: Negative.    Neurological:  Positive for headaches.     Current Medications       Current Outpatient Medications:     benzonatate (TESSALON) 200 MG capsule, Take 1 capsule (200 mg total) by mouth 3 (three) times a day as needed for cough, Disp: 20 capsule, Rfl: 0    methylPREDNISolone 4 MG tablet therapy pack, Use as directed on package, Disp: 21 tablet, Rfl: 0    norethindrone-ethinyl estradiol (JUNEL FE 1/20) 1-20 MG-MCG per tablet,  Refill Approved    Rx renewed per Medication Renewal Policy. Medication was last renewed on 12/23/20.    Nicola Woods, [facility] Triage/Med Refill 5/6/2021     Requested Prescriptions   Pending Prescriptions Disp Refills     LANTUS SOLOSTAR U-100 INSULIN 100 unit/mL (3 mL) pen [Pharmacy Med Name: LANTUS SOLOSTAR 100 UNIT/ML] 15 mL 2     Sig: INJECT 40 UNITS UNDER THE SKIN AT BEDTIME.       Insulin/GLP-1 Refill Protocol Passed - 5/6/2021 12:37 AM        Passed - Visit with PCP or prescribing provider visit in last 6 months     Last office visit with prescriber/PCP: 3/23/2021 OR same dept: 3/23/2021 James Aguilar MD OR same specialty: 3/23/2021 James Aguilar MD Last physical: Visit date not found Last MTM visit: Visit date not found     Next appt within 3 mo: Visit date not found  Next physical within 3 mo: Visit date not found  Prescriber OR PCP: James Aguilar MD  Last diagnosis associated with med order: 1. Type 2 diabetes mellitus with hyperglycemia, without long-term current use of insulin (H)  - LANTUS SOLOSTAR U-100 INSULIN 100 unit/mL (3 mL) pen [Pharmacy Med Name: LANTUS SOLOSTAR 100 UNIT/ML]; Inject 40 Units under the skin at bedtime.  Dispense: 15 mL; Refill: 2    If protocol passes may refill for 6 months if within 3 months of last provider visit (or a total of 9 months).              Passed - A1C in last 6 months     Hemoglobin A1c   Date Value Ref Range Status   03/23/2021 8.4 (H) <=5.6 % Final               Passed - Microalbumin in last year     Microalbumin, Random Urine   Date Value Ref Range Status   12/23/2020 72.68 (H) 0.00 - 1.99 mg/dL Final                  Passed - Blood pressure in last year     BP Readings from Last 1 Encounters:   03/23/21 152/86             Passed - Creatinine done in last year     Creatinine   Date Value Ref Range Status   03/23/2021 0.81 0.70 - 1.30 mg/dL Final                             "Take by mouth, Disp: , Rfl:     sertraline (ZOLOFT) 50 mg tablet, Take 1 tablet by mouth in the morning, Disp: , Rfl:     Current Allergies     Allergies as of 02/24/2025    (No Known Allergies)            The following portions of the patient's history were reviewed and updated as appropriate: allergies, current medications, past family history, past medical history, past social history, past surgical history and problem list.     History reviewed. No pertinent past medical history.    Past Surgical History:   Procedure Laterality Date    ADENOIDECTOMY      APPENDECTOMY      EYE SURGERY      TONSILLECTOMY         Family History   Problem Relation Age of Onset    Hypertension Mother     Long QT syndrome Mother     Hypertension Father     VIRGINIE disease Father        Medications have been verified.    Objective     /60 (BP Location: Right arm, Patient Position: Sitting)   Pulse 64   Temp 97.8 °F (36.6 °C)   Resp 18   Ht 5' 4\" (1.626 m)   Wt 72.4 kg (159 lb 9.8 oz)   LMP 02/03/2025 (Approximate)   SpO2 99%   BMI 27.40 kg/m²   Patient's last menstrual period was 02/03/2025 (approximate).     Physical Exam     Physical Exam  Vitals and nursing note reviewed.   Constitutional:       General: She is not in acute distress.     Appearance: Normal appearance. She is well-developed and normal weight. She is not ill-appearing, toxic-appearing or diaphoretic.   HENT:      Head: Normocephalic and atraumatic.      Right Ear: Tympanic membrane, ear canal and external ear normal. No drainage, swelling or tenderness. No middle ear effusion. There is no impacted cerumen. Tympanic membrane is not erythematous.      Left Ear: Tympanic membrane, ear canal and external ear normal. No drainage, swelling or tenderness.  No middle ear effusion. There is no impacted cerumen. Tympanic membrane is not erythematous.      Nose: Congestion and rhinorrhea present.      Mouth/Throat:      Mouth: Mucous membranes are moist. No oral " lesions.      Pharynx: Uvula midline. Posterior oropharyngeal erythema present. No pharyngeal swelling, oropharyngeal exudate or uvula swelling.      Tonsils: No tonsillar exudate or tonsillar abscesses.   Eyes:      General:         Right eye: No discharge.         Left eye: No discharge.      Conjunctiva/sclera: Conjunctivae normal.   Cardiovascular:      Rate and Rhythm: Normal rate and regular rhythm.      Pulses: Normal pulses.      Heart sounds: Normal heart sounds. No murmur heard.     No friction rub. No gallop.   Pulmonary:      Effort: Pulmonary effort is normal. No respiratory distress.      Breath sounds: Normal breath sounds. No stridor. No wheezing, rhonchi or rales.   Chest:      Chest wall: No tenderness.   Musculoskeletal:         General: Normal range of motion.      Cervical back: Normal range of motion and neck supple. No rigidity or tenderness.   Lymphadenopathy:      Cervical: No cervical adenopathy.   Skin:     General: Skin is warm and dry.      Capillary Refill: Capillary refill takes less than 2 seconds.      Coloration: Skin is not jaundiced.      Findings: No bruising or rash.   Neurological:      General: No focal deficit present.      Mental Status: She is alert. Mental status is at baseline.   Psychiatric:         Mood and Affect: Mood normal.         Behavior: Behavior normal.

## 2025-03-18 DIAGNOSIS — I10 ESSENTIAL (PRIMARY) HYPERTENSION: ICD-10-CM

## 2025-03-18 RX ORDER — AMLODIPINE BESYLATE 10 MG/1
10 TABLET ORAL DAILY
Qty: 90 TABLET | Refills: 3 | Status: SHIPPED | OUTPATIENT
Start: 2025-03-18

## 2025-03-19 DIAGNOSIS — E11.628 TYPE 2 DIABETES MELLITUS WITH OTHER SKIN COMPLICATION, WITH LONG-TERM CURRENT USE OF INSULIN (H): ICD-10-CM

## 2025-03-19 DIAGNOSIS — Z79.4 TYPE 2 DIABETES MELLITUS WITH OTHER SKIN COMPLICATION, WITH LONG-TERM CURRENT USE OF INSULIN (H): ICD-10-CM

## 2025-03-19 DIAGNOSIS — I10 ESSENTIAL HYPERTENSION: ICD-10-CM

## 2025-03-19 RX ORDER — ATORVASTATIN CALCIUM 40 MG/1
TABLET, FILM COATED ORAL
Qty: 90 TABLET | Refills: 0 | Status: SHIPPED | OUTPATIENT
Start: 2025-03-19

## 2025-03-19 RX ORDER — METOPROLOL TARTRATE 100 MG/1
TABLET ORAL
Qty: 180 TABLET | Refills: 0 | Status: SHIPPED | OUTPATIENT
Start: 2025-03-19

## 2025-05-15 DIAGNOSIS — E11.29 TYPE 2 DIABETES MELLITUS WITH DIABETIC MICROALBUMINURIA, WITHOUT LONG-TERM CURRENT USE OF INSULIN (H): ICD-10-CM

## 2025-05-15 DIAGNOSIS — R80.9 TYPE 2 DIABETES MELLITUS WITH DIABETIC MICROALBUMINURIA, WITHOUT LONG-TERM CURRENT USE OF INSULIN (H): ICD-10-CM

## 2025-05-15 RX ORDER — SEMAGLUTIDE 1.34 MG/ML
INJECTION, SOLUTION SUBCUTANEOUS
Refills: 1 | OUTPATIENT
Start: 2025-05-15

## 2025-05-15 NOTE — TELEPHONE ENCOUNTER
Clinic RN: Please investigate patient's chart or contact patient if the information cannot be found because Refill request is for 1 mg dose but looks like 2 mg dose was sent on 4/25/25. Please call patient and confirm what dose he is on.       Kylie MATUTE RN

## 2025-05-15 NOTE — TELEPHONE ENCOUNTER
Called and spoke with patient, he stated to disregard he is taking 2 mg and the pharmacy must have sent it has a mistake.     Chris Bolton RN  [facility]

## 2025-06-05 ENCOUNTER — MYC MEDICAL ADVICE (OUTPATIENT)
Dept: FAMILY MEDICINE | Facility: CLINIC | Age: 45
End: 2025-06-05

## 2025-06-15 DIAGNOSIS — I10 ESSENTIAL HYPERTENSION: ICD-10-CM

## 2025-06-15 DIAGNOSIS — Z79.4 TYPE 2 DIABETES MELLITUS WITH OTHER SKIN COMPLICATION, WITH LONG-TERM CURRENT USE OF INSULIN (H): ICD-10-CM

## 2025-06-15 DIAGNOSIS — E11.628 TYPE 2 DIABETES MELLITUS WITH OTHER SKIN COMPLICATION, WITH LONG-TERM CURRENT USE OF INSULIN (H): ICD-10-CM

## 2025-06-16 RX ORDER — ATORVASTATIN CALCIUM 40 MG/1
40 TABLET, FILM COATED ORAL DAILY
Qty: 90 TABLET | Refills: 2 | Status: SHIPPED | OUTPATIENT
Start: 2025-06-16

## 2025-06-16 RX ORDER — METOPROLOL TARTRATE 100 MG/1
100 TABLET ORAL
Qty: 180 TABLET | Refills: 2 | Status: SHIPPED | OUTPATIENT
Start: 2025-06-16

## 2025-06-29 DIAGNOSIS — R80.9 TYPE 2 DIABETES MELLITUS WITH DIABETIC MICROALBUMINURIA, WITHOUT LONG-TERM CURRENT USE OF INSULIN (H): ICD-10-CM

## 2025-06-29 DIAGNOSIS — E11.29 TYPE 2 DIABETES MELLITUS WITH DIABETIC MICROALBUMINURIA, WITHOUT LONG-TERM CURRENT USE OF INSULIN (H): ICD-10-CM

## 2025-06-30 RX ORDER — INSULIN GLARGINE 100 [IU]/ML
50 INJECTION, SOLUTION SUBCUTANEOUS AT BEDTIME
Qty: 60 ML | Refills: 1 | Status: SHIPPED | OUTPATIENT
Start: 2025-06-30

## 2025-08-09 ENCOUNTER — HEALTH MAINTENANCE LETTER (OUTPATIENT)
Age: 45
End: 2025-08-09